# Patient Record
Sex: MALE | Race: WHITE | Employment: OTHER | URBAN - METROPOLITAN AREA
[De-identification: names, ages, dates, MRNs, and addresses within clinical notes are randomized per-mention and may not be internally consistent; named-entity substitution may affect disease eponyms.]

---

## 2021-01-01 ENCOUNTER — APPOINTMENT (OUTPATIENT)
Dept: GENERAL RADIOLOGY | Age: 76
DRG: 871 | End: 2021-01-01
Attending: EMERGENCY MEDICINE
Payer: MEDICARE

## 2021-01-01 ENCOUNTER — APPOINTMENT (OUTPATIENT)
Dept: CT IMAGING | Age: 76
DRG: 871 | End: 2021-01-01
Attending: INTERNAL MEDICINE
Payer: MEDICARE

## 2021-01-01 ENCOUNTER — APPOINTMENT (OUTPATIENT)
Dept: CT IMAGING | Age: 76
DRG: 871 | End: 2021-01-01
Attending: FAMILY MEDICINE
Payer: MEDICARE

## 2021-01-01 ENCOUNTER — APPOINTMENT (OUTPATIENT)
Dept: VASCULAR SURGERY | Age: 76
DRG: 871 | End: 2021-01-01
Attending: INTERNAL MEDICINE
Payer: MEDICARE

## 2021-01-01 ENCOUNTER — APPOINTMENT (OUTPATIENT)
Dept: GENERAL RADIOLOGY | Age: 76
DRG: 871 | End: 2021-01-01
Attending: PHYSICIAN ASSISTANT
Payer: MEDICARE

## 2021-01-01 ENCOUNTER — APPOINTMENT (OUTPATIENT)
Dept: VASCULAR SURGERY | Age: 76
DRG: 871 | End: 2021-01-01
Attending: FAMILY MEDICINE
Payer: MEDICARE

## 2021-01-01 ENCOUNTER — APPOINTMENT (OUTPATIENT)
Dept: GENERAL RADIOLOGY | Age: 76
DRG: 871 | End: 2021-01-01
Attending: ANESTHESIOLOGY
Payer: MEDICARE

## 2021-01-01 ENCOUNTER — DOCUMENTATION ONLY (OUTPATIENT)
Dept: ONCOLOGY | Age: 76
End: 2021-01-01

## 2021-01-01 ENCOUNTER — APPOINTMENT (OUTPATIENT)
Dept: GENERAL RADIOLOGY | Age: 76
DRG: 871 | End: 2021-01-01
Payer: MEDICARE

## 2021-01-01 ENCOUNTER — HOSPITAL ENCOUNTER (INPATIENT)
Age: 76
LOS: 16 days | DRG: 871 | End: 2021-06-07
Attending: EMERGENCY MEDICINE | Admitting: HOSPITALIST
Payer: MEDICARE

## 2021-01-01 VITALS
SYSTOLIC BLOOD PRESSURE: 49 MMHG | OXYGEN SATURATION: 43 % | DIASTOLIC BLOOD PRESSURE: 20 MMHG | TEMPERATURE: 91.4 F | BODY MASS INDEX: 30.12 KG/M2 | WEIGHT: 215.17 LBS | RESPIRATION RATE: 34 BRPM | HEIGHT: 71 IN

## 2021-01-01 DIAGNOSIS — U07.1 COVID-19: ICD-10-CM

## 2021-01-01 DIAGNOSIS — A41.9 SEPSIS WITHOUT ACUTE ORGAN DYSFUNCTION, DUE TO UNSPECIFIED ORGANISM (HCC): ICD-10-CM

## 2021-01-01 DIAGNOSIS — E87.6 HYPOKALEMIA: ICD-10-CM

## 2021-01-01 DIAGNOSIS — R09.02 HYPOXIA: Primary | ICD-10-CM

## 2021-01-01 LAB
25(OH)D3 SERPL-MCNC: 32.1 NG/ML (ref 30–100)
25(OH)D3 SERPL-MCNC: 34.9 NG/ML (ref 30–100)
ABO + RH BLD: NORMAL
ALBUMIN SERPL-MCNC: 1.4 G/DL (ref 3.5–5)
ALBUMIN SERPL-MCNC: 2.2 G/DL (ref 3.5–5)
ALBUMIN SERPL-MCNC: 2.3 G/DL (ref 3.5–5)
ALBUMIN SERPL-MCNC: 2.4 G/DL (ref 3.5–5)
ALBUMIN SERPL-MCNC: 2.4 G/DL (ref 3.5–5)
ALBUMIN SERPL-MCNC: 2.6 G/DL (ref 3.5–5)
ALBUMIN SERPL-MCNC: 2.6 G/DL (ref 3.5–5)
ALBUMIN/GLOB SERPL: 0.6 {RATIO} (ref 1.1–2.2)
ALBUMIN/GLOB SERPL: 0.8 {RATIO} (ref 1.1–2.2)
ALBUMIN/GLOB SERPL: 0.9 {RATIO} (ref 1.1–2.2)
ALP SERPL-CCNC: 61 U/L (ref 45–117)
ALP SERPL-CCNC: 65 U/L (ref 45–117)
ALP SERPL-CCNC: 71 U/L (ref 45–117)
ALP SERPL-CCNC: 72 U/L (ref 45–117)
ALP SERPL-CCNC: 73 U/L (ref 45–117)
ALP SERPL-CCNC: 73 U/L (ref 45–117)
ALT SERPL-CCNC: 1345 U/L (ref 12–78)
ALT SERPL-CCNC: 136 U/L (ref 12–78)
ALT SERPL-CCNC: 34 U/L (ref 12–78)
ALT SERPL-CCNC: 39 U/L (ref 12–78)
ALT SERPL-CCNC: 39 U/L (ref 12–78)
ALT SERPL-CCNC: 43 U/L (ref 12–78)
ALT SERPL-CCNC: 46 U/L (ref 12–78)
ALT SERPL-CCNC: 55 U/L (ref 12–78)
ANION GAP SERPL CALC-SCNC: 10 MMOL/L (ref 5–15)
ANION GAP SERPL CALC-SCNC: 3 MMOL/L (ref 5–15)
ANION GAP SERPL CALC-SCNC: 30 MMOL/L (ref 5–15)
ANION GAP SERPL CALC-SCNC: 5 MMOL/L (ref 5–15)
ANION GAP SERPL CALC-SCNC: 5 MMOL/L (ref 5–15)
ANION GAP SERPL CALC-SCNC: 6 MMOL/L (ref 5–15)
ANION GAP SERPL CALC-SCNC: 7 MMOL/L (ref 5–15)
ANION GAP SERPL CALC-SCNC: 7 MMOL/L (ref 5–15)
ANION GAP SERPL CALC-SCNC: 8 MMOL/L (ref 5–15)
ANION GAP SERPL CALC-SCNC: 9 MMOL/L (ref 5–15)
APTT PPP: 76.3 SEC (ref 22.1–31)
ARTERIAL PATENCY WRIST A: ABNORMAL
ARTERIAL PATENCY WRIST A: NEGATIVE
AST SERPL-CCNC: 30 U/L (ref 15–37)
AST SERPL-CCNC: 32 U/L (ref 15–37)
AST SERPL-CCNC: 38 U/L (ref 15–37)
AST SERPL-CCNC: 39 U/L (ref 15–37)
AST SERPL-CCNC: 39 U/L (ref 15–37)
AST SERPL-CCNC: 41 U/L (ref 15–37)
AST SERPL-CCNC: 41 U/L (ref 15–37)
AST SERPL-CCNC: 935 U/L (ref 15–37)
BACTERIA SPEC CULT: NORMAL
BASE DEFICIT BLD-SCNC: 12.3 MMOL/L
BASE DEFICIT BLD-SCNC: 14 MMOL/L
BASE DEFICIT BLD-SCNC: 26.1 MMOL/L
BASE EXCESS BLD CALC-SCNC: 6.5 MMOL/L
BASOPHILS # BLD: 0 K/UL (ref 0–0.1)
BASOPHILS NFR BLD: 0 % (ref 0–1)
BDY SITE: ABNORMAL
BILIRUB SERPL-MCNC: 0.5 MG/DL (ref 0.2–1)
BILIRUB SERPL-MCNC: 0.6 MG/DL (ref 0.2–1)
BILIRUB SERPL-MCNC: 0.7 MG/DL (ref 0.2–1)
BILIRUB SERPL-MCNC: 1.5 MG/DL (ref 0.2–1)
BLD PROD TYP BPU: NORMAL
BLD PROD TYP BPU: NORMAL
BLOOD GROUP ANTIBODIES SERPL: NORMAL
BNP SERPL-MCNC: 1136 PG/ML
BNP SERPL-MCNC: 263 PG/ML
BNP SERPL-MCNC: 323 PG/ML
BNP SERPL-MCNC: 348 PG/ML
BNP SERPL-MCNC: 366 PG/ML
BNP SERPL-MCNC: 439 PG/ML
BNP SERPL-MCNC: 446 PG/ML
BNP SERPL-MCNC: 538 PG/ML
BNP SERPL-MCNC: 569 PG/ML
BNP SERPL-MCNC: 574 PG/ML
BNP SERPL-MCNC: 585 PG/ML
BNP SERPL-MCNC: 619 PG/ML
BNP SERPL-MCNC: 688 PG/ML
BNP SERPL-MCNC: 821 PG/ML
BPU ID: NORMAL
BPU ID: NORMAL
BUN SERPL-MCNC: 25 MG/DL (ref 6–20)
BUN SERPL-MCNC: 27 MG/DL (ref 6–20)
BUN SERPL-MCNC: 29 MG/DL (ref 6–20)
BUN SERPL-MCNC: 29 MG/DL (ref 6–20)
BUN SERPL-MCNC: 31 MG/DL (ref 6–20)
BUN SERPL-MCNC: 32 MG/DL (ref 6–20)
BUN SERPL-MCNC: 35 MG/DL (ref 6–20)
BUN SERPL-MCNC: 36 MG/DL (ref 6–20)
BUN SERPL-MCNC: 54 MG/DL (ref 6–20)
BUN SERPL-MCNC: 70 MG/DL (ref 6–20)
BUN/CREAT SERPL: 24 (ref 12–20)
BUN/CREAT SERPL: 26 (ref 12–20)
BUN/CREAT SERPL: 32 (ref 12–20)
BUN/CREAT SERPL: 33 (ref 12–20)
BUN/CREAT SERPL: 34 (ref 12–20)
BUN/CREAT SERPL: 35 (ref 12–20)
BUN/CREAT SERPL: 36 (ref 12–20)
BUN/CREAT SERPL: 36 (ref 12–20)
BUN/CREAT SERPL: 38 (ref 12–20)
BUN/CREAT SERPL: 39 (ref 12–20)
BUN/CREAT SERPL: 41 (ref 12–20)
BUN/CREAT SERPL: 46 (ref 12–20)
CA-I BLD-SCNC: 0.86 MMOL/L (ref 1.12–1.32)
CA-I BLD-SCNC: 1.14 MMOL/L (ref 1.12–1.32)
CA-I BLD-SCNC: 1.21 MMOL/L (ref 1.12–1.32)
CALCIUM SERPL-MCNC: 6.8 MG/DL (ref 8.5–10.1)
CALCIUM SERPL-MCNC: 7.7 MG/DL (ref 8.5–10.1)
CALCIUM SERPL-MCNC: 7.9 MG/DL (ref 8.5–10.1)
CALCIUM SERPL-MCNC: 8.1 MG/DL (ref 8.5–10.1)
CALCIUM SERPL-MCNC: 8.1 MG/DL (ref 8.5–10.1)
CALCIUM SERPL-MCNC: 8.2 MG/DL (ref 8.5–10.1)
CALCIUM SERPL-MCNC: 8.5 MG/DL (ref 8.5–10.1)
CALCIUM SERPL-MCNC: 8.6 MG/DL (ref 8.5–10.1)
CHLORIDE SERPL-SCNC: 100 MMOL/L (ref 97–108)
CHLORIDE SERPL-SCNC: 102 MMOL/L (ref 97–108)
CHLORIDE SERPL-SCNC: 103 MMOL/L (ref 97–108)
CHLORIDE SERPL-SCNC: 104 MMOL/L (ref 97–108)
CHLORIDE SERPL-SCNC: 107 MMOL/L (ref 97–108)
CHLORIDE SERPL-SCNC: 107 MMOL/L (ref 97–108)
CHLORIDE SERPL-SCNC: 108 MMOL/L (ref 97–108)
CHLORIDE SERPL-SCNC: 108 MMOL/L (ref 97–108)
CHLORIDE SERPL-SCNC: 109 MMOL/L (ref 97–108)
CHLORIDE SERPL-SCNC: 98 MMOL/L (ref 97–108)
CO2 SERPL-SCNC: 22 MMOL/L (ref 21–32)
CO2 SERPL-SCNC: 24 MMOL/L (ref 21–32)
CO2 SERPL-SCNC: 25 MMOL/L (ref 21–32)
CO2 SERPL-SCNC: 25 MMOL/L (ref 21–32)
CO2 SERPL-SCNC: 27 MMOL/L (ref 21–32)
CO2 SERPL-SCNC: 29 MMOL/L (ref 21–32)
CO2 SERPL-SCNC: 29 MMOL/L (ref 21–32)
CO2 SERPL-SCNC: 30 MMOL/L (ref 21–32)
CO2 SERPL-SCNC: 35 MMOL/L (ref 21–32)
CO2 SERPL-SCNC: 8 MMOL/L (ref 21–32)
COMMENT, HOLDF: NORMAL
COVID-19 RAPID TEST, COVR: DETECTED
CREAT SERPL-MCNC: 0.76 MG/DL (ref 0.7–1.3)
CREAT SERPL-MCNC: 0.8 MG/DL (ref 0.7–1.3)
CREAT SERPL-MCNC: 0.87 MG/DL (ref 0.7–1.3)
CREAT SERPL-MCNC: 0.88 MG/DL (ref 0.7–1.3)
CREAT SERPL-MCNC: 0.89 MG/DL (ref 0.7–1.3)
CREAT SERPL-MCNC: 0.9 MG/DL (ref 0.7–1.3)
CREAT SERPL-MCNC: 0.9 MG/DL (ref 0.7–1.3)
CREAT SERPL-MCNC: 0.94 MG/DL (ref 0.7–1.3)
CREAT SERPL-MCNC: 0.97 MG/DL (ref 0.7–1.3)
CREAT SERPL-MCNC: 1.13 MG/DL (ref 0.7–1.3)
CREAT SERPL-MCNC: 1.18 MG/DL (ref 0.7–1.3)
CREAT SERPL-MCNC: 2.22 MG/DL (ref 0.7–1.3)
CRP SERPL-MCNC: 0.88 MG/DL (ref 0–0.6)
CRP SERPL-MCNC: 1.66 MG/DL (ref 0–0.6)
CRP SERPL-MCNC: 17.7 MG/DL (ref 0–0.6)
CRP SERPL-MCNC: 2.85 MG/DL (ref 0–0.6)
CRP SERPL-MCNC: 5.05 MG/DL (ref 0–0.6)
CRP SERPL-MCNC: <0.29 MG/DL (ref 0–0.6)
D DIMER PPP FEU-MCNC: 1.26 MG/L FEU (ref 0–0.65)
D DIMER PPP FEU-MCNC: 1.46 MG/L FEU (ref 0–0.65)
D DIMER PPP FEU-MCNC: 1.57 MG/L FEU (ref 0–0.65)
D DIMER PPP FEU-MCNC: 14.11 MG/L FEU (ref 0–0.65)
D DIMER PPP FEU-MCNC: 14.32 MG/L FEU (ref 0–0.65)
D DIMER PPP FEU-MCNC: 14.91 MG/L FEU (ref 0–0.65)
D DIMER PPP FEU-MCNC: 17.31 MG/L FEU (ref 0–0.65)
D DIMER PPP FEU-MCNC: 2.04 MG/L FEU (ref 0–0.65)
D DIMER PPP FEU-MCNC: 2.22 MG/L FEU (ref 0–0.65)
D DIMER PPP FEU-MCNC: 3.66 MG/L FEU (ref 0–0.65)
D DIMER PPP FEU-MCNC: 3.83 MG/L FEU (ref 0–0.65)
D DIMER PPP FEU-MCNC: 3.86 MG/L FEU (ref 0–0.65)
D DIMER PPP FEU-MCNC: 4.95 MG/L FEU (ref 0–0.65)
D DIMER PPP FEU-MCNC: 5.68 MG/L FEU (ref 0–0.65)
D DIMER PPP FEU-MCNC: 7 MG/L FEU (ref 0–0.65)
DIFFERENTIAL METHOD BLD: ABNORMAL
EOSINOPHIL # BLD: 0 K/UL (ref 0–0.4)
EOSINOPHIL # BLD: 0.7 K/UL (ref 0–0.4)
EOSINOPHIL NFR BLD: 0 % (ref 0–7)
EOSINOPHIL NFR BLD: 1 % (ref 0–7)
ERYTHROCYTE [DISTWIDTH] IN BLOOD BY AUTOMATED COUNT: 15.7 % (ref 11.5–14.5)
ERYTHROCYTE [DISTWIDTH] IN BLOOD BY AUTOMATED COUNT: 16.1 % (ref 11.5–14.5)
ERYTHROCYTE [DISTWIDTH] IN BLOOD BY AUTOMATED COUNT: 16.2 % (ref 11.5–14.5)
ERYTHROCYTE [DISTWIDTH] IN BLOOD BY AUTOMATED COUNT: 16.3 % (ref 11.5–14.5)
ERYTHROCYTE [DISTWIDTH] IN BLOOD BY AUTOMATED COUNT: 17.2 % (ref 11.5–14.5)
ERYTHROCYTE [DISTWIDTH] IN BLOOD BY AUTOMATED COUNT: 18.6 % (ref 11.5–14.5)
ERYTHROCYTE [DISTWIDTH] IN BLOOD BY AUTOMATED COUNT: 19.2 % (ref 11.5–14.5)
EST. AVERAGE GLUCOSE BLD GHB EST-MCNC: 131 MG/DL
FERRITIN SERPL-MCNC: 1769 NG/ML (ref 26–388)
FIBRINOGEN PPP-MCNC: 66 MG/DL (ref 200–475)
FIBRINOGEN PPP-MCNC: 712 MG/DL (ref 200–475)
GAS FLOW.O2 O2 DELIVERY SYS: ABNORMAL L/MIN
GAS FLOW.O2 SETTING OXYMISER: 22 BPM
GAS FLOW.O2 SETTING OXYMISER: 26 BPM
GLOBULIN SER CALC-MCNC: 1.7 G/DL (ref 2–4)
GLOBULIN SER CALC-MCNC: 2.8 G/DL (ref 2–4)
GLOBULIN SER CALC-MCNC: 3.6 G/DL (ref 2–4)
GLOBULIN SER CALC-MCNC: 3.7 G/DL (ref 2–4)
GLOBULIN SER CALC-MCNC: 3.9 G/DL (ref 2–4)
GLOBULIN SER CALC-MCNC: 4.7 G/DL (ref 2–4)
GLUCOSE BLD STRIP.AUTO-MCNC: 102 MG/DL (ref 65–117)
GLUCOSE BLD STRIP.AUTO-MCNC: 111 MG/DL (ref 65–117)
GLUCOSE BLD STRIP.AUTO-MCNC: 147 MG/DL (ref 65–117)
GLUCOSE BLD STRIP.AUTO-MCNC: 151 MG/DL (ref 65–117)
GLUCOSE BLD STRIP.AUTO-MCNC: 161 MG/DL (ref 65–117)
GLUCOSE BLD STRIP.AUTO-MCNC: 162 MG/DL (ref 65–117)
GLUCOSE BLD STRIP.AUTO-MCNC: 165 MG/DL (ref 65–117)
GLUCOSE BLD STRIP.AUTO-MCNC: 172 MG/DL (ref 65–117)
GLUCOSE BLD STRIP.AUTO-MCNC: 173 MG/DL (ref 65–117)
GLUCOSE BLD STRIP.AUTO-MCNC: 176 MG/DL (ref 65–117)
GLUCOSE BLD STRIP.AUTO-MCNC: 183 MG/DL (ref 65–117)
GLUCOSE BLD STRIP.AUTO-MCNC: 184 MG/DL (ref 65–117)
GLUCOSE BLD STRIP.AUTO-MCNC: 188 MG/DL (ref 65–117)
GLUCOSE BLD STRIP.AUTO-MCNC: 202 MG/DL (ref 65–117)
GLUCOSE BLD STRIP.AUTO-MCNC: 217 MG/DL (ref 65–117)
GLUCOSE BLD STRIP.AUTO-MCNC: 225 MG/DL (ref 65–117)
GLUCOSE BLD STRIP.AUTO-MCNC: 229 MG/DL (ref 65–117)
GLUCOSE BLD STRIP.AUTO-MCNC: 231 MG/DL (ref 65–117)
GLUCOSE BLD STRIP.AUTO-MCNC: 234 MG/DL (ref 65–117)
GLUCOSE BLD STRIP.AUTO-MCNC: 246 MG/DL (ref 65–117)
GLUCOSE BLD STRIP.AUTO-MCNC: 250 MG/DL (ref 65–117)
GLUCOSE BLD STRIP.AUTO-MCNC: 253 MG/DL (ref 65–117)
GLUCOSE BLD STRIP.AUTO-MCNC: 255 MG/DL (ref 65–117)
GLUCOSE BLD STRIP.AUTO-MCNC: 265 MG/DL (ref 65–117)
GLUCOSE BLD STRIP.AUTO-MCNC: 280 MG/DL (ref 65–117)
GLUCOSE BLD STRIP.AUTO-MCNC: 285 MG/DL (ref 65–117)
GLUCOSE BLD STRIP.AUTO-MCNC: 304 MG/DL (ref 65–117)
GLUCOSE BLD STRIP.AUTO-MCNC: 340 MG/DL (ref 65–117)
GLUCOSE SERPL-MCNC: 101 MG/DL (ref 65–100)
GLUCOSE SERPL-MCNC: 120 MG/DL (ref 65–100)
GLUCOSE SERPL-MCNC: 124 MG/DL (ref 65–100)
GLUCOSE SERPL-MCNC: 140 MG/DL (ref 65–100)
GLUCOSE SERPL-MCNC: 143 MG/DL (ref 65–100)
GLUCOSE SERPL-MCNC: 153 MG/DL (ref 65–100)
GLUCOSE SERPL-MCNC: 155 MG/DL (ref 65–100)
GLUCOSE SERPL-MCNC: 171 MG/DL (ref 65–100)
GLUCOSE SERPL-MCNC: 177 MG/DL (ref 65–100)
GLUCOSE SERPL-MCNC: 204 MG/DL (ref 65–100)
GLUCOSE SERPL-MCNC: 252 MG/DL (ref 65–100)
GLUCOSE SERPL-MCNC: 253 MG/DL (ref 65–100)
HBA1C MFR BLD: 6.2 % (ref 4–5.6)
HCO3 BLD-SCNC: 16 MMOL/L (ref 22–26)
HCO3 BLD-SCNC: 18.7 MMOL/L (ref 22–26)
HCO3 BLD-SCNC: 32.7 MMOL/L (ref 22–26)
HCO3 BLD-SCNC: 7.7 MMOL/L (ref 22–26)
HCT VFR BLD AUTO: 25.6 % (ref 36.6–50.3)
HCT VFR BLD AUTO: 33.4 % (ref 36.6–50.3)
HCT VFR BLD AUTO: 35 % (ref 36.6–50.3)
HCT VFR BLD AUTO: 35.2 % (ref 36.6–50.3)
HCT VFR BLD AUTO: 35.4 % (ref 36.6–50.3)
HCT VFR BLD AUTO: 35.7 % (ref 36.6–50.3)
HCT VFR BLD AUTO: 35.7 % (ref 36.6–50.3)
HCT VFR BLD AUTO: 35.8 % (ref 36.6–50.3)
HCT VFR BLD AUTO: 36.1 % (ref 36.6–50.3)
HCT VFR BLD AUTO: 37.2 % (ref 36.6–50.3)
HCT VFR BLD AUTO: 37.5 % (ref 36.6–50.3)
HCT VFR BLD AUTO: 38.7 % (ref 36.6–50.3)
HCT VFR BLD AUTO: 39.8 % (ref 36.6–50.3)
HCT VFR BLD AUTO: 40 % (ref 36.6–50.3)
HCT VFR BLD AUTO: 40.3 % (ref 36.6–50.3)
HCT VFR BLD AUTO: 40.7 % (ref 36.6–50.3)
HCT VFR BLD AUTO: 44.3 % (ref 36.6–50.3)
HGB BLD-MCNC: 10.9 G/DL (ref 12.1–17)
HGB BLD-MCNC: 11.5 G/DL (ref 12.1–17)
HGB BLD-MCNC: 11.7 G/DL (ref 12.1–17)
HGB BLD-MCNC: 11.7 G/DL (ref 12.1–17)
HGB BLD-MCNC: 11.8 G/DL (ref 12.1–17)
HGB BLD-MCNC: 11.9 G/DL (ref 12.1–17)
HGB BLD-MCNC: 12.1 G/DL (ref 12.1–17)
HGB BLD-MCNC: 12.2 G/DL (ref 12.1–17)
HGB BLD-MCNC: 12.7 G/DL (ref 12.1–17)
HGB BLD-MCNC: 12.9 G/DL (ref 12.1–17)
HGB BLD-MCNC: 13.1 G/DL (ref 12.1–17)
HGB BLD-MCNC: 13.1 G/DL (ref 12.1–17)
HGB BLD-MCNC: 13.5 G/DL (ref 12.1–17)
HGB BLD-MCNC: 14.1 G/DL (ref 12.1–17)
HGB BLD-MCNC: 7.4 G/DL (ref 12.1–17)
HISTORY CHECKED?,CKHIST: NORMAL
IGG SERPL-MCNC: 1230 MG/DL (ref 603–1613)
IGG1 SER-MCNC: 634 MG/DL (ref 248–810)
IGG2 SER-MCNC: 339 MG/DL (ref 130–555)
IGG3 SER-MCNC: 184 MG/DL (ref 15–102)
IGG4 SER-MCNC: 22 MG/DL (ref 2–96)
IL6 SERPL-MCNC: 1021 PG/ML (ref 0–13)
IMM GRANULOCYTES # BLD AUTO: 0 K/UL
IMM GRANULOCYTES # BLD AUTO: 0 K/UL
IMM GRANULOCYTES # BLD AUTO: 0.1 K/UL (ref 0–0.04)
IMM GRANULOCYTES # BLD AUTO: 0.2 K/UL (ref 0–0.04)
IMM GRANULOCYTES NFR BLD AUTO: 0 %
IMM GRANULOCYTES NFR BLD AUTO: 0 %
IMM GRANULOCYTES NFR BLD AUTO: 1 % (ref 0–0.5)
IMM GRANULOCYTES NFR BLD AUTO: 1 % (ref 0–0.5)
INR PPP: 1.1 (ref 0.9–1.1)
INR PPP: 1.8 (ref 0.9–1.1)
LACTATE SERPL-SCNC: 1.4 MMOL/L (ref 0.4–2)
LACTATE SERPL-SCNC: 1.7 MMOL/L (ref 0.4–2)
LACTATE SERPL-SCNC: 1.8 MMOL/L (ref 0.4–2)
LACTATE SERPL-SCNC: 2.3 MMOL/L (ref 0.4–2)
LACTATE SERPL-SCNC: 2.4 MMOL/L (ref 0.4–2)
LACTATE SERPL-SCNC: 2.4 MMOL/L (ref 0.4–2)
LACTATE SERPL-SCNC: 2.6 MMOL/L (ref 0.4–2)
LACTATE SERPL-SCNC: 2.8 MMOL/L (ref 0.4–2)
LACTATE SERPL-SCNC: 2.9 MMOL/L (ref 0.4–2)
LACTATE SERPL-SCNC: 22.9 MMOL/L (ref 0.4–2)
LDH SERPL L TO P-CCNC: 513 U/L (ref 85–241)
LYMPHOCYTES # BLD: 0.5 K/UL (ref 0.8–3.5)
LYMPHOCYTES # BLD: 0.5 K/UL (ref 0.8–3.5)
LYMPHOCYTES # BLD: 0.7 K/UL (ref 0.8–3.5)
LYMPHOCYTES # BLD: 0.8 K/UL (ref 0.8–3.5)
LYMPHOCYTES NFR BLD: 1 % (ref 12–49)
LYMPHOCYTES NFR BLD: 2 % (ref 12–49)
LYMPHOCYTES NFR BLD: 3 % (ref 12–49)
LYMPHOCYTES NFR BLD: 5 % (ref 12–49)
MAGNESIUM SERPL-MCNC: 2.1 MG/DL (ref 1.6–2.4)
MAGNESIUM SERPL-MCNC: 2.2 MG/DL (ref 1.6–2.4)
MAGNESIUM SERPL-MCNC: 2.2 MG/DL (ref 1.6–2.4)
MAGNESIUM SERPL-MCNC: 2.3 MG/DL (ref 1.6–2.4)
MAGNESIUM SERPL-MCNC: 2.3 MG/DL (ref 1.6–2.4)
MAGNESIUM SERPL-MCNC: 3 MG/DL (ref 1.6–2.4)
MCH RBC QN AUTO: 30.5 PG (ref 26–34)
MCH RBC QN AUTO: 30.5 PG (ref 26–34)
MCH RBC QN AUTO: 30.6 PG (ref 26–34)
MCH RBC QN AUTO: 30.7 PG (ref 26–34)
MCH RBC QN AUTO: 30.8 PG (ref 26–34)
MCH RBC QN AUTO: 31.3 PG (ref 26–34)
MCH RBC QN AUTO: 31.6 PG (ref 26–34)
MCHC RBC AUTO-ENTMCNC: 28.9 G/DL (ref 30–36.5)
MCHC RBC AUTO-ENTMCNC: 31.8 G/DL (ref 30–36.5)
MCHC RBC AUTO-ENTMCNC: 32.2 G/DL (ref 30–36.5)
MCHC RBC AUTO-ENTMCNC: 32.4 G/DL (ref 30–36.5)
MCHC RBC AUTO-ENTMCNC: 32.8 G/DL (ref 30–36.5)
MCHC RBC AUTO-ENTMCNC: 33.3 G/DL (ref 30–36.5)
MCHC RBC AUTO-ENTMCNC: 33.5 G/DL (ref 30–36.5)
MCV RBC AUTO: 109.4 FL (ref 80–99)
MCV RBC AUTO: 91 FL (ref 80–99)
MCV RBC AUTO: 92.2 FL (ref 80–99)
MCV RBC AUTO: 93.9 FL (ref 80–99)
MCV RBC AUTO: 95.1 FL (ref 80–99)
MCV RBC AUTO: 95.7 FL (ref 80–99)
MCV RBC AUTO: 96.6 FL (ref 80–99)
METAMYELOCYTES NFR BLD MANUAL: 6 %
MONOCYTES # BLD: 1.6 K/UL (ref 0–1)
MONOCYTES # BLD: 1.6 K/UL (ref 0–1)
MONOCYTES # BLD: 1.7 K/UL (ref 0–1)
MONOCYTES # BLD: 3.5 K/UL (ref 0–1)
MONOCYTES NFR BLD: 10 % (ref 5–13)
MONOCYTES NFR BLD: 11 % (ref 5–13)
MONOCYTES NFR BLD: 5 % (ref 5–13)
MONOCYTES NFR BLD: 7 % (ref 5–13)
MYELOCYTES NFR BLD MANUAL: 2 %
NEUTS BAND NFR BLD MANUAL: 10 % (ref 0–6)
NEUTS SEG # BLD: 12.5 K/UL (ref 1.8–8)
NEUTS SEG # BLD: 13.2 K/UL (ref 1.8–8)
NEUTS SEG # BLD: 21.5 K/UL (ref 1.8–8)
NEUTS SEG # BLD: 58.9 K/UL (ref 1.8–8)
NEUTS SEG NFR BLD: 75 % (ref 32–75)
NEUTS SEG NFR BLD: 83 % (ref 32–75)
NEUTS SEG NFR BLD: 86 % (ref 32–75)
NEUTS SEG NFR BLD: 91 % (ref 32–75)
NRBC # BLD: 0 K/UL (ref 0–0.01)
NRBC # BLD: 0.02 K/UL (ref 0–0.01)
NRBC # BLD: 0.09 K/UL (ref 0–0.01)
NRBC # BLD: 0.2 K/UL (ref 0–0.01)
NRBC # BLD: 1.15 K/UL (ref 0–0.01)
NRBC BLD-RTO: 0 PER 100 WBC
NRBC BLD-RTO: 0.1 PER 100 WBC
NRBC BLD-RTO: 0.3 PER 100 WBC
NRBC BLD-RTO: 0.5 PER 100 WBC
NRBC BLD-RTO: 1.7 PER 100 WBC
O2/TOTAL GAS SETTING VFR VENT: 100 %
O2/TOTAL GAS SETTING VFR VENT: 100 %
O2/TOTAL GAS SETTING VFR VENT: 70 %
PAW @ MEAN EXP FLOW ON VENT: 19 CMH2O
PAW @ MEAN EXP FLOW ON VENT: 22 CMH2O
PCO2 BLD: 52.3 MMHG (ref 35–45)
PCO2 BLD: 55.9 MMHG (ref 35–45)
PCO2 BLD: 63.8 MMHG (ref 35–45)
PCO2 BLD: 82.3 MMHG (ref 35–45)
PEEP RESPIRATORY: 12 CMH2O
PEEP RESPIRATORY: 12 CMH2O
PH BLD: 6.75 [PH] (ref 7.35–7.45)
PH BLD: 6.96 [PH] (ref 7.35–7.45)
PH BLD: 7.01 [PH] (ref 7.35–7.45)
PH BLD: 7.4 [PH] (ref 7.35–7.45)
PHOSPHATE SERPL-MCNC: 12.7 MG/DL (ref 2.6–4.7)
PLATELET # BLD AUTO: 153 K/UL (ref 150–400)
PLATELET # BLD AUTO: 195 K/UL (ref 150–400)
PLATELET # BLD AUTO: 334 K/UL (ref 150–400)
PLATELET # BLD AUTO: 362 K/UL (ref 150–400)
PLATELET # BLD AUTO: 372 K/UL (ref 150–400)
PLATELET # BLD AUTO: 380 K/UL (ref 150–400)
PLATELET # BLD AUTO: 385 K/UL (ref 150–400)
PMV BLD AUTO: 11.1 FL (ref 8.9–12.9)
PMV BLD AUTO: 11.5 FL (ref 8.9–12.9)
PMV BLD AUTO: 11.7 FL (ref 8.9–12.9)
PMV BLD AUTO: 11.8 FL (ref 8.9–12.9)
PMV BLD AUTO: 12 FL (ref 8.9–12.9)
PMV BLD AUTO: 12.5 FL (ref 8.9–12.9)
PMV BLD AUTO: 12.8 FL (ref 8.9–12.9)
PO2 BLD: 242 MMHG (ref 80–100)
PO2 BLD: 36 MMHG (ref 80–100)
PO2 BLD: 52 MMHG (ref 80–100)
PO2 BLD: 75 MMHG (ref 80–100)
POTASSIUM SERPL-SCNC: 3 MMOL/L (ref 3.5–5.1)
POTASSIUM SERPL-SCNC: 3.5 MMOL/L (ref 3.5–5.1)
POTASSIUM SERPL-SCNC: 3.7 MMOL/L (ref 3.5–5.1)
POTASSIUM SERPL-SCNC: 3.8 MMOL/L (ref 3.5–5.1)
POTASSIUM SERPL-SCNC: 3.8 MMOL/L (ref 3.5–5.1)
POTASSIUM SERPL-SCNC: 3.9 MMOL/L (ref 3.5–5.1)
POTASSIUM SERPL-SCNC: 4 MMOL/L (ref 3.5–5.1)
POTASSIUM SERPL-SCNC: 4.2 MMOL/L (ref 3.5–5.1)
POTASSIUM SERPL-SCNC: 4.3 MMOL/L (ref 3.5–5.1)
POTASSIUM SERPL-SCNC: 4.5 MMOL/L (ref 3.5–5.1)
POTASSIUM SERPL-SCNC: 4.6 MMOL/L (ref 3.5–5.1)
POTASSIUM SERPL-SCNC: 4.8 MMOL/L (ref 3.5–5.1)
PROCALCITONIN SERPL-MCNC: 0.1 NG/ML
PROCALCITONIN SERPL-MCNC: 0.11 NG/ML
PROCALCITONIN SERPL-MCNC: 0.14 NG/ML
PROT SERPL-MCNC: 3.1 G/DL (ref 6.4–8.2)
PROT SERPL-MCNC: 5.4 G/DL (ref 6.4–8.2)
PROT SERPL-MCNC: 5.8 G/DL (ref 6.4–8.2)
PROT SERPL-MCNC: 5.9 G/DL (ref 6.4–8.2)
PROT SERPL-MCNC: 5.9 G/DL (ref 6.4–8.2)
PROT SERPL-MCNC: 6 G/DL (ref 6.4–8.2)
PROT SERPL-MCNC: 6.3 G/DL (ref 6.4–8.2)
PROT SERPL-MCNC: 7.3 G/DL (ref 6.4–8.2)
PROTHROMBIN TIME: 11.6 SEC (ref 9–11.1)
PROTHROMBIN TIME: 17.9 SEC (ref 9–11.1)
RBC # BLD AUTO: 2.34 M/UL (ref 4.1–5.7)
RBC # BLD AUTO: 3.87 M/UL (ref 4.1–5.7)
RBC # BLD AUTO: 4.12 M/UL (ref 4.1–5.7)
RBC # BLD AUTO: 4.12 M/UL (ref 4.1–5.7)
RBC # BLD AUTO: 4.28 M/UL (ref 4.1–5.7)
RBC # BLD AUTO: 4.43 M/UL (ref 4.1–5.7)
RBC # BLD AUTO: 4.63 M/UL (ref 4.1–5.7)
RBC MORPH BLD: ABNORMAL
SAMPLES BEING HELD,HOLD: NORMAL
SAO2 % BLD: 62.9 % (ref 92–97)
SAO2 % BLD: 67.9 % (ref 92–97)
SAO2 % BLD: 85 % (ref 92–97)
SAO2 % BLD: 98.8 % (ref 92–97)
SARS-COV-2, COV2NT: DETECTED
SERVICE CMNT-IMP: ABNORMAL
SERVICE CMNT-IMP: NORMAL
SODIUM SERPL-SCNC: 136 MMOL/L (ref 136–145)
SODIUM SERPL-SCNC: 136 MMOL/L (ref 136–145)
SODIUM SERPL-SCNC: 137 MMOL/L (ref 136–145)
SODIUM SERPL-SCNC: 138 MMOL/L (ref 136–145)
SODIUM SERPL-SCNC: 139 MMOL/L (ref 136–145)
SODIUM SERPL-SCNC: 140 MMOL/L (ref 136–145)
SODIUM SERPL-SCNC: 141 MMOL/L (ref 136–145)
SODIUM SERPL-SCNC: 141 MMOL/L (ref 136–145)
SODIUM SERPL-SCNC: 143 MMOL/L (ref 136–145)
SODIUM SERPL-SCNC: 145 MMOL/L (ref 136–145)
SOURCE, COVRS: ABNORMAL
SPECIMEN EXP DATE BLD: NORMAL
SPECIMEN TYPE: ABNORMAL
STATUS OF UNIT,%ST: NORMAL
STATUS OF UNIT,%ST: NORMAL
THERAPEUTIC RANGE,PTTT: ABNORMAL SECS (ref 58–77)
UNIT DIVISION, %UDIV: 0
UNIT DIVISION, %UDIV: 0
VENTILATION MODE VENT: ABNORMAL
VENTILATION MODE VENT: ABNORMAL
VT SETTING VENT: 550 ML
VT SETTING VENT: 550 ML
WBC # BLD AUTO: 15.1 K/UL (ref 4.1–11.1)
WBC # BLD AUTO: 15.5 K/UL (ref 4.1–11.1)
WBC # BLD AUTO: 23.7 K/UL (ref 4.1–11.1)
WBC # BLD AUTO: 28.5 K/UL (ref 4.1–11.1)
WBC # BLD AUTO: 30.8 K/UL (ref 4.1–11.1)
WBC # BLD AUTO: 39.2 K/UL (ref 4.1–11.1)
WBC # BLD AUTO: 69.3 K/UL (ref 4.1–11.1)

## 2021-01-01 PROCEDURE — 85610 PROTHROMBIN TIME: CPT

## 2021-01-01 PROCEDURE — 74011250637 HC RX REV CODE- 250/637: Performed by: HOSPITALIST

## 2021-01-01 PROCEDURE — 36415 COLL VENOUS BLD VENIPUNCTURE: CPT

## 2021-01-01 PROCEDURE — 86140 C-REACTIVE PROTEIN: CPT

## 2021-01-01 PROCEDURE — 80053 COMPREHEN METABOLIC PANEL: CPT

## 2021-01-01 PROCEDURE — 74011000258 HC RX REV CODE- 258: Performed by: EMERGENCY MEDICINE

## 2021-01-01 PROCEDURE — P9045 ALBUMIN (HUMAN), 5%, 250 ML: HCPCS | Performed by: NURSE PRACTITIONER

## 2021-01-01 PROCEDURE — 82962 GLUCOSE BLOOD TEST: CPT

## 2021-01-01 PROCEDURE — 84145 PROCALCITONIN (PCT): CPT

## 2021-01-01 PROCEDURE — 74011250637 HC RX REV CODE- 250/637: Performed by: INTERNAL MEDICINE

## 2021-01-01 PROCEDURE — 94664 DEMO&/EVAL PT USE INHALER: CPT

## 2021-01-01 PROCEDURE — 74011250636 HC RX REV CODE- 250/636: Performed by: PHYSICIAN ASSISTANT

## 2021-01-01 PROCEDURE — 74011250636 HC RX REV CODE- 250/636: Performed by: NURSE PRACTITIONER

## 2021-01-01 PROCEDURE — 85379 FIBRIN DEGRADATION QUANT: CPT

## 2021-01-01 PROCEDURE — 74011636637 HC RX REV CODE- 636/637: Performed by: INTERNAL MEDICINE

## 2021-01-01 PROCEDURE — 83880 ASSAY OF NATRIURETIC PEPTIDE: CPT

## 2021-01-01 PROCEDURE — 85730 THROMBOPLASTIN TIME PARTIAL: CPT

## 2021-01-01 PROCEDURE — 36600 WITHDRAWAL OF ARTERIAL BLOOD: CPT

## 2021-01-01 PROCEDURE — 83615 LACTATE (LD) (LDH) ENZYME: CPT

## 2021-01-01 PROCEDURE — 31500 INSERT EMERGENCY AIRWAY: CPT

## 2021-01-01 PROCEDURE — 94640 AIRWAY INHALATION TREATMENT: CPT

## 2021-01-01 PROCEDURE — 84100 ASSAY OF PHOSPHORUS: CPT

## 2021-01-01 PROCEDURE — 99285 EMERGENCY DEPT VISIT HI MDM: CPT

## 2021-01-01 PROCEDURE — 74011000250 HC RX REV CODE- 250: Performed by: HOSPITALIST

## 2021-01-01 PROCEDURE — 65660000001 HC RM ICU INTERMED STEPDOWN

## 2021-01-01 PROCEDURE — 85025 COMPLETE CBC W/AUTO DIFF WBC: CPT

## 2021-01-01 PROCEDURE — 71275 CT ANGIOGRAPHY CHEST: CPT

## 2021-01-01 PROCEDURE — 94760 N-INVAS EAR/PLS OXIMETRY 1: CPT

## 2021-01-01 PROCEDURE — 74011000250 HC RX REV CODE- 250

## 2021-01-01 PROCEDURE — 36430 TRANSFUSION BLD/BLD COMPNT: CPT

## 2021-01-01 PROCEDURE — 82787 IGG 1 2 3 OR 4 EACH: CPT

## 2021-01-01 PROCEDURE — 83605 ASSAY OF LACTIC ACID: CPT

## 2021-01-01 PROCEDURE — U0003 INFECTIOUS AGENT DETECTION BY NUCLEIC ACID (DNA OR RNA); SEVERE ACUTE RESPIRATORY SYNDROME CORONAVIRUS 2 (SARS-COV-2) (CORONAVIRUS DISEASE [COVID-19]), AMPLIFIED PROBE TECHNIQUE, MAKING USE OF HIGH THROUGHPUT TECHNOLOGIES AS DESCRIBED BY CMS-2020-01-R: HCPCS

## 2021-01-01 PROCEDURE — 74011250636 HC RX REV CODE- 250/636: Performed by: ANESTHESIOLOGY

## 2021-01-01 PROCEDURE — 71045 X-RAY EXAM CHEST 1 VIEW: CPT

## 2021-01-01 PROCEDURE — C9113 INJ PANTOPRAZOLE SODIUM, VIA: HCPCS | Performed by: HOSPITALIST

## 2021-01-01 PROCEDURE — 74011250636 HC RX REV CODE- 250/636: Performed by: INTERNAL MEDICINE

## 2021-01-01 PROCEDURE — 82306 VITAMIN D 25 HYDROXY: CPT

## 2021-01-01 PROCEDURE — 86923 COMPATIBILITY TEST ELECTRIC: CPT

## 2021-01-01 PROCEDURE — 74011000250 HC RX REV CODE- 250: Performed by: NURSE PRACTITIONER

## 2021-01-01 PROCEDURE — 93970 EXTREMITY STUDY: CPT

## 2021-01-01 PROCEDURE — 82728 ASSAY OF FERRITIN: CPT

## 2021-01-01 PROCEDURE — 83735 ASSAY OF MAGNESIUM: CPT

## 2021-01-01 PROCEDURE — 74011000258 HC RX REV CODE- 258: Performed by: FAMILY MEDICINE

## 2021-01-01 PROCEDURE — 85027 COMPLETE CBC AUTOMATED: CPT

## 2021-01-01 PROCEDURE — 85018 HEMOGLOBIN: CPT

## 2021-01-01 PROCEDURE — 94660 CPAP INITIATION&MGMT: CPT

## 2021-01-01 PROCEDURE — 87077 CULTURE AEROBIC IDENTIFY: CPT

## 2021-01-01 PROCEDURE — 94762 N-INVAS EAR/PLS OXIMTRY CONT: CPT

## 2021-01-01 PROCEDURE — 85014 HEMATOCRIT: CPT

## 2021-01-01 PROCEDURE — 92950 HEART/LUNG RESUSCITATION CPR: CPT

## 2021-01-01 PROCEDURE — 87186 SC STD MICRODIL/AGAR DIL: CPT

## 2021-01-01 PROCEDURE — 80048 BASIC METABOLIC PNL TOTAL CA: CPT

## 2021-01-01 PROCEDURE — 77030041213 HC CHMBR AIRSPRL TUBE FISP -B

## 2021-01-01 PROCEDURE — 65660000000 HC RM CCU STEPDOWN

## 2021-01-01 PROCEDURE — 77010033711 HC HIGH FLOW OXYGEN

## 2021-01-01 PROCEDURE — 74011250636 HC RX REV CODE- 250/636: Performed by: HOSPITALIST

## 2021-01-01 PROCEDURE — C1769 GUIDE WIRE: HCPCS

## 2021-01-01 PROCEDURE — 87040 BLOOD CULTURE FOR BACTERIA: CPT

## 2021-01-01 PROCEDURE — 74011250636 HC RX REV CODE- 250/636: Performed by: FAMILY MEDICINE

## 2021-01-01 PROCEDURE — 36573 INSJ PICC RS&I 5 YR+: CPT | Performed by: INTERNAL MEDICINE

## 2021-01-01 PROCEDURE — 74011000258 HC RX REV CODE- 258: Performed by: ANESTHESIOLOGY

## 2021-01-01 PROCEDURE — 87635 SARS-COV-2 COVID-19 AMP PRB: CPT

## 2021-01-01 PROCEDURE — 93971 EXTREMITY STUDY: CPT

## 2021-01-01 PROCEDURE — 74011000258 HC RX REV CODE- 258: Performed by: NURSE PRACTITIONER

## 2021-01-01 PROCEDURE — P9073 PLATELETS PHERESIS PATH REDU: HCPCS

## 2021-01-01 PROCEDURE — 74011000250 HC RX REV CODE- 250: Performed by: ANESTHESIOLOGY

## 2021-01-01 PROCEDURE — 83520 IMMUNOASSAY QUANT NOS NONAB: CPT

## 2021-01-01 PROCEDURE — 77030020365 HC SOL INJ SOD CL 0.9% 50ML

## 2021-01-01 PROCEDURE — P9040 RBC LEUKOREDUCED IRRADIATED: HCPCS

## 2021-01-01 PROCEDURE — P9016 RBC LEUKOCYTES REDUCED: HCPCS

## 2021-01-01 PROCEDURE — 5A09557 ASSISTANCE WITH RESPIRATORY VENTILATION, GREATER THAN 96 CONSECUTIVE HOURS, CONTINUOUS POSITIVE AIRWAY PRESSURE: ICD-10-PCS | Performed by: HOSPITALIST

## 2021-01-01 PROCEDURE — XW033E5 INTRODUCTION OF REMDESIVIR ANTI-INFECTIVE INTO PERIPHERAL VEIN, PERCUTANEOUS APPROACH, NEW TECHNOLOGY GROUP 5: ICD-10-PCS | Performed by: HOSPITALIST

## 2021-01-01 PROCEDURE — 82803 BLOOD GASES ANY COMBINATION: CPT

## 2021-01-01 PROCEDURE — 74011000258 HC RX REV CODE- 258: Performed by: HOSPITALIST

## 2021-01-01 PROCEDURE — 74011250637 HC RX REV CODE- 250/637: Performed by: PHYSICIAN ASSISTANT

## 2021-01-01 PROCEDURE — 0BJ08ZZ INSPECTION OF TRACHEOBRONCHIAL TREE, VIA NATURAL OR ARTIFICIAL OPENING ENDOSCOPIC: ICD-10-PCS | Performed by: ANESTHESIOLOGY

## 2021-01-01 PROCEDURE — 86920 COMPATIBILITY TEST SPIN: CPT

## 2021-01-01 PROCEDURE — 86901 BLOOD TYPING SEROLOGIC RH(D): CPT

## 2021-01-01 PROCEDURE — XW033H5 INTRODUCTION OF TOCILIZUMAB INTO PERIPHERAL VEIN, PERCUTANEOUS APPROACH, NEW TECHNOLOGY GROUP 5: ICD-10-PCS | Performed by: INTERNAL MEDICINE

## 2021-01-01 PROCEDURE — 02HV33Z INSERTION OF INFUSION DEVICE INTO SUPERIOR VENA CAVA, PERCUTANEOUS APPROACH: ICD-10-PCS | Performed by: INTERNAL MEDICINE

## 2021-01-01 PROCEDURE — 77030012794 HC KT NSL CANN/HGH TRAN -A

## 2021-01-01 PROCEDURE — 02HV33Z INSERTION OF INFUSION DEVICE INTO SUPERIOR VENA CAVA, PERCUTANEOUS APPROACH: ICD-10-PCS | Performed by: HOSPITALIST

## 2021-01-01 PROCEDURE — 74011000258 HC RX REV CODE- 258: Performed by: INTERNAL MEDICINE

## 2021-01-01 PROCEDURE — 83036 HEMOGLOBIN GLYCOSYLATED A1C: CPT

## 2021-01-01 PROCEDURE — 77030012341 HC CHMB SPCR OPTC MDI VYRM -A

## 2021-01-01 PROCEDURE — 77010033678 HC OXYGEN DAILY

## 2021-01-01 PROCEDURE — 03HY32Z INSERTION OF MONITORING DEVICE INTO UPPER ARTERY, PERCUTANEOUS APPROACH: ICD-10-PCS | Performed by: ANESTHESIOLOGY

## 2021-01-01 PROCEDURE — 74011250636 HC RX REV CODE- 250/636: Performed by: EMERGENCY MEDICINE

## 2021-01-01 PROCEDURE — P9059 PLASMA, FRZ BETWEEN 8-24HOUR: HCPCS

## 2021-01-01 PROCEDURE — C1751 CATH, INF, PER/CENT/MIDLINE: HCPCS

## 2021-01-01 PROCEDURE — 94002 VENT MGMT INPAT INIT DAY: CPT

## 2021-01-01 PROCEDURE — 0BH17EZ INSERTION OF ENDOTRACHEAL AIRWAY INTO TRACHEA, VIA NATURAL OR ARTIFICIAL OPENING: ICD-10-PCS | Performed by: ANESTHESIOLOGY

## 2021-01-01 PROCEDURE — 85384 FIBRINOGEN ACTIVITY: CPT

## 2021-01-01 PROCEDURE — 74011636637 HC RX REV CODE- 636/637: Performed by: FAMILY MEDICINE

## 2021-01-01 PROCEDURE — 74011000636 HC RX REV CODE- 636: Performed by: RADIOLOGY

## 2021-01-01 PROCEDURE — 82040 ASSAY OF SERUM ALBUMIN: CPT

## 2021-01-01 PROCEDURE — 5A1935Z RESPIRATORY VENTILATION, LESS THAN 24 CONSECUTIVE HOURS: ICD-10-PCS | Performed by: ANESTHESIOLOGY

## 2021-01-01 PROCEDURE — 30233N1 TRANSFUSION OF NONAUTOLOGOUS RED BLOOD CELLS INTO PERIPHERAL VEIN, PERCUTANEOUS APPROACH: ICD-10-PCS | Performed by: FAMILY MEDICINE

## 2021-01-01 PROCEDURE — 76937 US GUIDE VASCULAR ACCESS: CPT

## 2021-01-01 RX ORDER — ENOXAPARIN SODIUM 100 MG/ML
40 INJECTION SUBCUTANEOUS EVERY 12 HOURS
Status: COMPLETED | OUTPATIENT
Start: 2021-01-01 | End: 2021-01-01

## 2021-01-01 RX ORDER — POLYETHYLENE GLYCOL 3350 17 G/17G
17 POWDER, FOR SOLUTION ORAL DAILY PRN
Status: DISCONTINUED | OUTPATIENT
Start: 2021-01-01 | End: 2021-01-01 | Stop reason: SDUPTHER

## 2021-01-01 RX ORDER — FUROSEMIDE 20 MG/1
20 TABLET ORAL DAILY
Status: DISCONTINUED | OUTPATIENT
Start: 2021-01-01 | End: 2021-01-01

## 2021-01-01 RX ORDER — INSULIN GLARGINE 100 [IU]/ML
5 INJECTION, SOLUTION SUBCUTANEOUS DAILY
Status: DISCONTINUED | OUTPATIENT
Start: 2021-01-01 | End: 2021-01-01

## 2021-01-01 RX ORDER — INSULIN LISPRO 100 [IU]/ML
5 INJECTION, SOLUTION INTRAVENOUS; SUBCUTANEOUS
Status: DISCONTINUED | OUTPATIENT
Start: 2021-01-01 | End: 2021-01-01

## 2021-01-01 RX ORDER — LISINOPRIL AND HYDROCHLOROTHIAZIDE 20; 25 MG/1; MG/1
1 TABLET ORAL DAILY
COMMUNITY

## 2021-01-01 RX ORDER — SODIUM BICARBONATE 84 MG/ML
INJECTION, SOLUTION INTRAVENOUS
Status: DISCONTINUED
Start: 2021-01-01 | End: 2021-01-01 | Stop reason: HOSPADM

## 2021-01-01 RX ORDER — ACYCLOVIR 800 MG/1
400 TABLET ORAL 2 TIMES DAILY
Status: DISCONTINUED | OUTPATIENT
Start: 2021-01-01 | End: 2021-01-01 | Stop reason: HOSPADM

## 2021-01-01 RX ORDER — ENOXAPARIN SODIUM 100 MG/ML
30 INJECTION SUBCUTANEOUS EVERY 12 HOURS
Status: DISCONTINUED | OUTPATIENT
Start: 2021-01-01 | End: 2021-01-01 | Stop reason: DRUGHIGH

## 2021-01-01 RX ORDER — NOREPINEPHRINE BITARTRATE/D5W 8 MG/250ML
.5-3 PLASTIC BAG, INJECTION (ML) INTRAVENOUS
Status: DISCONTINUED | OUTPATIENT
Start: 2021-01-01 | End: 2021-01-01 | Stop reason: HOSPADM

## 2021-01-01 RX ORDER — AMLODIPINE BESYLATE 5 MG/1
5 TABLET ORAL DAILY
Status: DISCONTINUED | OUTPATIENT
Start: 2021-01-01 | End: 2021-01-01 | Stop reason: HOSPADM

## 2021-01-01 RX ORDER — FLUOXETINE HYDROCHLORIDE 40 MG/1
40 CAPSULE ORAL DAILY
COMMUNITY

## 2021-01-01 RX ORDER — PANTOPRAZOLE SODIUM 40 MG/1
40 TABLET, DELAYED RELEASE ORAL
Status: DISCONTINUED | OUTPATIENT
Start: 2021-01-01 | End: 2021-01-01 | Stop reason: SDUPTHER

## 2021-01-01 RX ORDER — LEVOFLOXACIN 5 MG/ML
750 INJECTION, SOLUTION INTRAVENOUS EVERY 24 HOURS
Status: DISCONTINUED | OUTPATIENT
Start: 2021-01-01 | End: 2021-01-01

## 2021-01-01 RX ORDER — ACETAMINOPHEN 650 MG/1
650 SUPPOSITORY RECTAL
Status: DISCONTINUED | OUTPATIENT
Start: 2021-01-01 | End: 2021-01-01 | Stop reason: HOSPADM

## 2021-01-01 RX ORDER — PROMETHAZINE HYDROCHLORIDE 25 MG/1
12.5 TABLET ORAL
Status: DISCONTINUED | OUTPATIENT
Start: 2021-01-01 | End: 2021-01-01

## 2021-01-01 RX ORDER — ACYCLOVIR 400 MG/1
400 TABLET ORAL 2 TIMES DAILY
COMMUNITY

## 2021-01-01 RX ORDER — AMLODIPINE BESYLATE 10 MG/1
10 TABLET ORAL DAILY
COMMUNITY

## 2021-01-01 RX ORDER — SODIUM BICARBONATE IN D5W 150/1000ML
PLASTIC BAG, INJECTION (ML) INTRAVENOUS CONTINUOUS
Status: DISCONTINUED | OUTPATIENT
Start: 2021-01-01 | End: 2021-01-01 | Stop reason: HOSPADM

## 2021-01-01 RX ORDER — FLUOXETINE HYDROCHLORIDE 20 MG/1
40 CAPSULE ORAL DAILY
Status: DISCONTINUED | OUTPATIENT
Start: 2021-01-01 | End: 2021-01-01 | Stop reason: HOSPADM

## 2021-01-01 RX ORDER — FOLIC ACID 1 MG/1
1 TABLET ORAL DAILY
Status: DISCONTINUED | OUTPATIENT
Start: 2021-01-01 | End: 2021-01-01 | Stop reason: HOSPADM

## 2021-01-01 RX ORDER — LEVOFLOXACIN 5 MG/ML
750 INJECTION, SOLUTION INTRAVENOUS EVERY 24 HOURS
Status: DISCONTINUED | OUTPATIENT
Start: 2021-01-01 | End: 2021-01-01 | Stop reason: HOSPADM

## 2021-01-01 RX ORDER — SODIUM CHLORIDE, SODIUM LACTATE, POTASSIUM CHLORIDE, CALCIUM CHLORIDE 600; 310; 30; 20 MG/100ML; MG/100ML; MG/100ML; MG/100ML
1000 INJECTION, SOLUTION INTRAVENOUS ONCE
Status: COMPLETED | OUTPATIENT
Start: 2021-01-01 | End: 2021-01-01

## 2021-01-01 RX ORDER — POTASSIUM CHLORIDE 750 MG/1
20 TABLET, FILM COATED, EXTENDED RELEASE ORAL
Status: COMPLETED | OUTPATIENT
Start: 2021-01-01 | End: 2021-01-01

## 2021-01-01 RX ORDER — LANOLIN ALCOHOL/MO/W.PET/CERES
325 CREAM (GRAM) TOPICAL
COMMUNITY

## 2021-01-01 RX ORDER — CALCIUM CHLORIDE INJECTION 100 MG/ML
INJECTION, SOLUTION INTRAVENOUS
Status: COMPLETED | OUTPATIENT
Start: 2021-01-01 | End: 2021-01-01

## 2021-01-01 RX ORDER — DEXAMETHASONE SODIUM PHOSPHATE 4 MG/ML
6 INJECTION, SOLUTION INTRA-ARTICULAR; INTRALESIONAL; INTRAMUSCULAR; INTRAVENOUS; SOFT TISSUE EVERY 24 HOURS
Status: DISCONTINUED | OUTPATIENT
Start: 2021-01-01 | End: 2021-01-01

## 2021-01-01 RX ORDER — SODIUM CHLORIDE 9 MG/ML
250 INJECTION, SOLUTION INTRAVENOUS AS NEEDED
Status: DISCONTINUED | OUTPATIENT
Start: 2021-01-01 | End: 2021-01-01 | Stop reason: HOSPADM

## 2021-01-01 RX ORDER — DOXYCYCLINE 100 MG/1
100 CAPSULE ORAL 2 TIMES DAILY
COMMUNITY

## 2021-01-01 RX ORDER — LORAZEPAM 2 MG/ML
1 INJECTION INTRAMUSCULAR
Status: DISCONTINUED | OUTPATIENT
Start: 2021-01-01 | End: 2021-01-01 | Stop reason: HOSPADM

## 2021-01-01 RX ORDER — SODIUM CHLORIDE 9 MG/ML
1000 INJECTION, SOLUTION INTRAVENOUS ONCE
Status: COMPLETED | OUTPATIENT
Start: 2021-01-01 | End: 2021-01-01

## 2021-01-01 RX ORDER — ENOXAPARIN SODIUM 100 MG/ML
40 INJECTION SUBCUTANEOUS EVERY 12 HOURS
Status: DISCONTINUED | OUTPATIENT
Start: 2021-01-01 | End: 2021-01-01

## 2021-01-01 RX ORDER — SODIUM CHLORIDE 0.9 % (FLUSH) 0.9 %
5-40 SYRINGE (ML) INJECTION AS NEEDED
Status: DISCONTINUED | OUTPATIENT
Start: 2021-01-01 | End: 2021-01-01 | Stop reason: HOSPADM

## 2021-01-01 RX ORDER — SODIUM BICARBONATE 1 MEQ/ML
SYRINGE (ML) INTRAVENOUS
Status: COMPLETED | OUTPATIENT
Start: 2021-01-01 | End: 2021-01-01

## 2021-01-01 RX ORDER — EPINEPHRINE 0.1 MG/ML
INJECTION INTRACARDIAC; INTRAVENOUS
Status: COMPLETED | OUTPATIENT
Start: 2021-01-01 | End: 2021-01-01

## 2021-01-01 RX ORDER — ACETAMINOPHEN 325 MG/1
650 TABLET ORAL
Status: DISCONTINUED | OUTPATIENT
Start: 2021-01-01 | End: 2021-01-01 | Stop reason: HOSPADM

## 2021-01-01 RX ORDER — INSULIN GLARGINE 100 [IU]/ML
10 INJECTION, SOLUTION SUBCUTANEOUS
Status: DISCONTINUED | OUTPATIENT
Start: 2021-01-01 | End: 2021-01-01 | Stop reason: HOSPADM

## 2021-01-01 RX ORDER — ONDANSETRON 2 MG/ML
4 INJECTION INTRAMUSCULAR; INTRAVENOUS
Status: DISCONTINUED | OUTPATIENT
Start: 2021-01-01 | End: 2021-01-01

## 2021-01-01 RX ORDER — LORAZEPAM 2 MG/ML
1 INJECTION INTRAMUSCULAR ONCE
Status: COMPLETED | OUTPATIENT
Start: 2021-01-01 | End: 2021-01-01

## 2021-01-01 RX ORDER — FUROSEMIDE 10 MG/ML
20 INJECTION INTRAMUSCULAR; INTRAVENOUS 2 TIMES DAILY
Status: DISCONTINUED | OUTPATIENT
Start: 2021-01-01 | End: 2021-01-01

## 2021-01-01 RX ORDER — MAGNESIUM SULFATE 100 %
4 CRYSTALS MISCELLANEOUS AS NEEDED
Status: DISCONTINUED | OUTPATIENT
Start: 2021-01-01 | End: 2021-01-01 | Stop reason: HOSPADM

## 2021-01-01 RX ORDER — CEFEPIME HYDROCHLORIDE 2 G/1
2 INJECTION, POWDER, FOR SOLUTION INTRAVENOUS EVERY 12 HOURS
Status: DISCONTINUED | OUTPATIENT
Start: 2021-01-01 | End: 2021-01-01 | Stop reason: DRUGHIGH

## 2021-01-01 RX ORDER — INSULIN LISPRO 100 [IU]/ML
INJECTION, SOLUTION INTRAVENOUS; SUBCUTANEOUS EVERY 6 HOURS
Status: DISCONTINUED | OUTPATIENT
Start: 2021-01-01 | End: 2021-01-01 | Stop reason: HOSPADM

## 2021-01-01 RX ORDER — TAMSULOSIN HYDROCHLORIDE 0.4 MG/1
0.4 CAPSULE ORAL DAILY
COMMUNITY

## 2021-01-01 RX ORDER — SODIUM CHLORIDE, SODIUM LACTATE, POTASSIUM CHLORIDE, CALCIUM CHLORIDE 600; 310; 30; 20 MG/100ML; MG/100ML; MG/100ML; MG/100ML
1000 INJECTION, SOLUTION INTRAVENOUS CONTINUOUS
Status: DISCONTINUED | OUTPATIENT
Start: 2021-01-01 | End: 2021-01-01

## 2021-01-01 RX ORDER — GUAIFENESIN 100 MG/5ML
200 SOLUTION ORAL
COMMUNITY

## 2021-01-01 RX ORDER — SODIUM BICARBONATE 1 MEQ/ML
SYRINGE (ML) INTRAVENOUS
Status: COMPLETED
Start: 2021-01-01 | End: 2021-01-01

## 2021-01-01 RX ORDER — OMEPRAZOLE 40 MG/1
40 CAPSULE, DELAYED RELEASE ORAL DAILY
COMMUNITY

## 2021-01-01 RX ORDER — INSULIN LISPRO 100 [IU]/ML
INJECTION, SOLUTION INTRAVENOUS; SUBCUTANEOUS
Status: DISCONTINUED | OUTPATIENT
Start: 2021-01-01 | End: 2021-01-01

## 2021-01-01 RX ORDER — FUROSEMIDE 10 MG/ML
20 INJECTION INTRAMUSCULAR; INTRAVENOUS DAILY
Status: DISCONTINUED | OUTPATIENT
Start: 2021-01-01 | End: 2021-01-01

## 2021-01-01 RX ORDER — ACETAMINOPHEN 650 MG/1
650 SUPPOSITORY RECTAL
Status: DISCONTINUED | OUTPATIENT
Start: 2021-01-01 | End: 2021-01-01

## 2021-01-01 RX ORDER — POTASSIUM CHLORIDE 7.45 MG/ML
10 INJECTION INTRAVENOUS
Status: COMPLETED | OUTPATIENT
Start: 2021-01-01 | End: 2021-01-01

## 2021-01-01 RX ORDER — LANOLIN ALCOHOL/MO/W.PET/CERES
1 CREAM (GRAM) TOPICAL
Status: DISCONTINUED | OUTPATIENT
Start: 2021-01-01 | End: 2021-01-01 | Stop reason: HOSPADM

## 2021-01-01 RX ORDER — FUROSEMIDE 10 MG/ML
40 INJECTION INTRAMUSCULAR; INTRAVENOUS 2 TIMES DAILY
Status: DISCONTINUED | OUTPATIENT
Start: 2021-01-01 | End: 2021-01-01 | Stop reason: HOSPADM

## 2021-01-01 RX ORDER — GUAIFENESIN 100 MG/5ML
100 SOLUTION ORAL
Status: DISCONTINUED | OUTPATIENT
Start: 2021-01-01 | End: 2021-01-01 | Stop reason: HOSPADM

## 2021-01-01 RX ORDER — PREDNISONE 10 MG/1
TABLET ORAL
COMMUNITY

## 2021-01-01 RX ORDER — ALBUTEROL SULFATE 90 UG/1
2 AEROSOL, METERED RESPIRATORY (INHALATION)
Status: DISCONTINUED | OUTPATIENT
Start: 2021-01-01 | End: 2021-01-01 | Stop reason: HOSPADM

## 2021-01-01 RX ORDER — ACETAMINOPHEN 325 MG/1
650 TABLET ORAL
Status: DISCONTINUED | OUTPATIENT
Start: 2021-01-01 | End: 2021-01-01

## 2021-01-01 RX ORDER — ROCURONIUM BROMIDE 10 MG/ML
INJECTION, SOLUTION INTRAVENOUS
Status: DISCONTINUED
Start: 2021-01-01 | End: 2021-01-01 | Stop reason: HOSPADM

## 2021-01-01 RX ORDER — FOLIC ACID 1 MG/1
1 TABLET ORAL DAILY
COMMUNITY

## 2021-01-01 RX ORDER — POLYETHYLENE GLYCOL 3350 17 G/17G
17 POWDER, FOR SOLUTION ORAL DAILY PRN
Status: DISCONTINUED | OUTPATIENT
Start: 2021-01-01 | End: 2021-01-01 | Stop reason: HOSPADM

## 2021-01-01 RX ORDER — SODIUM BICARBONATE 1 MEQ/ML
50 SYRINGE (ML) INTRAVENOUS
Status: DISCONTINUED | OUTPATIENT
Start: 2021-01-01 | End: 2021-01-01 | Stop reason: HOSPADM

## 2021-01-01 RX ORDER — SODIUM BICARBONATE 84 MG/ML
100 INJECTION, SOLUTION INTRAVENOUS
Status: COMPLETED | OUTPATIENT
Start: 2021-01-01 | End: 2021-01-01

## 2021-01-01 RX ORDER — ENOXAPARIN SODIUM 100 MG/ML
100 INJECTION SUBCUTANEOUS EVERY 12 HOURS
Status: DISCONTINUED | OUTPATIENT
Start: 2021-01-01 | End: 2021-01-01 | Stop reason: HOSPADM

## 2021-01-01 RX ORDER — ONDANSETRON 2 MG/ML
4 INJECTION INTRAMUSCULAR; INTRAVENOUS
Status: DISCONTINUED | OUTPATIENT
Start: 2021-01-01 | End: 2021-01-01 | Stop reason: HOSPADM

## 2021-01-01 RX ORDER — TAMSULOSIN HYDROCHLORIDE 0.4 MG/1
0.4 CAPSULE ORAL DAILY
Status: DISCONTINUED | OUTPATIENT
Start: 2021-01-01 | End: 2021-01-01 | Stop reason: HOSPADM

## 2021-01-01 RX ORDER — SODIUM CHLORIDE 9 MG/ML
75 INJECTION, SOLUTION INTRAVENOUS CONTINUOUS
Status: DISPENSED | OUTPATIENT
Start: 2021-01-01 | End: 2021-01-01

## 2021-01-01 RX ORDER — PROMETHAZINE HYDROCHLORIDE 25 MG/1
12.5 TABLET ORAL
Status: DISCONTINUED | OUTPATIENT
Start: 2021-01-01 | End: 2021-01-01 | Stop reason: HOSPADM

## 2021-01-01 RX ORDER — ROCURONIUM BROMIDE 10 MG/ML
20 INJECTION, SOLUTION INTRAVENOUS
Status: COMPLETED | OUTPATIENT
Start: 2021-01-01 | End: 2021-01-01

## 2021-01-01 RX ORDER — FUROSEMIDE 10 MG/ML
20 INJECTION INTRAMUSCULAR; INTRAVENOUS ONCE
Status: COMPLETED | OUTPATIENT
Start: 2021-01-01 | End: 2021-01-01

## 2021-01-01 RX ORDER — SODIUM BICARBONATE 1 MEQ/ML
100 SYRINGE (ML) INTRAVENOUS
Status: COMPLETED | OUTPATIENT
Start: 2021-01-01 | End: 2021-01-01

## 2021-01-01 RX ORDER — SODIUM BICARBONATE 1 MEQ/ML
150 SYRINGE (ML) INTRAVENOUS
Status: COMPLETED | OUTPATIENT
Start: 2021-01-01 | End: 2021-01-01

## 2021-01-01 RX ORDER — SODIUM BICARBONATE 84 MG/ML
100 INJECTION, SOLUTION INTRAVENOUS
Status: DISCONTINUED | OUTPATIENT
Start: 2021-01-01 | End: 2021-01-01 | Stop reason: HOSPADM

## 2021-01-01 RX ORDER — ALBUMIN HUMAN 50 G/1000ML
25 SOLUTION INTRAVENOUS ONCE
Status: COMPLETED | OUTPATIENT
Start: 2021-01-01 | End: 2021-01-01

## 2021-01-01 RX ORDER — CALCIUM CHLORIDE INJECTION 100 MG/ML
1 INJECTION, SOLUTION INTRAVENOUS ONCE
Status: COMPLETED | OUTPATIENT
Start: 2021-01-01 | End: 2021-01-01

## 2021-01-01 RX ORDER — AMIODARONE HYDROCHLORIDE 150 MG/3ML
INJECTION, SOLUTION INTRAVENOUS
Status: COMPLETED | OUTPATIENT
Start: 2021-01-01 | End: 2021-01-01

## 2021-01-01 RX ORDER — CHLORHEXIDINE GLUCONATE 0.12 MG/ML
15 RINSE ORAL EVERY 12 HOURS
Status: DISCONTINUED | OUTPATIENT
Start: 2021-01-01 | End: 2021-01-01 | Stop reason: HOSPADM

## 2021-01-01 RX ORDER — SODIUM CHLORIDE 0.9 % (FLUSH) 0.9 %
5-40 SYRINGE (ML) INJECTION EVERY 8 HOURS
Status: DISCONTINUED | OUTPATIENT
Start: 2021-01-01 | End: 2021-01-01 | Stop reason: HOSPADM

## 2021-01-01 RX ORDER — FLUTICASONE PROPIONATE 50 MCG
2 SPRAY, SUSPENSION (ML) NASAL
Status: DISCONTINUED | OUTPATIENT
Start: 2021-01-01 | End: 2021-01-01 | Stop reason: HOSPADM

## 2021-01-01 RX ORDER — ROCURONIUM BROMIDE 10 MG/ML
100 INJECTION, SOLUTION INTRAVENOUS
Status: COMPLETED | OUTPATIENT
Start: 2021-01-01 | End: 2021-01-01

## 2021-01-01 RX ORDER — DEXTROSE 50 % IN WATER (D50W) INTRAVENOUS SYRINGE
12.5-25 AS NEEDED
Status: DISCONTINUED | OUTPATIENT
Start: 2021-01-01 | End: 2021-01-01 | Stop reason: HOSPADM

## 2021-01-01 RX ADMIN — FERROUS SULFATE TAB 325 MG (65 MG ELEMENTAL FE) 325 MG: 325 (65 FE) TAB at 08:00

## 2021-01-01 RX ADMIN — INSULIN LISPRO 5 UNITS: 100 INJECTION, SOLUTION INTRAVENOUS; SUBCUTANEOUS at 13:01

## 2021-01-01 RX ADMIN — ACYCLOVIR 400 MG: 800 TABLET ORAL at 10:10

## 2021-01-01 RX ADMIN — FUROSEMIDE 20 MG: 10 INJECTION, SOLUTION INTRAMUSCULAR; INTRAVENOUS at 09:42

## 2021-01-01 RX ADMIN — Medication 10 ML: at 22:00

## 2021-01-01 RX ADMIN — ACYCLOVIR 400 MG: 800 TABLET ORAL at 10:17

## 2021-01-01 RX ADMIN — FLUOXETINE 40 MG: 20 CAPSULE ORAL at 09:03

## 2021-01-01 RX ADMIN — CEFEPIME HYDROCHLORIDE 2 G: 2 INJECTION, POWDER, FOR SOLUTION INTRAVENOUS at 10:18

## 2021-01-01 RX ADMIN — ALBUTEROL SULFATE 2 PUFF: 90 AEROSOL, METERED RESPIRATORY (INHALATION) at 19:14

## 2021-01-01 RX ADMIN — Medication 10 ML: at 13:00

## 2021-01-01 RX ADMIN — FERROUS SULFATE TAB 325 MG (65 MG ELEMENTAL FE) 325 MG: 325 (65 FE) TAB at 18:16

## 2021-01-01 RX ADMIN — CALCIUM CHLORIDE 1 G: 100 INJECTION INTRAVENOUS; INTRAVENTRICULAR at 03:30

## 2021-01-01 RX ADMIN — FERROUS SULFATE TAB 325 MG (65 MG ELEMENTAL FE) 325 MG: 325 (65 FE) TAB at 16:25

## 2021-01-01 RX ADMIN — FOLIC ACID 1 MG: 1 TABLET ORAL at 10:17

## 2021-01-01 RX ADMIN — ACYCLOVIR 400 MG: 800 TABLET ORAL at 18:33

## 2021-01-01 RX ADMIN — FERROUS SULFATE TAB 325 MG (65 MG ELEMENTAL FE) 325 MG: 325 (65 FE) TAB at 12:41

## 2021-01-01 RX ADMIN — FERROUS SULFATE TAB 325 MG (65 MG ELEMENTAL FE) 325 MG: 325 (65 FE) TAB at 10:38

## 2021-01-01 RX ADMIN — ACYCLOVIR 400 MG: 800 TABLET ORAL at 17:55

## 2021-01-01 RX ADMIN — AMLODIPINE BESYLATE 5 MG: 5 TABLET ORAL at 09:59

## 2021-01-01 RX ADMIN — FOLIC ACID 1 MG: 1 TABLET ORAL at 09:52

## 2021-01-01 RX ADMIN — FLUOXETINE 40 MG: 20 CAPSULE ORAL at 09:50

## 2021-01-01 RX ADMIN — FLUOXETINE 40 MG: 20 CAPSULE ORAL at 10:17

## 2021-01-01 RX ADMIN — FERROUS SULFATE TAB 325 MG (65 MG ELEMENTAL FE) 325 MG: 325 (65 FE) TAB at 13:25

## 2021-01-01 RX ADMIN — Medication 10 ML: at 07:28

## 2021-01-01 RX ADMIN — ALBUTEROL SULFATE 2 PUFF: 90 AEROSOL, METERED RESPIRATORY (INHALATION) at 14:23

## 2021-01-01 RX ADMIN — ALBUTEROL SULFATE 2 PUFF: 90 AEROSOL, METERED RESPIRATORY (INHALATION) at 14:00

## 2021-01-01 RX ADMIN — TAMSULOSIN HYDROCHLORIDE 0.4 MG: 0.4 CAPSULE ORAL at 09:53

## 2021-01-01 RX ADMIN — INSULIN LISPRO 2 UNITS: 100 INJECTION, SOLUTION INTRAVENOUS; SUBCUTANEOUS at 12:41

## 2021-01-01 RX ADMIN — ACYCLOVIR 400 MG: 800 TABLET ORAL at 18:16

## 2021-01-01 RX ADMIN — Medication 10 ML: at 15:56

## 2021-01-01 RX ADMIN — Medication 10 ML: at 06:40

## 2021-01-01 RX ADMIN — ENOXAPARIN SODIUM 100 MG: 100 INJECTION SUBCUTANEOUS at 13:19

## 2021-01-01 RX ADMIN — Medication 10 ML: at 05:04

## 2021-01-01 RX ADMIN — ACYCLOVIR 400 MG: 800 TABLET ORAL at 16:42

## 2021-01-01 RX ADMIN — Medication 10 ML: at 07:51

## 2021-01-01 RX ADMIN — FUROSEMIDE 20 MG: 10 INJECTION, SOLUTION INTRAMUSCULAR; INTRAVENOUS at 10:43

## 2021-01-01 RX ADMIN — FERROUS SULFATE TAB 325 MG (65 MG ELEMENTAL FE) 325 MG: 325 (65 FE) TAB at 17:55

## 2021-01-01 RX ADMIN — Medication 10 ML: at 15:58

## 2021-01-01 RX ADMIN — Medication 10 ML: at 13:36

## 2021-01-01 RX ADMIN — Medication 10 ML: at 22:15

## 2021-01-01 RX ADMIN — ENOXAPARIN SODIUM 100 MG: 100 INJECTION SUBCUTANEOUS at 12:41

## 2021-01-01 RX ADMIN — Medication 10 ML: at 06:00

## 2021-01-01 RX ADMIN — INSULIN LISPRO 3 UNITS: 100 INJECTION, SOLUTION INTRAVENOUS; SUBCUTANEOUS at 10:15

## 2021-01-01 RX ADMIN — ALBUTEROL SULFATE 2 PUFF: 90 AEROSOL, METERED RESPIRATORY (INHALATION) at 20:15

## 2021-01-01 RX ADMIN — SODIUM CHLORIDE 40 MG: 9 INJECTION INTRAMUSCULAR; INTRAVENOUS; SUBCUTANEOUS at 09:55

## 2021-01-01 RX ADMIN — Medication 10 ML: at 17:55

## 2021-01-01 RX ADMIN — CALCIUM CHLORIDE 1 G: 100 INJECTION, SOLUTION INTRAVENOUS; INTRAVENTRICULAR at 04:52

## 2021-01-01 RX ADMIN — FLUTICASONE PROPIONATE 2 SPRAY: 50 SPRAY, METERED NASAL at 21:29

## 2021-01-01 RX ADMIN — SODIUM CHLORIDE, POTASSIUM CHLORIDE, SODIUM LACTATE AND CALCIUM CHLORIDE 1000 ML: 600; 310; 30; 20 INJECTION, SOLUTION INTRAVENOUS at 02:20

## 2021-01-01 RX ADMIN — EPINEPHRINE 30 MCG/MIN: 1 INJECTION INTRAMUSCULAR; INTRAVENOUS; SUBCUTANEOUS at 03:43

## 2021-01-01 RX ADMIN — ALBUTEROL SULFATE 2 PUFF: 90 AEROSOL, METERED RESPIRATORY (INHALATION) at 07:23

## 2021-01-01 RX ADMIN — INSULIN GLARGINE 5 UNITS: 100 INJECTION, SOLUTION SUBCUTANEOUS at 10:12

## 2021-01-01 RX ADMIN — INSULIN LISPRO 2 UNITS: 100 INJECTION, SOLUTION INTRAVENOUS; SUBCUTANEOUS at 09:43

## 2021-01-01 RX ADMIN — ALBUTEROL SULFATE 2 PUFF: 90 AEROSOL, METERED RESPIRATORY (INHALATION) at 07:38

## 2021-01-01 RX ADMIN — FUROSEMIDE 20 MG: 10 INJECTION, SOLUTION INTRAMUSCULAR; INTRAVENOUS at 17:20

## 2021-01-01 RX ADMIN — ALBUTEROL SULFATE 2 PUFF: 90 AEROSOL, METERED RESPIRATORY (INHALATION) at 15:02

## 2021-01-01 RX ADMIN — FERROUS SULFATE TAB 325 MG (65 MG ELEMENTAL FE) 325 MG: 325 (65 FE) TAB at 17:57

## 2021-01-01 RX ADMIN — FLUTICASONE PROPIONATE 2 SPRAY: 50 SPRAY, METERED NASAL at 22:00

## 2021-01-01 RX ADMIN — FUROSEMIDE 20 MG: 10 INJECTION, SOLUTION INTRAMUSCULAR; INTRAVENOUS at 18:55

## 2021-01-01 RX ADMIN — METHYLPREDNISOLONE SODIUM SUCCINATE 60 MG: 40 INJECTION, POWDER, FOR SOLUTION INTRAMUSCULAR; INTRAVENOUS at 00:11

## 2021-01-01 RX ADMIN — FLUOXETINE 40 MG: 20 CAPSULE ORAL at 08:54

## 2021-01-01 RX ADMIN — Medication 10 ML: at 16:55

## 2021-01-01 RX ADMIN — EPINEPHRINE 1 MG: 0.1 INJECTION INTRACARDIAC; INTRAVENOUS at 04:52

## 2021-01-01 RX ADMIN — Medication 10 ML: at 21:58

## 2021-01-01 RX ADMIN — POTASSIUM CHLORIDE 10 MEQ: 10 INJECTION, SOLUTION INTRAVENOUS at 16:02

## 2021-01-01 RX ADMIN — ACYCLOVIR 400 MG: 800 TABLET ORAL at 09:42

## 2021-01-01 RX ADMIN — INSULIN LISPRO 2 UNITS: 100 INJECTION, SOLUTION INTRAVENOUS; SUBCUTANEOUS at 12:29

## 2021-01-01 RX ADMIN — FOLIC ACID 1 MG: 1 TABLET ORAL at 09:03

## 2021-01-01 RX ADMIN — FUROSEMIDE 20 MG: 10 INJECTION, SOLUTION INTRAMUSCULAR; INTRAVENOUS at 18:33

## 2021-01-01 RX ADMIN — ACETAMINOPHEN 650 MG: 325 TABLET ORAL at 06:40

## 2021-01-01 RX ADMIN — ALBUTEROL SULFATE 2 PUFF: 90 AEROSOL, METERED RESPIRATORY (INHALATION) at 13:18

## 2021-01-01 RX ADMIN — EPINEPHRINE 1 MG: 0.1 INJECTION INTRACARDIAC; INTRAVENOUS at 00:44

## 2021-01-01 RX ADMIN — FOLIC ACID 1 MG: 1 TABLET ORAL at 08:53

## 2021-01-01 RX ADMIN — METHYLPREDNISOLONE SODIUM SUCCINATE 40 MG: 40 INJECTION, POWDER, FOR SOLUTION INTRAMUSCULAR; INTRAVENOUS at 10:42

## 2021-01-01 RX ADMIN — POTASSIUM CHLORIDE 10 MEQ: 10 INJECTION, SOLUTION INTRAVENOUS at 17:09

## 2021-01-01 RX ADMIN — ENOXAPARIN SODIUM 40 MG: 40 INJECTION SUBCUTANEOUS at 04:33

## 2021-01-01 RX ADMIN — TAMSULOSIN HYDROCHLORIDE 0.4 MG: 0.4 CAPSULE ORAL at 10:17

## 2021-01-01 RX ADMIN — Medication 10 ML: at 07:22

## 2021-01-01 RX ADMIN — ENOXAPARIN SODIUM 40 MG: 40 INJECTION SUBCUTANEOUS at 06:47

## 2021-01-01 RX ADMIN — Medication 10 ML: at 17:46

## 2021-01-01 RX ADMIN — INSULIN GLARGINE 5 UNITS: 100 INJECTION, SOLUTION SUBCUTANEOUS at 14:11

## 2021-01-01 RX ADMIN — ALBUTEROL SULFATE 2 PUFF: 90 AEROSOL, METERED RESPIRATORY (INHALATION) at 20:25

## 2021-01-01 RX ADMIN — ALBUTEROL SULFATE 2 PUFF: 90 AEROSOL, METERED RESPIRATORY (INHALATION) at 20:38

## 2021-01-01 RX ADMIN — TAMSULOSIN HYDROCHLORIDE 0.4 MG: 0.4 CAPSULE ORAL at 09:30

## 2021-01-01 RX ADMIN — SODIUM CHLORIDE 40 MG: 9 INJECTION INTRAMUSCULAR; INTRAVENOUS; SUBCUTANEOUS at 09:24

## 2021-01-01 RX ADMIN — Medication 10 ML: at 21:18

## 2021-01-01 RX ADMIN — INSULIN LISPRO 5 UNITS: 100 INJECTION, SOLUTION INTRAVENOUS; SUBCUTANEOUS at 17:51

## 2021-01-01 RX ADMIN — DEXAMETHASONE SODIUM PHOSPHATE 6 MG: 4 INJECTION, SOLUTION INTRA-ARTICULAR; INTRALESIONAL; INTRAMUSCULAR; INTRAVENOUS; SOFT TISSUE at 15:57

## 2021-01-01 RX ADMIN — Medication 10 ML: at 21:03

## 2021-01-01 RX ADMIN — FERROUS SULFATE TAB 325 MG (65 MG ELEMENTAL FE) 325 MG: 325 (65 FE) TAB at 17:45

## 2021-01-01 RX ADMIN — TAMSULOSIN HYDROCHLORIDE 0.4 MG: 0.4 CAPSULE ORAL at 09:42

## 2021-01-01 RX ADMIN — FERROUS SULFATE TAB 325 MG (65 MG ELEMENTAL FE) 325 MG: 325 (65 FE) TAB at 09:24

## 2021-01-01 RX ADMIN — Medication 10 ML: at 06:47

## 2021-01-01 RX ADMIN — METHYLPREDNISOLONE SODIUM SUCCINATE 60 MG: 40 INJECTION, POWDER, FOR SOLUTION INTRAMUSCULAR; INTRAVENOUS at 16:46

## 2021-01-01 RX ADMIN — ENOXAPARIN SODIUM 100 MG: 100 INJECTION SUBCUTANEOUS at 11:57

## 2021-01-01 RX ADMIN — Medication 10 ML: at 23:35

## 2021-01-01 RX ADMIN — Medication 10 ML: at 16:46

## 2021-01-01 RX ADMIN — FERROUS SULFATE TAB 325 MG (65 MG ELEMENTAL FE) 325 MG: 325 (65 FE) TAB at 08:53

## 2021-01-01 RX ADMIN — FOLIC ACID 1 MG: 1 TABLET ORAL at 10:04

## 2021-01-01 RX ADMIN — Medication 10 ML: at 17:18

## 2021-01-01 RX ADMIN — LORAZEPAM 1 MG: 2 INJECTION INTRAMUSCULAR; INTRAVENOUS at 22:36

## 2021-01-01 RX ADMIN — Medication 10 ML: at 05:59

## 2021-01-01 RX ADMIN — VASOPRESSIN 0.04 UNITS/MIN: 20 INJECTION INTRAVENOUS at 01:52

## 2021-01-01 RX ADMIN — Medication 10 ML: at 07:16

## 2021-01-01 RX ADMIN — ALBUTEROL SULFATE 2 PUFF: 90 AEROSOL, METERED RESPIRATORY (INHALATION) at 07:40

## 2021-01-01 RX ADMIN — INSULIN LISPRO 2 UNITS: 100 INJECTION, SOLUTION INTRAVENOUS; SUBCUTANEOUS at 09:53

## 2021-01-01 RX ADMIN — FLUOXETINE 40 MG: 20 CAPSULE ORAL at 08:52

## 2021-01-01 RX ADMIN — EPINEPHRINE 1 MG: 0.1 INJECTION INTRACARDIAC; INTRAVENOUS at 00:28

## 2021-01-01 RX ADMIN — POTASSIUM CHLORIDE 10 MEQ: 10 INJECTION, SOLUTION INTRAVENOUS at 18:23

## 2021-01-01 RX ADMIN — ENOXAPARIN SODIUM 100 MG: 100 INJECTION SUBCUTANEOUS at 01:00

## 2021-01-01 RX ADMIN — CALCIUM CHLORIDE 1 G: 100 INJECTION, SOLUTION INTRAVENOUS; INTRAVENTRICULAR at 03:03

## 2021-01-01 RX ADMIN — Medication 10 ML: at 22:35

## 2021-01-01 RX ADMIN — CEFEPIME HYDROCHLORIDE 2 G: 2 INJECTION, POWDER, FOR SOLUTION INTRAVENOUS at 17:40

## 2021-01-01 RX ADMIN — FLUOXETINE 40 MG: 20 CAPSULE ORAL at 09:52

## 2021-01-01 RX ADMIN — SODIUM CHLORIDE 40 MG: 9 INJECTION INTRAMUSCULAR; INTRAVENOUS; SUBCUTANEOUS at 08:50

## 2021-01-01 RX ADMIN — AMLODIPINE BESYLATE 5 MG: 5 TABLET ORAL at 10:17

## 2021-01-01 RX ADMIN — Medication 10 ML: at 21:33

## 2021-01-01 RX ADMIN — EPINEPHRINE 1 MG: 0.1 INJECTION INTRACARDIAC; INTRAVENOUS at 00:38

## 2021-01-01 RX ADMIN — ENOXAPARIN SODIUM 100 MG: 100 INJECTION SUBCUTANEOUS at 23:06

## 2021-01-01 RX ADMIN — Medication 10 ML: at 21:34

## 2021-01-01 RX ADMIN — ALBUTEROL SULFATE 2 PUFF: 90 AEROSOL, METERED RESPIRATORY (INHALATION) at 13:53

## 2021-01-01 RX ADMIN — METHYLPREDNISOLONE SODIUM SUCCINATE 40 MG: 40 INJECTION, POWDER, FOR SOLUTION INTRAMUSCULAR; INTRAVENOUS at 21:14

## 2021-01-01 RX ADMIN — SODIUM BICARBONATE 150 MEQ: 84 INJECTION, SOLUTION INTRAVENOUS at 02:00

## 2021-01-01 RX ADMIN — LEVOFLOXACIN 750 MG: 5 INJECTION, SOLUTION INTRAVENOUS at 18:13

## 2021-01-01 RX ADMIN — Medication 10 ML: at 23:13

## 2021-01-01 RX ADMIN — ACYCLOVIR 400 MG: 800 TABLET ORAL at 09:59

## 2021-01-01 RX ADMIN — ACYCLOVIR 400 MG: 800 TABLET ORAL at 08:54

## 2021-01-01 RX ADMIN — CEFTRIAXONE SODIUM 2 G: 2 INJECTION, POWDER, FOR SOLUTION INTRAMUSCULAR; INTRAVENOUS at 08:50

## 2021-01-01 RX ADMIN — TAMSULOSIN HYDROCHLORIDE 0.4 MG: 0.4 CAPSULE ORAL at 08:53

## 2021-01-01 RX ADMIN — AMLODIPINE BESYLATE 5 MG: 5 TABLET ORAL at 09:03

## 2021-01-01 RX ADMIN — ENOXAPARIN SODIUM 100 MG: 100 INJECTION SUBCUTANEOUS at 12:29

## 2021-01-01 RX ADMIN — FLUOXETINE 40 MG: 20 CAPSULE ORAL at 09:14

## 2021-01-01 RX ADMIN — INSULIN GLARGINE 5 UNITS: 100 INJECTION, SOLUTION SUBCUTANEOUS at 09:53

## 2021-01-01 RX ADMIN — ALBUTEROL SULFATE 2 PUFF: 90 AEROSOL, METERED RESPIRATORY (INHALATION) at 14:46

## 2021-01-01 RX ADMIN — SODIUM CHLORIDE 40 MG: 9 INJECTION INTRAMUSCULAR; INTRAVENOUS; SUBCUTANEOUS at 10:00

## 2021-01-01 RX ADMIN — SODIUM BICARBONATE 100 MEQ: 84 INJECTION, SOLUTION INTRAVENOUS at 03:34

## 2021-01-01 RX ADMIN — FLUOXETINE 40 MG: 20 CAPSULE ORAL at 09:30

## 2021-01-01 RX ADMIN — ALBUTEROL SULFATE 2 PUFF: 90 AEROSOL, METERED RESPIRATORY (INHALATION) at 07:50

## 2021-01-01 RX ADMIN — TAMSULOSIN HYDROCHLORIDE 0.4 MG: 0.4 CAPSULE ORAL at 10:38

## 2021-01-01 RX ADMIN — Medication 10 ML: at 18:34

## 2021-01-01 RX ADMIN — FLUOXETINE 40 MG: 20 CAPSULE ORAL at 09:59

## 2021-01-01 RX ADMIN — ACETAMINOPHEN 650 MG: 325 TABLET ORAL at 22:25

## 2021-01-01 RX ADMIN — LORAZEPAM 1 MG: 2 INJECTION INTRAMUSCULAR; INTRAVENOUS at 19:05

## 2021-01-01 RX ADMIN — ACYCLOVIR 400 MG: 800 TABLET ORAL at 16:45

## 2021-01-01 RX ADMIN — EPINEPHRINE 1 MG: 0.1 INJECTION INTRACARDIAC; INTRAVENOUS at 04:49

## 2021-01-01 RX ADMIN — Medication 10 ML: at 12:29

## 2021-01-01 RX ADMIN — ROCURONIUM BROMIDE 20 MG: 10 INJECTION INTRAVENOUS at 01:58

## 2021-01-01 RX ADMIN — SODIUM CHLORIDE 40 MG: 9 INJECTION INTRAMUSCULAR; INTRAVENOUS; SUBCUTANEOUS at 21:57

## 2021-01-01 RX ADMIN — METHYLPREDNISOLONE SODIUM SUCCINATE 60 MG: 40 INJECTION, POWDER, FOR SOLUTION INTRAMUSCULAR; INTRAVENOUS at 13:24

## 2021-01-01 RX ADMIN — FUROSEMIDE 20 MG: 10 INJECTION, SOLUTION INTRAMUSCULAR; INTRAVENOUS at 17:55

## 2021-01-01 RX ADMIN — INSULIN GLARGINE 5 UNITS: 100 INJECTION, SOLUTION SUBCUTANEOUS at 10:39

## 2021-01-01 RX ADMIN — EPINEPHRINE 1 MG: 0.1 INJECTION INTRACARDIAC; INTRAVENOUS at 03:04

## 2021-01-01 RX ADMIN — FERROUS SULFATE TAB 325 MG (65 MG ELEMENTAL FE) 325 MG: 325 (65 FE) TAB at 09:50

## 2021-01-01 RX ADMIN — ENOXAPARIN SODIUM 100 MG: 100 INJECTION SUBCUTANEOUS at 11:01

## 2021-01-01 RX ADMIN — METHYLPREDNISOLONE SODIUM SUCCINATE 60 MG: 40 INJECTION, POWDER, FOR SOLUTION INTRAMUSCULAR; INTRAVENOUS at 14:11

## 2021-01-01 RX ADMIN — ACYCLOVIR 400 MG: 800 TABLET ORAL at 17:40

## 2021-01-01 RX ADMIN — PIPERACILLIN AND TAZOBACTAM 3.38 G: 3; .375 INJECTION, POWDER, LYOPHILIZED, FOR SOLUTION INTRAVENOUS at 04:33

## 2021-01-01 RX ADMIN — LORAZEPAM 1 MG: 2 INJECTION INTRAMUSCULAR; INTRAVENOUS at 02:29

## 2021-01-01 RX ADMIN — SODIUM BICARBONATE 50 MEQ: 84 INJECTION, SOLUTION INTRAVENOUS at 00:32

## 2021-01-01 RX ADMIN — INSULIN LISPRO 5 UNITS: 100 INJECTION, SOLUTION INTRAVENOUS; SUBCUTANEOUS at 12:55

## 2021-01-01 RX ADMIN — ENOXAPARIN SODIUM 100 MG: 100 INJECTION SUBCUTANEOUS at 12:28

## 2021-01-01 RX ADMIN — FERROUS SULFATE TAB 325 MG (65 MG ELEMENTAL FE) 325 MG: 325 (65 FE) TAB at 09:15

## 2021-01-01 RX ADMIN — SODIUM BICARBONATE 150 MEQ: 84 INJECTION, SOLUTION INTRAVENOUS at 01:51

## 2021-01-01 RX ADMIN — ALBUTEROL SULFATE 2 PUFF: 90 AEROSOL, METERED RESPIRATORY (INHALATION) at 14:01

## 2021-01-01 RX ADMIN — METHYLPREDNISOLONE SODIUM SUCCINATE 60 MG: 40 INJECTION, POWDER, FOR SOLUTION INTRAMUSCULAR; INTRAVENOUS at 09:53

## 2021-01-01 RX ADMIN — FOLIC ACID 1 MG: 1 TABLET ORAL at 10:10

## 2021-01-01 RX ADMIN — Medication 10 ML: at 22:30

## 2021-01-01 RX ADMIN — SODIUM CHLORIDE 1000 ML: 9 INJECTION, SOLUTION INTRAVENOUS at 04:33

## 2021-01-01 RX ADMIN — FERROUS SULFATE TAB 325 MG (65 MG ELEMENTAL FE) 325 MG: 325 (65 FE) TAB at 16:44

## 2021-01-01 RX ADMIN — Medication 10 ML: at 05:11

## 2021-01-01 RX ADMIN — METHYLPREDNISOLONE SODIUM SUCCINATE 60 MG: 40 INJECTION, POWDER, FOR SOLUTION INTRAMUSCULAR; INTRAVENOUS at 20:45

## 2021-01-01 RX ADMIN — ENOXAPARIN SODIUM 100 MG: 100 INJECTION SUBCUTANEOUS at 10:03

## 2021-01-01 RX ADMIN — ENOXAPARIN SODIUM 100 MG: 100 INJECTION SUBCUTANEOUS at 12:32

## 2021-01-01 RX ADMIN — METHYLPREDNISOLONE SODIUM SUCCINATE 60 MG: 40 INJECTION, POWDER, FOR SOLUTION INTRAMUSCULAR; INTRAVENOUS at 09:41

## 2021-01-01 RX ADMIN — FERROUS SULFATE TAB 325 MG (65 MG ELEMENTAL FE) 325 MG: 325 (65 FE) TAB at 17:51

## 2021-01-01 RX ADMIN — FOLIC ACID 1 MG: 1 TABLET ORAL at 09:49

## 2021-01-01 RX ADMIN — Medication 10 ML: at 04:33

## 2021-01-01 RX ADMIN — ACYCLOVIR 400 MG: 800 TABLET ORAL at 17:51

## 2021-01-01 RX ADMIN — AMLODIPINE BESYLATE 5 MG: 5 TABLET ORAL at 09:42

## 2021-01-01 RX ADMIN — Medication 10 ML: at 07:10

## 2021-01-01 RX ADMIN — FUROSEMIDE 20 MG: 20 TABLET ORAL at 10:05

## 2021-01-01 RX ADMIN — FUROSEMIDE 20 MG: 10 INJECTION, SOLUTION INTRAMUSCULAR; INTRAVENOUS at 16:45

## 2021-01-01 RX ADMIN — ACYCLOVIR 400 MG: 800 TABLET ORAL at 17:09

## 2021-01-01 RX ADMIN — SODIUM CHLORIDE 75 ML/HR: 9 INJECTION, SOLUTION INTRAVENOUS at 17:40

## 2021-01-01 RX ADMIN — Medication 10 ML: at 22:03

## 2021-01-01 RX ADMIN — INSULIN GLARGINE 10 UNITS: 100 INJECTION, SOLUTION SUBCUTANEOUS at 23:26

## 2021-01-01 RX ADMIN — METHYLPREDNISOLONE SODIUM SUCCINATE 60 MG: 40 INJECTION, POWDER, FOR SOLUTION INTRAMUSCULAR; INTRAVENOUS at 23:05

## 2021-01-01 RX ADMIN — ALBUTEROL SULFATE 2 PUFF: 90 AEROSOL, METERED RESPIRATORY (INHALATION) at 07:28

## 2021-01-01 RX ADMIN — INSULIN GLARGINE 5 UNITS: 100 INJECTION, SOLUTION SUBCUTANEOUS at 09:07

## 2021-01-01 RX ADMIN — FERROUS SULFATE TAB 325 MG (65 MG ELEMENTAL FE) 325 MG: 325 (65 FE) TAB at 16:55

## 2021-01-01 RX ADMIN — METHYLPREDNISOLONE SODIUM SUCCINATE 60 MG: 40 INJECTION, POWDER, FOR SOLUTION INTRAMUSCULAR; INTRAVENOUS at 06:20

## 2021-01-01 RX ADMIN — Medication 5 ML: at 14:00

## 2021-01-01 RX ADMIN — SODIUM CHLORIDE 1000 ML: 900 INJECTION, SOLUTION INTRAVENOUS at 01:00

## 2021-01-01 RX ADMIN — INSULIN LISPRO 2 UNITS: 100 INJECTION, SOLUTION INTRAVENOUS; SUBCUTANEOUS at 09:06

## 2021-01-01 RX ADMIN — SODIUM CHLORIDE 1000 ML: 9 INJECTION, SOLUTION INTRAVENOUS at 15:56

## 2021-01-01 RX ADMIN — FLUOXETINE 40 MG: 20 CAPSULE ORAL at 10:38

## 2021-01-01 RX ADMIN — AMLODIPINE BESYLATE 5 MG: 5 TABLET ORAL at 09:53

## 2021-01-01 RX ADMIN — SODIUM CHLORIDE, POTASSIUM CHLORIDE, SODIUM LACTATE AND CALCIUM CHLORIDE 1000 ML: 600; 310; 30; 20 INJECTION, SOLUTION INTRAVENOUS at 03:37

## 2021-01-01 RX ADMIN — ACYCLOVIR 400 MG: 800 TABLET ORAL at 09:49

## 2021-01-01 RX ADMIN — METHYLPREDNISOLONE SODIUM SUCCINATE 40 MG: 40 INJECTION, POWDER, FOR SOLUTION INTRAMUSCULAR; INTRAVENOUS at 21:29

## 2021-01-01 RX ADMIN — LORAZEPAM 1 MG: 2 INJECTION INTRAMUSCULAR; INTRAVENOUS at 21:29

## 2021-01-01 RX ADMIN — Medication 20 MCG/MIN: at 01:05

## 2021-01-01 RX ADMIN — Medication 10 ML: at 20:39

## 2021-01-01 RX ADMIN — Medication 10 ML: at 06:20

## 2021-01-01 RX ADMIN — Medication 10 ML: at 17:51

## 2021-01-01 RX ADMIN — METHYLPREDNISOLONE SODIUM SUCCINATE 60 MG: 40 INJECTION, POWDER, FOR SOLUTION INTRAMUSCULAR; INTRAVENOUS at 22:03

## 2021-01-01 RX ADMIN — INSULIN LISPRO 2 UNITS: 100 INJECTION, SOLUTION INTRAVENOUS; SUBCUTANEOUS at 13:18

## 2021-01-01 RX ADMIN — SODIUM CHLORIDE 40 MG: 9 INJECTION INTRAMUSCULAR; INTRAVENOUS; SUBCUTANEOUS at 09:14

## 2021-01-01 RX ADMIN — ACYCLOVIR 400 MG: 800 TABLET ORAL at 09:14

## 2021-01-01 RX ADMIN — LORAZEPAM 1 MG: 2 INJECTION INTRAMUSCULAR; INTRAVENOUS at 12:41

## 2021-01-01 RX ADMIN — FUROSEMIDE 20 MG: 10 INJECTION, SOLUTION INTRAMUSCULAR; INTRAVENOUS at 09:24

## 2021-01-01 RX ADMIN — Medication 10 ML: at 21:42

## 2021-01-01 RX ADMIN — ENOXAPARIN SODIUM 100 MG: 100 INJECTION SUBCUTANEOUS at 23:05

## 2021-01-01 RX ADMIN — ENOXAPARIN SODIUM 100 MG: 100 INJECTION SUBCUTANEOUS at 00:38

## 2021-01-01 RX ADMIN — ENOXAPARIN SODIUM 40 MG: 40 INJECTION SUBCUTANEOUS at 17:31

## 2021-01-01 RX ADMIN — FLUOXETINE 40 MG: 20 CAPSULE ORAL at 09:42

## 2021-01-01 RX ADMIN — EPINEPHRINE 1 MG: 0.1 INJECTION INTRACARDIAC; INTRAVENOUS at 00:41

## 2021-01-01 RX ADMIN — Medication 10 ML: at 12:36

## 2021-01-01 RX ADMIN — ENOXAPARIN SODIUM 100 MG: 100 INJECTION SUBCUTANEOUS at 09:57

## 2021-01-01 RX ADMIN — ACYCLOVIR 400 MG: 800 TABLET ORAL at 10:04

## 2021-01-01 RX ADMIN — ACYCLOVIR 400 MG: 800 TABLET ORAL at 09:03

## 2021-01-01 RX ADMIN — INSULIN GLARGINE 5 UNITS: 100 INJECTION, SOLUTION SUBCUTANEOUS at 10:16

## 2021-01-01 RX ADMIN — CEFEPIME HYDROCHLORIDE 2 G: 2 INJECTION, POWDER, FOR SOLUTION INTRAVENOUS at 00:00

## 2021-01-01 RX ADMIN — FERROUS SULFATE TAB 325 MG (65 MG ELEMENTAL FE) 325 MG: 325 (65 FE) TAB at 12:32

## 2021-01-01 RX ADMIN — Medication 10 ML: at 18:53

## 2021-01-01 RX ADMIN — Medication 10 ML: at 00:08

## 2021-01-01 RX ADMIN — METHYLPREDNISOLONE SODIUM SUCCINATE 60 MG: 40 INJECTION, POWDER, FOR SOLUTION INTRAMUSCULAR; INTRAVENOUS at 00:38

## 2021-01-01 RX ADMIN — FUROSEMIDE 20 MG: 10 INJECTION, SOLUTION INTRAMUSCULAR; INTRAVENOUS at 18:16

## 2021-01-01 RX ADMIN — TAMSULOSIN HYDROCHLORIDE 0.4 MG: 0.4 CAPSULE ORAL at 09:49

## 2021-01-01 RX ADMIN — ACYCLOVIR 400 MG: 800 TABLET ORAL at 09:52

## 2021-01-01 RX ADMIN — FERROUS SULFATE TAB 325 MG (65 MG ELEMENTAL FE) 325 MG: 325 (65 FE) TAB at 17:31

## 2021-01-01 RX ADMIN — ALBUTEROL SULFATE 2 PUFF: 90 AEROSOL, METERED RESPIRATORY (INHALATION) at 12:35

## 2021-01-01 RX ADMIN — IOPAMIDOL 80 ML: 755 INJECTION, SOLUTION INTRAVENOUS at 16:44

## 2021-01-01 RX ADMIN — FLUTICASONE PROPIONATE 2 SPRAY: 50 SPRAY, METERED NASAL at 21:45

## 2021-01-01 RX ADMIN — SODIUM CHLORIDE 40 MG: 9 INJECTION INTRAMUSCULAR; INTRAVENOUS; SUBCUTANEOUS at 21:18

## 2021-01-01 RX ADMIN — ALBUTEROL SULFATE 2 PUFF: 90 AEROSOL, METERED RESPIRATORY (INHALATION) at 08:00

## 2021-01-01 RX ADMIN — FLUOXETINE 40 MG: 20 CAPSULE ORAL at 09:24

## 2021-01-01 RX ADMIN — FERROUS SULFATE TAB 325 MG (65 MG ELEMENTAL FE) 325 MG: 325 (65 FE) TAB at 12:29

## 2021-01-01 RX ADMIN — FERROUS SULFATE TAB 325 MG (65 MG ELEMENTAL FE) 325 MG: 325 (65 FE) TAB at 10:04

## 2021-01-01 RX ADMIN — LORAZEPAM 1 MG: 2 INJECTION INTRAMUSCULAR; INTRAVENOUS at 10:18

## 2021-01-01 RX ADMIN — CEFEPIME HYDROCHLORIDE 2 G: 2 INJECTION, POWDER, FOR SOLUTION INTRAVENOUS at 09:08

## 2021-01-01 RX ADMIN — METHYLPREDNISOLONE SODIUM SUCCINATE 60 MG: 40 INJECTION, POWDER, FOR SOLUTION INTRAMUSCULAR; INTRAVENOUS at 18:16

## 2021-01-01 RX ADMIN — ENOXAPARIN SODIUM 40 MG: 40 INJECTION SUBCUTANEOUS at 17:57

## 2021-01-01 RX ADMIN — SODIUM CHLORIDE 40 MG: 9 INJECTION INTRAMUSCULAR; INTRAVENOUS; SUBCUTANEOUS at 10:17

## 2021-01-01 RX ADMIN — Medication 10 ML: at 21:16

## 2021-01-01 RX ADMIN — ENOXAPARIN SODIUM 100 MG: 100 INJECTION SUBCUTANEOUS at 23:11

## 2021-01-01 RX ADMIN — ACYCLOVIR 400 MG: 800 TABLET ORAL at 18:52

## 2021-01-01 RX ADMIN — METHYLPREDNISOLONE SODIUM SUCCINATE 60 MG: 40 INJECTION, POWDER, FOR SOLUTION INTRAMUSCULAR; INTRAVENOUS at 06:55

## 2021-01-01 RX ADMIN — INSULIN GLARGINE 5 UNITS: 100 INJECTION, SOLUTION SUBCUTANEOUS at 09:42

## 2021-01-01 RX ADMIN — INSULIN LISPRO 3 UNITS: 100 INJECTION, SOLUTION INTRAVENOUS; SUBCUTANEOUS at 17:54

## 2021-01-01 RX ADMIN — FOLIC ACID 1 MG: 1 TABLET ORAL at 09:30

## 2021-01-01 RX ADMIN — SODIUM BICARBONATE 100 MEQ: 84 INJECTION, SOLUTION INTRAVENOUS at 04:04

## 2021-01-01 RX ADMIN — FUROSEMIDE 20 MG: 10 INJECTION, SOLUTION INTRAMUSCULAR; INTRAVENOUS at 13:00

## 2021-01-01 RX ADMIN — Medication 10 ML: at 17:58

## 2021-01-01 RX ADMIN — TRANEXAMIC ACID 1000 MG: 1 INJECTION, SOLUTION INTRAVENOUS at 03:40

## 2021-01-01 RX ADMIN — ENOXAPARIN SODIUM 100 MG: 100 INJECTION SUBCUTANEOUS at 12:05

## 2021-01-01 RX ADMIN — METHYLPREDNISOLONE SODIUM SUCCINATE 40 MG: 40 INJECTION, POWDER, FOR SOLUTION INTRAMUSCULAR; INTRAVENOUS at 09:06

## 2021-01-01 RX ADMIN — Medication 10 ML: at 05:03

## 2021-01-01 RX ADMIN — METHYLPREDNISOLONE SODIUM SUCCINATE 60 MG: 40 INJECTION, POWDER, FOR SOLUTION INTRAMUSCULAR; INTRAVENOUS at 06:40

## 2021-01-01 RX ADMIN — Medication 10 ML: at 14:48

## 2021-01-01 RX ADMIN — FUROSEMIDE 40 MG: 10 INJECTION, SOLUTION INTRAVENOUS at 17:40

## 2021-01-01 RX ADMIN — EPINEPHRINE 1 MG: 0.1 INJECTION INTRACARDIAC; INTRAVENOUS at 00:31

## 2021-01-01 RX ADMIN — PIPERACILLIN AND TAZOBACTAM 3.38 G: 3; .375 INJECTION, POWDER, FOR SOLUTION INTRAVENOUS at 17:18

## 2021-01-01 RX ADMIN — ACYCLOVIR 400 MG: 800 TABLET ORAL at 17:57

## 2021-01-01 RX ADMIN — EPINEPHRINE 1 MG: 0.1 INJECTION INTRACARDIAC; INTRAVENOUS at 04:05

## 2021-01-01 RX ADMIN — FERROUS SULFATE TAB 325 MG (65 MG ELEMENTAL FE) 325 MG: 325 (65 FE) TAB at 18:33

## 2021-01-01 RX ADMIN — Medication 10 ML: at 23:08

## 2021-01-01 RX ADMIN — Medication 10 ML: at 20:47

## 2021-01-01 RX ADMIN — DEXAMETHASONE SODIUM PHOSPHATE 6 MG: 4 INJECTION, SOLUTION INTRA-ARTICULAR; INTRALESIONAL; INTRAMUSCULAR; INTRAVENOUS; SOFT TISSUE at 17:31

## 2021-01-01 RX ADMIN — METHYLPREDNISOLONE SODIUM SUCCINATE 40 MG: 40 INJECTION, POWDER, FOR SOLUTION INTRAMUSCULAR; INTRAVENOUS at 10:18

## 2021-01-01 RX ADMIN — ENOXAPARIN SODIUM 40 MG: 40 INJECTION SUBCUTANEOUS at 05:11

## 2021-01-01 RX ADMIN — REMDESIVIR 200 MG: 100 INJECTION, POWDER, LYOPHILIZED, FOR SOLUTION INTRAVENOUS at 10:49

## 2021-01-01 RX ADMIN — Medication 10 ML: at 14:00

## 2021-01-01 RX ADMIN — METHYLPREDNISOLONE SODIUM SUCCINATE 60 MG: 40 INJECTION, POWDER, FOR SOLUTION INTRAMUSCULAR; INTRAVENOUS at 09:54

## 2021-01-01 RX ADMIN — CEFEPIME HYDROCHLORIDE 2 G: 2 INJECTION, POWDER, FOR SOLUTION INTRAVENOUS at 10:40

## 2021-01-01 RX ADMIN — Medication: at 03:33

## 2021-01-01 RX ADMIN — INSULIN LISPRO 3 UNITS: 100 INJECTION, SOLUTION INTRAVENOUS; SUBCUTANEOUS at 23:05

## 2021-01-01 RX ADMIN — FERROUS SULFATE TAB 325 MG (65 MG ELEMENTAL FE) 325 MG: 325 (65 FE) TAB at 17:40

## 2021-01-01 RX ADMIN — METHYLPREDNISOLONE SODIUM SUCCINATE 60 MG: 40 INJECTION, POWDER, FOR SOLUTION INTRAMUSCULAR; INTRAVENOUS at 17:45

## 2021-01-01 RX ADMIN — ENOXAPARIN SODIUM 40 MG: 40 INJECTION SUBCUTANEOUS at 05:01

## 2021-01-01 RX ADMIN — TAMSULOSIN HYDROCHLORIDE 0.4 MG: 0.4 CAPSULE ORAL at 09:03

## 2021-01-01 RX ADMIN — FERROUS SULFATE TAB 325 MG (65 MG ELEMENTAL FE) 325 MG: 325 (65 FE) TAB at 17:09

## 2021-01-01 RX ADMIN — Medication 10 ML: at 00:07

## 2021-01-01 RX ADMIN — ALBUTEROL SULFATE 2 PUFF: 90 AEROSOL, METERED RESPIRATORY (INHALATION) at 08:53

## 2021-01-01 RX ADMIN — ALBUTEROL SULFATE 2 PUFF: 90 AEROSOL, METERED RESPIRATORY (INHALATION) at 19:45

## 2021-01-01 RX ADMIN — SODIUM CHLORIDE, POTASSIUM CHLORIDE, SODIUM LACTATE AND CALCIUM CHLORIDE 1000 ML: 600; 310; 30; 20 INJECTION, SOLUTION INTRAVENOUS at 04:21

## 2021-01-01 RX ADMIN — LORAZEPAM 1 MG: 2 INJECTION INTRAMUSCULAR; INTRAVENOUS at 22:30

## 2021-01-01 RX ADMIN — METHYLPREDNISOLONE SODIUM SUCCINATE 60 MG: 40 INJECTION, POWDER, FOR SOLUTION INTRAMUSCULAR; INTRAVENOUS at 07:22

## 2021-01-01 RX ADMIN — TAMSULOSIN HYDROCHLORIDE 0.4 MG: 0.4 CAPSULE ORAL at 09:15

## 2021-01-01 RX ADMIN — AMIODARONE HYDROCHLORIDE 300 MG: 50 INJECTION, SOLUTION INTRAVENOUS at 00:38

## 2021-01-01 RX ADMIN — ALBUTEROL SULFATE 2 PUFF: 90 AEROSOL, METERED RESPIRATORY (INHALATION) at 19:42

## 2021-01-01 RX ADMIN — TAMSULOSIN HYDROCHLORIDE 0.4 MG: 0.4 CAPSULE ORAL at 10:11

## 2021-01-01 RX ADMIN — FERROUS SULFATE TAB 325 MG (65 MG ELEMENTAL FE) 325 MG: 325 (65 FE) TAB at 09:03

## 2021-01-01 RX ADMIN — FUROSEMIDE 20 MG: 10 INJECTION, SOLUTION INTRAMUSCULAR; INTRAVENOUS at 09:56

## 2021-01-01 RX ADMIN — FOLIC ACID 1 MG: 1 TABLET ORAL at 08:54

## 2021-01-01 RX ADMIN — FERROUS SULFATE TAB 325 MG (65 MG ELEMENTAL FE) 325 MG: 325 (65 FE) TAB at 18:25

## 2021-01-01 RX ADMIN — METHYLPREDNISOLONE SODIUM SUCCINATE 60 MG: 40 INJECTION, POWDER, FOR SOLUTION INTRAMUSCULAR; INTRAVENOUS at 18:52

## 2021-01-01 RX ADMIN — POTASSIUM CHLORIDE 20 MEQ: 750 TABLET, EXTENDED RELEASE ORAL at 18:16

## 2021-01-01 RX ADMIN — FUROSEMIDE 40 MG: 10 INJECTION, SOLUTION INTRAVENOUS at 10:18

## 2021-01-01 RX ADMIN — Medication 10 ML: at 06:46

## 2021-01-01 RX ADMIN — ACYCLOVIR 400 MG: 800 TABLET ORAL at 17:45

## 2021-01-01 RX ADMIN — ALBUTEROL SULFATE 2 PUFF: 90 AEROSOL, METERED RESPIRATORY (INHALATION) at 19:50

## 2021-01-01 RX ADMIN — FUROSEMIDE 20 MG: 10 INJECTION, SOLUTION INTRAMUSCULAR; INTRAVENOUS at 10:17

## 2021-01-01 RX ADMIN — METHYLPREDNISOLONE SODIUM SUCCINATE 60 MG: 40 INJECTION, POWDER, FOR SOLUTION INTRAMUSCULAR; INTRAVENOUS at 00:07

## 2021-01-01 RX ADMIN — INSULIN LISPRO 3 UNITS: 100 INJECTION, SOLUTION INTRAVENOUS; SUBCUTANEOUS at 11:57

## 2021-01-01 RX ADMIN — INSULIN LISPRO 3 UNITS: 100 INJECTION, SOLUTION INTRAVENOUS; SUBCUTANEOUS at 16:54

## 2021-01-01 RX ADMIN — EPINEPHRINE 1 MG: 0.1 INJECTION INTRACARDIAC; INTRAVENOUS at 00:47

## 2021-01-01 RX ADMIN — Medication 10 ML: at 16:25

## 2021-01-01 RX ADMIN — Medication 10 ML: at 07:17

## 2021-01-01 RX ADMIN — ENOXAPARIN SODIUM 40 MG: 40 INJECTION SUBCUTANEOUS at 18:24

## 2021-01-01 RX ADMIN — FOLIC ACID 1 MG: 1 TABLET ORAL at 10:38

## 2021-01-01 RX ADMIN — FERROUS SULFATE TAB 325 MG (65 MG ELEMENTAL FE) 325 MG: 325 (65 FE) TAB at 11:57

## 2021-01-01 RX ADMIN — ACYCLOVIR 400 MG: 800 TABLET ORAL at 09:30

## 2021-01-01 RX ADMIN — CEFEPIME HYDROCHLORIDE 2 G: 2 INJECTION, POWDER, FOR SOLUTION INTRAVENOUS at 00:40

## 2021-01-01 RX ADMIN — FOLIC ACID 1 MG: 1 TABLET ORAL at 09:42

## 2021-01-01 RX ADMIN — INSULIN LISPRO 2 UNITS: 100 INJECTION, SOLUTION INTRAVENOUS; SUBCUTANEOUS at 10:11

## 2021-01-01 RX ADMIN — FOLIC ACID 1 MG: 1 TABLET ORAL at 09:24

## 2021-01-01 RX ADMIN — FUROSEMIDE 20 MG: 10 INJECTION, SOLUTION INTRAMUSCULAR; INTRAVENOUS at 09:30

## 2021-01-01 RX ADMIN — METHYLPREDNISOLONE SODIUM SUCCINATE 60 MG: 40 INJECTION, POWDER, FOR SOLUTION INTRAMUSCULAR; INTRAVENOUS at 13:25

## 2021-01-01 RX ADMIN — ONDANSETRON 4 MG: 2 INJECTION INTRAMUSCULAR; INTRAVENOUS at 13:25

## 2021-01-01 RX ADMIN — AMLODIPINE BESYLATE 5 MG: 5 TABLET ORAL at 09:50

## 2021-01-01 RX ADMIN — FUROSEMIDE 20 MG: 10 INJECTION, SOLUTION INTRAMUSCULAR; INTRAVENOUS at 12:32

## 2021-01-01 RX ADMIN — TAMSULOSIN HYDROCHLORIDE 0.4 MG: 0.4 CAPSULE ORAL at 09:24

## 2021-01-01 RX ADMIN — ALBUTEROL SULFATE 2 PUFF: 90 AEROSOL, METERED RESPIRATORY (INHALATION) at 07:35

## 2021-01-01 RX ADMIN — FLUTICASONE PROPIONATE 2 SPRAY: 50 SPRAY, METERED NASAL at 23:11

## 2021-01-01 RX ADMIN — FUROSEMIDE 40 MG: 10 INJECTION, SOLUTION INTRAVENOUS at 17:51

## 2021-01-01 RX ADMIN — SODIUM CHLORIDE 40 MG: 9 INJECTION INTRAMUSCULAR; INTRAVENOUS; SUBCUTANEOUS at 09:31

## 2021-01-01 RX ADMIN — SODIUM CHLORIDE, POTASSIUM CHLORIDE, SODIUM LACTATE AND CALCIUM CHLORIDE 1000 ML: 600; 310; 30; 20 INJECTION, SOLUTION INTRAVENOUS at 01:15

## 2021-01-01 RX ADMIN — ALBUTEROL SULFATE 2 PUFF: 90 AEROSOL, METERED RESPIRATORY (INHALATION) at 20:13

## 2021-01-01 RX ADMIN — Medication 10 ML: at 22:51

## 2021-01-01 RX ADMIN — FERROUS SULFATE TAB 325 MG (65 MG ELEMENTAL FE) 325 MG: 325 (65 FE) TAB at 10:10

## 2021-01-01 RX ADMIN — METHYLPREDNISOLONE SODIUM SUCCINATE 60 MG: 40 INJECTION, POWDER, FOR SOLUTION INTRAMUSCULAR; INTRAVENOUS at 05:59

## 2021-01-01 RX ADMIN — INSULIN LISPRO 4 UNITS: 100 INJECTION, SOLUTION INTRAVENOUS; SUBCUTANEOUS at 23:26

## 2021-01-01 RX ADMIN — INSULIN LISPRO 2 UNITS: 100 INJECTION, SOLUTION INTRAVENOUS; SUBCUTANEOUS at 22:25

## 2021-01-01 RX ADMIN — ENOXAPARIN SODIUM 100 MG: 100 INJECTION SUBCUTANEOUS at 00:40

## 2021-01-01 RX ADMIN — INSULIN LISPRO 2 UNITS: 100 INJECTION, SOLUTION INTRAVENOUS; SUBCUTANEOUS at 11:01

## 2021-01-01 RX ADMIN — ACYCLOVIR 400 MG: 800 TABLET ORAL at 08:53

## 2021-01-01 RX ADMIN — Medication 10 ML: at 21:29

## 2021-01-01 RX ADMIN — FLUTICASONE PROPIONATE 2 SPRAY: 50 SPRAY, METERED NASAL at 22:34

## 2021-01-01 RX ADMIN — CEFTRIAXONE SODIUM 2 G: 2 INJECTION, POWDER, FOR SOLUTION INTRAMUSCULAR; INTRAVENOUS at 08:52

## 2021-01-01 RX ADMIN — METHYLPREDNISOLONE SODIUM SUCCINATE 60 MG: 40 INJECTION, POWDER, FOR SOLUTION INTRAMUSCULAR; INTRAVENOUS at 05:20

## 2021-01-01 RX ADMIN — DEXAMETHASONE SODIUM PHOSPHATE 6 MG: 4 INJECTION, SOLUTION INTRA-ARTICULAR; INTRALESIONAL; INTRAMUSCULAR; INTRAVENOUS; SOFT TISSUE at 15:56

## 2021-01-01 RX ADMIN — ALBUTEROL SULFATE 2 PUFF: 90 AEROSOL, METERED RESPIRATORY (INHALATION) at 12:52

## 2021-01-01 RX ADMIN — ALBUMIN (HUMAN) 25 G: 12.5 INJECTION, SOLUTION INTRAVENOUS at 03:32

## 2021-01-01 RX ADMIN — Medication 10 ML: at 12:30

## 2021-01-01 RX ADMIN — Medication 10 ML: at 07:02

## 2021-01-01 RX ADMIN — METHYLPREDNISOLONE SODIUM SUCCINATE 60 MG: 40 INJECTION, POWDER, FOR SOLUTION INTRAMUSCULAR; INTRAVENOUS at 23:01

## 2021-01-01 RX ADMIN — SODIUM BICARBONATE 50 MEQ: 84 INJECTION, SOLUTION INTRAVENOUS at 00:42

## 2021-01-01 RX ADMIN — ALBUTEROL SULFATE 2 PUFF: 90 AEROSOL, METERED RESPIRATORY (INHALATION) at 20:40

## 2021-01-01 RX ADMIN — CEFEPIME HYDROCHLORIDE 2 G: 2 INJECTION, POWDER, FOR SOLUTION INTRAVENOUS at 17:51

## 2021-01-01 RX ADMIN — ACYCLOVIR 400 MG: 800 TABLET ORAL at 17:20

## 2021-01-01 RX ADMIN — TOCILIZUMAB 800 MG: 20 INJECTION, SOLUTION, CONCENTRATE INTRAVENOUS at 12:31

## 2021-01-01 RX ADMIN — FOLIC ACID 1 MG: 1 TABLET ORAL at 09:15

## 2021-01-01 RX ADMIN — FERROUS SULFATE TAB 325 MG (65 MG ELEMENTAL FE) 325 MG: 325 (65 FE) TAB at 12:28

## 2021-01-01 RX ADMIN — INSULIN LISPRO 3 UNITS: 100 INJECTION, SOLUTION INTRAVENOUS; SUBCUTANEOUS at 23:06

## 2021-01-01 RX ADMIN — INSULIN LISPRO 3 UNITS: 100 INJECTION, SOLUTION INTRAVENOUS; SUBCUTANEOUS at 00:27

## 2021-01-01 RX ADMIN — FERROUS SULFATE TAB 325 MG (65 MG ELEMENTAL FE) 325 MG: 325 (65 FE) TAB at 13:27

## 2021-01-01 RX ADMIN — ENOXAPARIN SODIUM 40 MG: 40 INJECTION SUBCUTANEOUS at 18:18

## 2021-01-01 RX ADMIN — METHYLPREDNISOLONE SODIUM SUCCINATE 60 MG: 40 INJECTION, POWDER, FOR SOLUTION INTRAMUSCULAR; INTRAVENOUS at 12:28

## 2021-01-01 RX ADMIN — METHYLPREDNISOLONE SODIUM SUCCINATE 40 MG: 40 INJECTION, POWDER, FOR SOLUTION INTRAMUSCULAR; INTRAVENOUS at 20:58

## 2021-01-01 RX ADMIN — FERROUS SULFATE TAB 325 MG (65 MG ELEMENTAL FE) 325 MG: 325 (65 FE) TAB at 10:44

## 2021-01-01 RX ADMIN — METHYLPREDNISOLONE SODIUM SUCCINATE 60 MG: 40 INJECTION, POWDER, FOR SOLUTION INTRAMUSCULAR; INTRAVENOUS at 10:01

## 2021-01-01 RX ADMIN — ACYCLOVIR 400 MG: 800 TABLET ORAL at 10:38

## 2021-01-01 RX ADMIN — INSULIN LISPRO 2 UNITS: 100 INJECTION, SOLUTION INTRAVENOUS; SUBCUTANEOUS at 16:41

## 2021-01-01 RX ADMIN — POTASSIUM CHLORIDE 10 MEQ: 10 INJECTION, SOLUTION INTRAVENOUS at 19:22

## 2021-01-01 RX ADMIN — CALCIUM GLUCONATE 2 G: 94 INJECTION, SOLUTION INTRAVENOUS at 02:53

## 2021-01-01 RX ADMIN — FLUOXETINE 40 MG: 20 CAPSULE ORAL at 10:04

## 2021-01-01 RX ADMIN — INSULIN LISPRO 2 UNITS: 100 INJECTION, SOLUTION INTRAVENOUS; SUBCUTANEOUS at 12:40

## 2021-01-01 RX ADMIN — FERROUS SULFATE TAB 325 MG (65 MG ELEMENTAL FE) 325 MG: 325 (65 FE) TAB at 17:20

## 2021-01-01 RX ADMIN — INSULIN LISPRO 5 UNITS: 100 INJECTION, SOLUTION INTRAVENOUS; SUBCUTANEOUS at 18:00

## 2021-01-01 RX ADMIN — FERROUS SULFATE TAB 325 MG (65 MG ELEMENTAL FE) 325 MG: 325 (65 FE) TAB at 18:52

## 2021-01-01 RX ADMIN — LORAZEPAM 1 MG: 2 INJECTION INTRAMUSCULAR; INTRAVENOUS at 14:59

## 2021-01-01 RX ADMIN — ENOXAPARIN SODIUM 100 MG: 100 INJECTION SUBCUTANEOUS at 23:53

## 2021-01-01 RX ADMIN — ENOXAPARIN SODIUM 100 MG: 100 INJECTION SUBCUTANEOUS at 00:12

## 2021-01-01 RX ADMIN — AMLODIPINE BESYLATE 5 MG: 5 TABLET ORAL at 10:38

## 2021-01-01 RX ADMIN — METHYLPREDNISOLONE SODIUM SUCCINATE 60 MG: 40 INJECTION, POWDER, FOR SOLUTION INTRAMUSCULAR; INTRAVENOUS at 17:19

## 2021-01-01 RX ADMIN — AMLODIPINE BESYLATE 5 MG: 5 TABLET ORAL at 10:11

## 2021-01-01 RX ADMIN — ACYCLOVIR 400 MG: 800 TABLET ORAL at 17:31

## 2021-01-01 RX ADMIN — FLUOXETINE 40 MG: 20 CAPSULE ORAL at 10:10

## 2021-01-01 RX ADMIN — ENOXAPARIN SODIUM 100 MG: 100 INJECTION SUBCUTANEOUS at 01:10

## 2021-01-01 RX ADMIN — Medication 10 ML: at 13:27

## 2021-01-01 RX ADMIN — ALBUTEROL SULFATE 2 PUFF: 90 AEROSOL, METERED RESPIRATORY (INHALATION) at 15:57

## 2021-01-01 RX ADMIN — FUROSEMIDE 20 MG: 10 INJECTION, SOLUTION INTRAMUSCULAR; INTRAVENOUS at 09:06

## 2021-01-01 RX ADMIN — ENOXAPARIN SODIUM 100 MG: 100 INJECTION SUBCUTANEOUS at 13:25

## 2021-01-01 RX ADMIN — ACYCLOVIR 400 MG: 800 TABLET ORAL at 09:24

## 2021-01-01 RX ADMIN — FERROUS SULFATE TAB 325 MG (65 MG ELEMENTAL FE) 325 MG: 325 (65 FE) TAB at 13:17

## 2021-01-01 RX ADMIN — TAMSULOSIN HYDROCHLORIDE 0.4 MG: 0.4 CAPSULE ORAL at 08:52

## 2021-01-01 RX ADMIN — CEFTRIAXONE SODIUM 1 G: 1 INJECTION, POWDER, FOR SOLUTION INTRAMUSCULAR; INTRAVENOUS at 09:13

## 2021-01-01 RX ADMIN — FUROSEMIDE 20 MG: 10 INJECTION, SOLUTION INTRAMUSCULAR; INTRAVENOUS at 18:52

## 2021-01-01 RX ADMIN — SODIUM BICARBONATE 100 MEQ: 84 INJECTION, SOLUTION INTRAVENOUS at 02:25

## 2021-01-01 RX ADMIN — FERROUS SULFATE TAB 325 MG (65 MG ELEMENTAL FE) 325 MG: 325 (65 FE) TAB at 09:42

## 2021-01-01 RX ADMIN — INSULIN LISPRO 3 UNITS: 100 INJECTION, SOLUTION INTRAVENOUS; SUBCUTANEOUS at 18:33

## 2021-01-01 RX ADMIN — EPINEPHRINE 1 MG: 0.1 INJECTION INTRACARDIAC; INTRAVENOUS at 00:34

## 2021-01-01 RX ADMIN — METHYLPREDNISOLONE SODIUM SUCCINATE 60 MG: 40 INJECTION, POWDER, FOR SOLUTION INTRAMUSCULAR; INTRAVENOUS at 22:50

## 2021-01-01 RX ADMIN — FERROUS SULFATE TAB 325 MG (65 MG ELEMENTAL FE) 325 MG: 325 (65 FE) TAB at 12:55

## 2021-01-01 RX ADMIN — ROCURONIUM BROMIDE 100 MG: 10 INJECTION INTRAVENOUS at 03:52

## 2021-01-01 RX ADMIN — ALBUTEROL SULFATE 2 PUFF: 90 AEROSOL, METERED RESPIRATORY (INHALATION) at 21:06

## 2021-01-01 RX ADMIN — AMLODIPINE BESYLATE 5 MG: 5 TABLET ORAL at 10:04

## 2021-01-01 RX ADMIN — DEXAMETHASONE SODIUM PHOSPHATE 6 MG: 4 INJECTION, SOLUTION INTRA-ARTICULAR; INTRALESIONAL; INTRAMUSCULAR; INTRAVENOUS; SOFT TISSUE at 16:43

## 2021-01-01 RX ADMIN — ALBUTEROL SULFATE 2 PUFF: 90 AEROSOL, METERED RESPIRATORY (INHALATION) at 13:26

## 2021-01-01 RX ADMIN — ENOXAPARIN SODIUM 100 MG: 100 INJECTION SUBCUTANEOUS at 00:07

## 2021-01-01 RX ADMIN — Medication 10 ML: at 05:20

## 2021-01-01 RX ADMIN — SODIUM BICARBONATE 100 MEQ: 84 INJECTION, SOLUTION INTRAVENOUS at 03:04

## 2021-01-01 RX ADMIN — TAMSULOSIN HYDROCHLORIDE 0.4 MG: 0.4 CAPSULE ORAL at 10:04

## 2021-01-01 RX ADMIN — ACYCLOVIR 400 MG: 800 TABLET ORAL at 18:25

## 2021-01-01 RX ADMIN — CEFEPIME HYDROCHLORIDE 2 G: 2 INJECTION, POWDER, FOR SOLUTION INTRAVENOUS at 16:54

## 2021-01-01 RX ADMIN — EPINEPHRINE 10 MCG/MIN: 1 INJECTION INTRAMUSCULAR; INTRAVENOUS; SUBCUTANEOUS at 00:50

## 2021-01-01 RX ADMIN — TAMSULOSIN HYDROCHLORIDE 0.4 MG: 0.4 CAPSULE ORAL at 09:59

## 2021-01-01 RX ADMIN — Medication 10 ML: at 22:04

## 2021-01-01 RX ADMIN — Medication 10 ML: at 06:41

## 2021-01-01 RX ADMIN — ENOXAPARIN SODIUM 100 MG: 100 INJECTION SUBCUTANEOUS at 13:24

## 2021-01-01 RX ADMIN — SODIUM CHLORIDE 40 MG: 9 INJECTION INTRAMUSCULAR; INTRAVENOUS; SUBCUTANEOUS at 08:52

## 2021-01-01 RX ADMIN — FOLIC ACID 1 MG: 1 TABLET ORAL at 09:59

## 2021-01-01 RX ADMIN — METHYLPREDNISOLONE SODIUM SUCCINATE 40 MG: 40 INJECTION, POWDER, FOR SOLUTION INTRAMUSCULAR; INTRAVENOUS at 21:42

## 2021-01-01 RX ADMIN — INSULIN LISPRO 7 UNITS: 100 INJECTION, SOLUTION INTRAVENOUS; SUBCUTANEOUS at 16:30

## 2021-01-01 RX ADMIN — ACYCLOVIR 400 MG: 800 TABLET ORAL at 18:55

## 2021-05-22 PROBLEM — U07.1 PNEUMONIA DUE TO COVID-19 VIRUS: Status: ACTIVE | Noted: 2021-01-01

## 2021-05-22 PROBLEM — J12.82 PNEUMONIA DUE TO COVID-19 VIRUS: Status: ACTIVE | Noted: 2021-01-01

## 2021-05-22 PROBLEM — J96.01 ACUTE HYPOXEMIC RESPIRATORY FAILURE (HCC): Status: ACTIVE | Noted: 2021-01-01

## 2021-05-22 NOTE — ACP (ADVANCE CARE PLANNING)
Advance Care Planning Advance Care Planning (ACP) Physician/NP/PA Conversation Date of Conversation: 5/22/2021 Conducted with: Patient with Decision Making Capacity and with his wife on the phone with patient's permission Healthcare Decision Maker:  
 
Click here to complete [address] including selection of the Healthcare Decision Maker Relationship (ie \"Primary\") Today we documented Decision Maker(s) consistent with Legal Next of Kin hierarchy. Care Preferences: Hospitalization: \"If your health worsens and it becomes clear that your chance of recovery is unlikely, what would be your preference regarding hospitalization? \" The patient would prefer hospitalization. Ventilation: \"If you were unable to breathe on your own and your chance of recovery was unlikely, what would be your preference about the use of a ventilator (breathing machine) if it was available to you? \" The patient would desire the use of a ventilator. Resuscitation: \"In the event your heart stopped as a result of an underlying serious health condition, would you want attempts to be made to restart your heart, or would you prefer a natural death? \" Yes, attempt to resuscitate. Additional topics discussed: treatment goals Conversation Outcomes / Follow-Up Plan:  
ACP complete - no further action today Reviewed DNR/DNI and patient elects Full Code (Attempt Resuscitation) Length of Voluntary ACP Conversation in minutes:  16 minutes Moshe Velasco MD

## 2021-05-22 NOTE — ED TRIAGE NOTES
Patient arrived via EMS from home. EMS got a call for rectal bleeding, at arrival patient was found to be in respiratory distress. Diagnosed with COVID 1 week ago. When EMS arrived patient was 76% room air, then placed on non re-breather and came up to 88%. Most recent BP with EMS was 138/72, tachycardia in the 110. Patient currently receiving treatment for Multiple myeloma at [facility].

## 2021-05-22 NOTE — ED PROVIDER NOTES
Date of Service:  5/22/2021    Patient:  Harvinder Otoole    Chief Complaint:  Respiratory Distress       HPI:  Harvinder Otoole is a 78 y.o.  male who presents for evaluation of respiratory distress and rectal bleeding. Patient states that he had his first shot of [employer] vaccine about 10 days ago. About a week ago he was diagnosed with COVID-19. Currently on doxycycline and steroids. Respiratory symptoms continue to worsen over the last week. This morning he noted that he had bright red blood in his stool and then a second time had an additional episode of what he described as a large amount of bright red stool. He has positional lightheadedness. No chest pain abdominal pain nausea vomiting diarrhea headaches or chills. Some continued body aches. Worsening shortness of breath as well. Patient does not take blood thinners. History of multiple GI bleeds in the past.           Past Medical History:   Diagnosis Date    Hypertension     Multiple myeloma ([facility])        Past Surgical History:   Procedure Laterality Date    HX CHOLECYSTECTOMY           History reviewed. No pertinent family history.     Social History     Socioeconomic History    Marital status:      Spouse name: Not on file    Number of children: Not on file    Years of education: Not on file    Highest education level: Not on file   Occupational History    Not on file   Tobacco Use    Smoking status: Former Smoker    Smokeless tobacco: Never Used   Substance and Sexual Activity    Alcohol use: Not Currently    Drug use: Not on file    Sexual activity: Not on file   Other Topics Concern    Not on file   Social History Narrative    Not on file     Social Determinants of Health     Financial Resource Strain:     Difficulty of Paying Living Expenses:    Food Insecurity:     Worried About [address] in the Last Year:     [address] in the Last Year:    Transportation Needs:     Lack of Transportation (Medical):  Lack of Transportation (Non-Medical):    Physical Activity:     Days of Exercise per Week:     Minutes of Exercise per Session:    Stress:     Feeling of Stress :    Social Connections:     Frequency of Communication with Friends and Family:     Frequency of Social Gatherings with Friends and Family:     Attends Islam Services:     Active Member of Clubs or Organizations:     Attends Club or Organization Meetings:     Marital Status:    Intimate Partner Violence:     Fear of Current or Ex-Partner:     Emotionally Abused:     Physically Abused:     Sexually Abused: ALLERGIES: Sulfa (sulfonamide antibiotics)    Review of Systems   Constitutional: Positive for fatigue. Negative for fever. HENT: Negative for hearing loss. Eyes: Negative for visual disturbance. Respiratory: Positive for chest tightness and shortness of breath. Cardiovascular: Negative for chest pain. Gastrointestinal: Positive for blood in stool. Negative for abdominal pain. Genitourinary: Negative for flank pain. Musculoskeletal: Negative for back pain. Skin: Negative for rash. Neurological: Negative for dizziness and light-headedness. Psychiatric/Behavioral: Negative for confusion. Vitals:    05/22/21 1349 05/22/21 1400   BP: (!) 155/75 (!) 142/73   Pulse: 78 70   Resp: 25 24   Temp: 99.6 °F (37.6 °C)    SpO2: 93% 97%            Physical Exam  Vitals and nursing note reviewed. Constitutional:       General: He is in acute distress. Appearance: Normal appearance. He is well-developed. He is not ill-appearing, toxic-appearing or diaphoretic. HENT:      Head: Normocephalic and atraumatic. Eyes:      Conjunctiva/sclera: Conjunctivae normal.   Neck:      Vascular: No JVD. Trachea: No tracheal deviation. Cardiovascular:      Rate and Rhythm: Normal rate and regular rhythm. Heart sounds: No murmur heard.      Pulmonary:      Effort: Pulmonary effort is normal. No respiratory distress. Breath sounds: Normal breath sounds. No wheezing. Comments: Increased rate  Abdominal:      General: Abdomen is flat. There is no distension. Palpations: Abdomen is soft. Tenderness: There is no abdominal tenderness. There is no right CVA tenderness, left CVA tenderness or guarding. Musculoskeletal:         General: No deformity. Cervical back: Neck supple. Skin:     General: Skin is warm and dry. Capillary Refill: Capillary refill takes less than 2 seconds. Findings: No rash. Neurological:      Mental Status: He is alert and oriented to person, place, and time. Psychiatric:         Mood and Affect: Mood normal.         Behavior: Behavior normal.          MDM  Number of Diagnoses or Management Options    ED Course as of May 22 1653   Sat May 22, 2021   1504 HGB: 13.5 [GG]   1508 On steroids     WBC(!): 15.1 [GG]   1520 IMMATURE GRANULOCYTES(!): 1 [GG]      ED Course User Index  [GG] Ruby Daniel DO     VITAL SIGNS:  Patient Vitals for the past 4 hrs:   Temp Pulse Resp BP SpO2   05/22/21 1455  67 22  98 %   05/22/21 1445  60 21 135/63 100 %   05/22/21 1415  67 22 (!) 157/72 94 %   05/22/21 1400  70 24 (!) 142/73 97 %   05/22/21 1349 99.6 °F (37.6 °C) 78 25 (!) 155/75 93 %         LABS:  Recent Results (from the past 6 hour(s))   SAMPLES BEING HELD    Collection Time: 05/22/21  1:53 PM   Result Value Ref Range    SAMPLES BEING HELD 1LAV, 1RED, 1BLU, 1PST     COMMENT        Add-on orders for these samples will be processed based on acceptable specimen integrity and analyte stability, which may vary by analyte.    CBC WITH AUTOMATED DIFF    Collection Time: 05/22/21  1:53 PM   Result Value Ref Range    WBC 15.1 (H) 4.1 - 11.1 K/uL    RBC 4.43 4.10 - 5.70 M/uL    HGB 13.5 12.1 - 17.0 g/dL    HCT 40.3 36.6 - 50.3 %    MCV 91.0 80.0 - 99.0 FL    MCH 30.5 26.0 - 34.0 PG    MCHC 33.5 30.0 - 36.5 g/dL    RDW 16.3 (H) 11.5 - 14.5 %    PLATELET 963 698 - 173 K/uL MPV 11.8 8.9 - 12.9 FL    NRBC 0.0 0  WBC    ABSOLUTE NRBC 0.00 0.00 - 0.01 K/uL    NEUTROPHILS 83 (H) 32 - 75 %    LYMPHOCYTES 5 (L) 12 - 49 %    MONOCYTES 11 5 - 13 %    EOSINOPHILS 0 0 - 7 %    BASOPHILS 0 0 - 1 %    IMMATURE GRANULOCYTES 1 (H) 0.0 - 0.5 %    ABS. NEUTROPHILS 12.5 (H) 1.8 - 8.0 K/UL    ABS. LYMPHOCYTES 0.8 0.8 - 3.5 K/UL    ABS. MONOCYTES 1.6 (H) 0.0 - 1.0 K/UL    ABS. EOSINOPHILS 0.0 0.0 - 0.4 K/UL    ABS. BASOPHILS 0.0 0.0 - 0.1 K/UL    ABS. IMM. GRANS. 0.1 (H) 0.00 - 0.04 K/UL    DF AUTOMATED     METABOLIC PANEL, COMPREHENSIVE    Collection Time: 05/22/21  1:53 PM   Result Value Ref Range    Sodium 138 136 - 145 mmol/L    Potassium 3.0 (L) 3.5 - 5.1 mmol/L    Chloride 104 97 - 108 mmol/L    CO2 24 21 - 32 mmol/L    Anion gap 10 5 - 15 mmol/L    Glucose 120 (H) 65 - 100 mg/dL    BUN 27 (H) 6 - 20 MG/DL    Creatinine 1.13 0.70 - 1.30 MG/DL    BUN/Creatinine ratio 24 (H) 12 - 20      GFR est AA >60 >60 ml/min/1.73m2    GFR est non-AA >60 >60 ml/min/1.73m2    Calcium 8.6 8.5 - 10.1 MG/DL    Bilirubin, total 1.5 (H) 0.2 - 1.0 MG/DL    ALT (SGPT) 46 12 - 78 U/L    AST (SGOT) 39 (H) 15 - 37 U/L    Alk. phosphatase 73 45 - 117 U/L    Protein, total 7.3 6.4 - 8.2 g/dL    Albumin 2.6 (L) 3.5 - 5.0 g/dL    Globulin 4.7 (H) 2.0 - 4.0 g/dL    A-G Ratio 0.6 (L) 1.1 - 2.2     LACTIC ACID    Collection Time: 05/22/21  1:55 PM   Result Value Ref Range    Lactic acid 2.4 (HH) 0.4 - 2.0 MMOL/L        IMAGING:  XR CHEST PORT   Final Result   Scattered interstitial and parenchymal opacities consistent with multifocal   pneumonia. Cardiomegaly            Medications During Visit:  Medications   piperacillin-tazobactam (ZOSYN) 3.375 g in 0.9% sodium chloride (MBP/ADV) 100 mL MBP (has no administration in time range)   levoFLOXacin (LEVAQUIN) 750 mg in D5W IVPB (has no administration in time range)         DECISION MAKING:  Andrea Scott is a 78 y.o. male who comes in as above.   Here, during my initial evaluation, patient is no longer hypoxic on facemask. Patient placed on high flow which is eventually weaned down slightly. Patient does have hypoxia and diffuse multifocal pneumonia most likely from his underlying Covid. IV antibiotics, fluids and admission. Patient stable. Agreeable to stay. Patient's hemoglobin stable. He is not tachycardic or hypotensive. IMPRESSION:  1. Hypoxia    2. COVID-19    3. Sepsis without acute organ dysfunction, due to unspecified organism ([address]    4. Hypokalemia        DISPOSITION:  Admitted      Total critical care time spent exclusive of procedures:  45 minutes      Procedures        Perfect Serve Consult for Admission  3:05 PM    ED Room Number: ER24/24  Patient Name and age:  Destiny Bryant 78 y.o.  male  Working Diagnosis:   1. Hypoxia    2. COVID-19    3. Sepsis without acute organ dysfunction, due to unspecified organism ([address]    4. Hypokalemia        COVID-19 Suspicion:  yes  Sepsis present:  no  Reassessment needed: no  Code Status:  Full Code  Readmission: no  Isolation Requirements:  Other  Recommended Level of Care:  step down  Department:[facility] Adult ED - 21   Other:  Reported large bright red bowel movement today. Called EMS - was 70% RA. Known covid x 1 week on steroids. Hg and vitals stable with exception of 02. Fine on high flow. Appears much better at this time. Xray shows multifocal PNA. Abx started. Lactate 2.4, fluids ordered as well. No bleeding at the moment.

## 2021-05-22 NOTE — ED NOTES
Called lab to check on the status of the add-on labs for pro calcitonin and c-reactive protein. Lab states they did not see it, but will add on.

## 2021-05-22 NOTE — H&P
HISTORY AND PHYSICAL  Rufinoneema Andujar MD        PCP: Nasim Le MD    Please note that this dictation was completed with [employer], the computer voice recognition software. Quite often unanticipated grammatical, syntax, homophones, and other interpretive errors are inadvertently transcribed by the computer software. Please disregard these errors. Please excuse any errors that have escaped final proofreading. CHIEF COMPLAINTS    Shortness of breath  Rectal bleeding    HISTORY OF PRESENT ILLNESS   This is a 42-year-old gentleman who is currently undergoing treatment for multiple myeloma was brought to the ED for rectal bleed and shortness of breath. Upon arrival of EMS at his home, patient was found to be in respiratory distress and hypoxic with SPO2 in the 70s and he was put on NRB and brought to the ED. In the ED hospital on high flow oxygen. Patient reported testing positive for Covid by home kit about a week ago, multiple family members positive for Covid as well. Patient was not good historian, history supplemented by speaking with his wife and daughter on the phone. His wife stated that they had [employer] shot on May 1. Gave history of rectal bleeding. No bleeding anywhere else. Patient has cough. Denied headache, nausea or vomiting. Denied chest pain. Work-up in the ED significant for WBC 15.1 with 83% neutrophilia, hemoglobin 13.5, potassium 3.0, lactic acid 2.4  Chest x-ray showed scattered interstitial and parenchymal opacities consistent with multifocal pneumonia. Cardiomegaly  Patient was started with levofloxacin and Zosyn and we are consulted for admission. PMH/PSH:  Past Medical History:   Diagnosis Date    Hypertension     Multiple myeloma ([address]      Past Surgical History:   Procedure Laterality Date    HX CHOLECYSTECTOMY         Home meds:   Prior to Admission medications    Medication Sig Start Date End Date Taking?  Authorizing Provider   FLUoxetine (PROzac) 40 mg capsule Take 40 mg by mouth daily. Yes Provider, Historical   acyclovir (ZOVIRAX) 400 mg tablet Take 400 mg by mouth two (2) times a day. Yes Provider, Historical   folic acid (FOLVITE) 1 mg tablet Take 1 mg by mouth daily. Yes Provider, Historical   amLODIPine (NORVASC) 10 mg tablet Take 10 mg by mouth daily. Yes Provider, Historical   lisinopril-hydroCHLOROthiazide (PRINZIDE, ZESTORETIC) 20-25 mg per tablet Take 1 Tablet by mouth daily. Yes Provider, Historical   ferrous sulfate 325 mg (65 mg iron) tablet Take  by mouth three (3) times daily (with meals). Yes Provider, Historical   tamsulosin (Flomax) 0.4 mg capsule Take 0.4 mg by mouth daily. Yes Provider, Historical   omeprazole (PRILOSEC) 40 mg capsule Take 40 mg by mouth daily. Yes Provider, Historical   pomalidomide 3 mg cap Take 3 mg by mouth daily. He takes 3 mg daily for 21 days with 1 week off. Indications: multiple myeloma   Yes Provider, Historical       Allergies: Allergies   Allergen Reactions    Sulfa (Sulfonamide Antibiotics) Itching       FH:  History reviewed. No pertinent family history. SH:  Social History     Tobacco Use    Smoking status: Former Smoker    Smokeless tobacco: Never Used   Substance Use Topics    Alcohol use: Not Currently       ROS: A comprehensive review of systems was negative except for that written in the HPI. PHYSICAL EXAM:  Visit Vitals  /63 (BP 1 Location: Right arm, BP Patient Position: At rest)   Pulse 67   Temp 99.6 °F (37.6 °C)   Resp 22   SpO2 98%       General:           On high flow oxygen, tachypneic. Able to speak in complete sentence    HEENT:           Atraumatic, anicteric sclerae, pink conjunctivae                          No oral ulcers, mucosa moist, throat clear, dentition fair  Neck:               Supple, symmetrical,  thyroid: non tender  Lungs: On high flow oxygen, tachypneic, able to speak in complete sentences.   Crepitations on both lungs posteriorly. Chest wall:      No tenderness  No Accessory muscle use. Heart:              Regular  rhythm,  No  murmur   No edema  Abdomen:        Soft, non-tender. Not distended. Bowel sounds normal  Extremities:     No cyanosis. No clubbing,                            Skin turgor normal, Capillary refill normal, Radial dial pulse 2+  Skin:                Not pale. Not Jaundiced  No rashes   Psych:             Not anxious or agitated. Neurologic:     Alert and oriented to PPT, CNII-XII intact. Motor and sensory exam grossly intact. Labs/Imaging:  Recent Results (from the past 24 hour(s))   SAMPLES BEING HELD    Collection Time: 05/22/21  1:53 PM   Result Value Ref Range    SAMPLES BEING HELD 1LAV, 1RED, 1BLU, 1PST     COMMENT        Add-on orders for these samples will be processed based on acceptable specimen integrity and analyte stability, which may vary by analyte. CBC WITH AUTOMATED DIFF    Collection Time: 05/22/21  1:53 PM   Result Value Ref Range    WBC 15.1 (H) 4.1 - 11.1 K/uL    RBC 4.43 4.10 - 5.70 M/uL    HGB 13.5 12.1 - 17.0 g/dL    HCT 40.3 36.6 - 50.3 %    MCV 91.0 80.0 - 99.0 FL    MCH 30.5 26.0 - 34.0 PG    MCHC 33.5 30.0 - 36.5 g/dL    RDW 16.3 (H) 11.5 - 14.5 %    PLATELET 517 561 - 277 K/uL    MPV 11.8 8.9 - 12.9 FL    NRBC 0.0 0  WBC    ABSOLUTE NRBC 0.00 0.00 - 0.01 K/uL    NEUTROPHILS 83 (H) 32 - 75 %    LYMPHOCYTES 5 (L) 12 - 49 %    MONOCYTES 11 5 - 13 %    EOSINOPHILS 0 0 - 7 %    BASOPHILS 0 0 - 1 %    IMMATURE GRANULOCYTES 1 (H) 0.0 - 0.5 %    ABS. NEUTROPHILS 12.5 (H) 1.8 - 8.0 K/UL    ABS. LYMPHOCYTES 0.8 0.8 - 3.5 K/UL    ABS. MONOCYTES 1.6 (H) 0.0 - 1.0 K/UL    ABS. EOSINOPHILS 0.0 0.0 - 0.4 K/UL    ABS. BASOPHILS 0.0 0.0 - 0.1 K/UL    ABS. IMM.  GRANS. 0.1 (H) 0.00 - 0.04 K/UL    DF AUTOMATED     METABOLIC PANEL, COMPREHENSIVE    Collection Time: 05/22/21  1:53 PM   Result Value Ref Range    Sodium 138 136 - 145 mmol/L    Potassium 3.0 (L) 3.5 - 5.1 mmol/L Chloride 104 97 - 108 mmol/L    CO2 24 21 - 32 mmol/L    Anion gap 10 5 - 15 mmol/L    Glucose 120 (H) 65 - 100 mg/dL    BUN 27 (H) 6 - 20 MG/DL    Creatinine 1.13 0.70 - 1.30 MG/DL    BUN/Creatinine ratio 24 (H) 12 - 20      GFR est AA >60 >60 ml/min/1.73m2    GFR est non-AA >60 >60 ml/min/1.73m2    Calcium 8.6 8.5 - 10.1 MG/DL    Bilirubin, total 1.5 (H) 0.2 - 1.0 MG/DL    ALT (SGPT) 46 12 - 78 U/L    AST (SGOT) 39 (H) 15 - 37 U/L    Alk. phosphatase 73 45 - 117 U/L    Protein, total 7.3 6.4 - 8.2 g/dL    Albumin 2.6 (L) 3.5 - 5.0 g/dL    Globulin 4.7 (H) 2.0 - 4.0 g/dL    A-G Ratio 0.6 (L) 1.1 - 2.2     MAGNESIUM    Collection Time: 05/22/21  1:53 PM   Result Value Ref Range    Magnesium 2.3 1.6 - 2.4 mg/dL   LACTIC ACID    Collection Time: 05/22/21  1:55 PM   Result Value Ref Range    Lactic acid 2.4 (HH) 0.4 - 2.0 MMOL/L       Recent Labs     05/22/21  1353   WBC 15.1*   HGB 13.5   HCT 40.3        Recent Labs     05/22/21  1353      K 3.0*      CO2 24   BUN 27*   CREA 1.13   *   CA 8.6   MG 2.3     Recent Labs     05/22/21  1353   ALT 46   AP 73   TBILI 1.5*   TP 7.3   ALB 2.6*   GLOB 4.7*       No results for input(s): CPK, CKNDX, TROIQ in the last 72 hours. No lab exists for component: CPKMB    No results for input(s): INR, PTP, APTT, INREXT in the last 72 hours. No results for input(s): PH, PCO2, PO2 in the last 72 hours. XR CHEST PORT  Narrative: Clinical history: covid SOB  INDICATION:   covid SOB  COMPARISON: None    FINDINGS:  AP portable upright view of the chest demonstrates an enlarged cardiopericardial  silhouette. There is no pleural effusion. .There is no focal  consolidation. .Interstitial and parenchymal opacities. . Patient is on a cardiac  monitor. Impression: Scattered interstitial and parenchymal opacities consistent with multifocal  pneumonia.     Cardiomegaly          Assessment & Plan:  Acute hypoxic respiratory failure  Bilateral pneumonia likely Covid19 pneumonia, patient reported testing positive for Covid by home kit a week ago  Severe sepsis (presented with hypoxic respiratory failure, lactic acidosis, leukocytosis) secondary to bilateral pneumonia.  -Admit to IMCU  -Oxygen support via high flow nasal cannula. -Start dexamethasone. Lovenox for Covid protocol. Continue empiric antibiotics started in the ED, Zosyn. Discontinue levofloxacin, patient is on Prozac and to decrease risk of QT prolongation. Check prolactin level  -Rapid Covid test  -Droplet plus and need for isolation  -We will discuss other Covid specific treatment such as Remdesevir once the Covid test is back. -Monitor inflammatory and procoagulant factors  -If inflammatory markers are significantly elevated, may consult pulmonary for consideration of Actemra    Chronic intermittent rectal bleeding, he had an episode this morning. Per his wife he had chronic intermittent rectal bleed related to his cancer and chemotherapy. Rectal bleed improved with recent switch of his chemotherapy Pomalyst per his wife. -IV Protonix  -Monitor H&H. Hemoglobin is stable, rectal bleed seem to be significant at this point, given high risk of VTE and complications in the setting of Covid we will continue with prophylactic anticoagulation while closely watching for recurrence of rectal bleeding. If rectal bleeding recurs and/or hemoglobin starts trending down, we may have to stop prophylactic anticoagulation.  -We will consider GI evaluation if rectal bleeding recurs and/or hemoglobin drops    Hypokalemia: Replace and monitor    History of multiple myeloma: On Pomalyst 3 mg daily for 21 days with 7 days off. Patient septic this will hold off Pomalyst and consult oncology for their opinion. Patient usually [facility] at [facility]  Hypertension, hold antihypertensives for now. Continue PTA acyclovir, folic acid, Prozac and tamsulosin.     PT medication list obtained from family on the phone    Patient's Baseline: Ambulates with walking  DVT ppx: Lovenox per Covid protocol  Code status: Patient made made it clear that he would want all aggressive measures, resuscitative efforts including intubation mechanical ventilation, CPR, shock and medications if needed. Disposition: TBD    I have discussed plan of care with his wife and daughter on the phone.         Signed By: KARON GONZALEZ MD     May 22, 2021

## 2021-05-22 NOTE — PROGRESS NOTES
Admission Medication Reconciliation: 
 
Information obtained from:  [employer] ([address] [place] [address]) RxQuery data available¹:  NO 
 
Comments/Recommendations: Updated PTA meds/reviewed patient's allergies. 1)  unable to speak with patient, family member provided med history to [facility] 2)  Medication changes (since last review): Added 
- n/a Adjusted 
- n/a Removed 
- n/a 
 
3)  [facility] did not have prescription record ofamlodipine, lisinopril-hydrochlorothiazide nor omeprazole. 
(pomalidomide would be filled by a speciality pharmacy) ¹[employer] pharmacy benefit data reflects medications filled and processed through the patient's insurance, however  
this data does NOT capture whether the medication was picked up or is currently being taken by the patient. Allergies:  Sulfa (sulfonamide antibiotics) Significant PMH/Disease States:  
Past Medical History:  
Diagnosis Date Hypertension Multiple myeloma ([facility] Chief Complaint for this Admission: Chief Complaint Patient presents with Respiratory Distress Prior to Admission Medications:  
Prior to Admission Medications Prescriptions Last Dose Informant Taking? FLUoxetine (PROzac) 40 mg capsule   Yes Sig: Take 40 mg by mouth daily. acyclovir (ZOVIRAX) 400 mg tablet   Yes Sig: Take 400 mg by mouth two (2) times a day. amLODIPine (NORVASC) 10 mg tablet Sig: Take 10 mg by mouth daily. ferrous sulfate 325 mg (65 mg iron) tablet   Yes Sig: Take 325 mg by mouth three (3) times daily (with meals). folic acid (FOLVITE) 1 mg tablet   Yes Sig: Take 1 mg by mouth daily. lisinopril-hydroCHLOROthiazide (PRINZIDE, ZESTORETIC) 20-25 mg per tablet Sig: Take 1 Tablet by mouth daily. omeprazole (PRILOSEC) 40 mg capsule Sig: Take 40 mg by mouth daily. pomalidomide 3 mg cap   Yes Sig: Take 3 mg by mouth daily. He takes 3 mg daily for 21 days with 1 week off.    Indications: multiple myeloma  
tamsulosin (Flomax) 0.4 mg capsule   Yes Sig: Take 0.4 mg by mouth daily. Facility-Administered Medications: None Please contact the main inpatient pharmacy with any questions or concerns at [phone] and we will direct you to the clinical pharmacist covering this patient's care while in-house.   
Cole Roach [facility]

## 2021-05-22 NOTE — PROGRESS NOTES
Talked with his wife on the phone and made her aware that his Covid test has come back positive. She said they are tested that multiple family members got sick with Covid recently. Regarding rectal bleeding, she said he has been dealing with this ever since his diagnosed and treated for multiple myeloma but with recent change of his cancer medicine to Pomalyst the rectal bleed has improved a lot. He had an episode earlier today but no recurrence since. Made her aware that we give him  blood thinner, Lovenox to prevent blood clots as he is at high risk for blood clots due to the Covid infection that we may have to stop the Lovenox if he experiences recurrence of rectal bleeding.

## 2021-05-22 NOTE — ED NOTES
Daughter Brenda Estrella - [phone].   Pt has given permission for daughter to be updated on all medical.

## 2021-05-23 NOTE — ROUTINE PROCESS
TRANSFER - OUT REPORT: 
 
Verbal report given to DB Willingham(name) on Jose Hernandez  being transferred to [facility](unit) for routine progression of care Report consisted of patients Situation, Background, Assessment and  
Recommendations(SBAR). Information from the following report(s) SBAR, ED Summary, [facility] and Recent Results was reviewed with the receiving nurse. Lines:  
Peripheral IV 05/22/21 Right Hand (Active) Site Assessment Clean, dry, & intact 05/22/21 1351 Phlebitis Assessment 0 05/22/21 1351 Infiltration Assessment 0 05/22/21 1351 Dressing Status Clean, dry, & intact 05/22/21 1351 Dressing Type Transparent 05/22/21 1351 Hub Color/Line Status Green 05/22/21 1351 Peripheral IV 05/22/21 Left Antecubital (Active) Site Assessment Clean, dry, & intact 05/22/21 1352 Phlebitis Assessment 0 05/22/21 1352 Infiltration Assessment 0 05/22/21 1352 Dressing Status Clean, dry, & intact 05/22/21 1352 Dressing Type Transparent 05/22/21 1352 Hub Color/Line Status Green 05/22/21 1352 Opportunity for questions and clarification was provided. Patient transported with: 
 Registered Nurse and respiratory.

## 2021-05-23 NOTE — CONSULTS
[address] at [place] [address] 
[phone] Consult noted in computer. Chart reviewed. Patient with a history of myeloma, apparently managed at [facility] and currently on therapy with pomalyst.  Now admitted with COVID19 pneumonia/sepsis. I recommend holding his pomalyst for now. Tomorrow (Monday) our office will work on requesting records from [facility] and will then provide a formal consult. Proceed with COVID19 management per protocol. Please note that patient is likely immune suppressed from his myeloma and associated therapy.

## 2021-05-23 NOTE — PROGRESS NOTES
Clinical Pharmacy Note: Ceftriaxone Dosing    Please note that the ceftriaxone dose for Dung Chu has been changed to 2 mg IV q24h per [facility]-approved protocol. Please contact the pharmacy with any questions.     Bay Milner, DUANED, BCPS

## 2021-05-23 NOTE — PROGRESS NOTES
Day #1 of zosyn Indication:  pneumonia Current regimen:  3.375gm q8h Abx regimen:   
Recent Labs  
  21 
0222 21 
1353 WBC 15.5* 15.1*  
CREA  --  1.13  
BUN  --  27* Est CrCl: ~70 ml/min Temp (24hrs), Av.8 °F (37.1 °C), Min:97.6 °F (36.4 °C), Max:99.7 °F (37.6 °C) Cultures: pending Plan: Continue current regimen **close i-vent after initial review. Remove this text prior to posting to note.

## 2021-05-23 NOTE — PROGRESS NOTES
[address] [facility] Adult  Hospitalist Group Hospitalist Progress Note Belen Thomas MD 
Answering service: [phone] OR [phone] from in house phone Date of Service:  2021 NAME:  Marquis Posada :  1945 MRN:  [id number] This documentation was facilitated by a Voice Recognition software and may contain inadvertent typographical errors. Admission Summary: This is a 82-year-old gentleman who is currently undergoing treatment for multiple myeloma was brought to the ED for rectal bleed and shortness of breath. Upon arrival of EMS at his home, patient was found to be in respiratory distress and hypoxic with SPO2 in the 70s and he was put on NRB and brought to the ED. In the ED hospital on high flow oxygen. Interval history / Subjective: RRT for hypoxia,patiwent on HF 60 l/min,100%. Sitting by the edge of bed,sob,but able to speak in complete sentence. A&OX4. He is being placed on BiPAP. He reaffirmed that he would want everything done including ventilation and mechanical ventilation if needed. Discussed with Intensivist for potential tranfers for higher level of care Discussed with patient about [place] agreed saying he would like to try anything that may help. Pharmacy consulted to see if he qualifies. Assessment & Plan:  
Acute hypoxic respiratory failure Bilateral pneumonia likely Covid19 pneumonia Severe sepsis (presented with hypoxic respiratory failure, lactic acidosis, leukocytosis) secondary to bilateral pneumonia. -RRT for hypoxia, maxed on HF and now being placed on BiPAP. May need higher level of care as he is progressing fast,discussed with intensivist. 
-On dexamethasone. Lovenox for Covid protocol(no rectal bleeding since admission). Continue empiric antibiotics. Remdesivir if qualifies. Intensiovist ordering Actimera.  
-Droplet plus and need for isolation -Monitor inflammatory and procoagulant factors Chronic intermittent rectal bleeding, he had an episode this morning. Per his wife he had chronic intermittent rectal bleed related to his cancer and chemotherapy. Rectal bleed improved with recent switch of his chemotherapy Pomalyst per his wife. -IV Protonix 
-No rectal bleeding since admission. Hemoglobin slightly down which could be due to hemodilution as he has received fluid in the ED is stable. Given high risk of VTE and complications in the setting of Covid ,reasonable to continue with prophylactic anticoagulation while closely watching for recurrence of rectal bleeding. If rectal bleeding recurs and/or hemoglobin drops significantly, we may have to stop prophylactic anticoagulation. Hypokalemia: Replace and monitor History of multiple myeloma: On Pomalyst 3 mg daily for 21 days with 7 days off. Patient septic this will hold off Pomalyst and consult oncology for their opinion. Patient usually follows at [facility] Hypertension, hold antihypertensives for now. Continue PTA acyclovir, folic acid, Prozac and tamsulosin. PT medication list obtained from family on the phone Patient's Baseline: Ambulates with walking DVT ppx: Lovenox per Covid protocol Code status:He reaffirmed he would like to remain full code. Disposition: TBD 
5/23: Called and update his wife of the events that his now is on BiPAP,he may be transferred to ICU. If the BIPAP fails,he will have to be intubated. Carlos Enriquez. Phone [phone] Anticipated Discharge: Greater than 48 hours Hospital Problems  Never Reviewed Codes Class Noted POA Acute hypoxemic respiratory failure ([address] ICD-10-CM: J96.01 
ICD-9-CM: 518.81  5/22/2021 Unknown Pneumonia due to COVID-19 virus ICD-10-CM: U07.1, J12.82 ICD-9-CM: 480.8, 079.89  5/22/2021 Unknown Review of Systems: A comprehensive review of systems was negative except for that written in the HPI. Vital Signs:  
 Last 24hrs VS reviewed since prior progress note. Most recent are: 
Visit Vitals /68 Pulse (!) 55 Temp 98.6 °F (37 °C) Resp 18 Ht 5' 11\" (1.803 m) Wt 101.9 kg (224 lb 11.2 oz) SpO2 (!) 87% BMI 31.34 kg/m² Intake/Output Summary (Last 24 hours) at 5/23/2021 0943 Last data filed at 5/23/2021 0822 Gross per 24 hour Intake 1220 ml Output 425 ml Net 795 ml Physical Examination:  
 
I had a face to face encounter with this patient and independently examined them on5/23/2021 as outlined below: 
     
Constitutional:  Tachypnea, on oxygen via HF HEENT:  Atraumatic. Oral mucosa moist,. Non icteric sclera. No pallor. Resp:  Tachypneic. On HFNC. Bilateral crepitations Chest Wall: No deformity CV:  Regular rhythm, normal rate, no murmurs, gallops, rubs GI:  Soft, non distended, non tender. normoactive bowel sounds, no hepatosplenomegaly :  No CVA or suprapubic tenderness Musculoskeletal:  No edema, warm, 2+ pulses throughout Neurologic:  Mental status:AAOx4. Cranial nerves II-XII : WNL Motor exam:Moves all extremities symmetrically Data Review:  
 Review and/or order of clinical lab test 
Review and/or order of tests in the radiology section of CPT Review and/or order of tests in the medicine section of CPT Labs:  
 
Recent Labs  
  05/23/21 0222 05/22/21 
1830 05/22/21 
1353 WBC 15.5*  --  15.1* HGB 11.9* 11.8* 13.5 HCT 35.7* 35.0* 40.3   --  385 Recent Labs  
  05/23/21 0222 05/22/21 
1353  138  
K 3.8 3.0*  
 104 CO2 22 24 BUN 25* 27* CREA 0.97 1.13  
* 120* CA 7.7* 8.6 MG 2.3 2.3 Recent Labs  
  05/23/21 0222 05/22/21 
1353 ALT 43 46 AP 65 73 TBILI 0.7 1.5* TP 5.8* 7.3 ALB 2.2* 2.6*  
GLOB 3.6 4.7* Recent Labs  
  05/23/21 
0222 INR 1.1 PTP 11.6* No results for input(s): FE, TIBC, PSAT, FERR in the last 72 hours.   
No results found for: FOL, RBCF  
No results for input(s): PH, PCO2, PO2 in the last 72 hours. No results for input(s): CPK, CKNDX, TROIQ in the last 72 hours. No lab exists for component: CPKMB No results found for: CHOL, CHOLX, CHLST, CHOLV, HDL, HDLP, LDL, LDLC, DLDLP, TGLX, TRIGL, TRIGP, CHHD, CHHDX No results found for: [place] Sheer No results found for: COLOR, APPRN, SPGRU, REFSG, BRYANNA, PROTU, GLUCU, KETU, BILU, UROU, DANNY, LEUKU, GLUKE, EPSU, BACTU, WBCU, RBCU, CASTS, UCRY Medications Reviewed:  
 
Current Facility-Administered Medications Medication Dose Route Frequency  cefTRIAXone (ROCEPHIN) 2 g in 0.9% sodium chloride (MBP/ADV) 50 mL MBP  2 g IntraVENous Q24H  
 sodium chloride (NS) flush 5-40 mL  5-40 mL IntraVENous Q8H  
 sodium chloride (NS) flush 5-40 mL  5-40 mL IntraVENous PRN  
 sodium chloride (NS) flush 5-40 mL  5-40 mL IntraVENous Q8H  
 sodium chloride (NS) flush 5-40 mL  5-40 mL IntraVENous PRN  
 acetaminophen (TYLENOL) tablet 650 mg  650 mg Oral Q6H PRN Or  
 acetaminophen (TYLENOL) suppository 650 mg  650 mg Rectal Q6H PRN  polyethylene glycol (MIRALAX) packet 17 g  17 g Oral DAILY PRN  promethazine (PHENERGAN) tablet 12.5 mg  12.5 mg Oral Q6H PRN Or  
 ondansetron (ZOFRAN) injection 4 mg  4 mg IntraVENous Q6H PRN  
 dexamethasone (DECADRON) 4 mg/mL injection 6 mg  6 mg IntraVENous Q24H  
 acyclovir (ZOVIRAX) tablet 400 mg  400 mg Oral BID  ferrous sulfate tablet 325 mg  1 Tablet Oral TID WITH MEALS  
 FLUoxetine (PROzac) capsule 40 mg  40 mg Oral DAILY  folic acid (FOLVITE) tablet 1 mg  1 mg Oral DAILY  tamsulosin (FLOMAX) capsule 0.4 mg  0.4 mg Oral DAILY  pantoprazole (PROTONIX) 40 mg in 0.9% sodium chloride 10 mL injection  40 mg IntraVENous Q12H  
 enoxaparin (LOVENOX) injection 40 mg  40 mg SubCUTAneous Q12H  
 
______________________________________________________________________ EXPECTED LENGTH OF STAY: - - - 
ACTUAL LENGTH OF STAY:          1 
 
 Alannah Moran MD

## 2021-05-23 NOTE — PROGRESS NOTES
Notified by tele that HR dropped into 30s briefly and remains in the 40s. Assessed patient and no change in how the patient feels and did not complain of any dizziness or nausea, states \"just feel bad\". Provider notified via perfect serve.

## 2021-05-23 NOTE — PROGRESS NOTES
SITUATION: 
Code Status: Full Code Reason for Admission: Acute hypoxemic respiratory failure ([facility] [J96.01] Pneumonia due to COVID-19 virus [U07.1, J12.82] Hospital day: 1 Problem List:  
   
Hospital Problems  Never Reviewed Codes Class Noted POA Acute hypoxemic respiratory failure ([facility] ICD-10-CM: J96.01 
ICD-9-CM: 518.81  5/22/2021 Unknown Pneumonia due to COVID-19 virus ICD-10-CM: U07.1, J12.82 ICD-9-CM: 480.8, 079.89  5/22/2021 Unknown BACKGROUND: 
 Past Medical History:  
Past Medical History:  
Diagnosis Date  Hypertension  Multiple myeloma ([facility] Patient taking anticoagulants yes Patient has a defibrillator: no  
 History of shots YES Covid Vaccine 05/01/21 Isolation History YES COVID + ASSESSMENT: 
Changes in Assessment Throughout Shift: lactic acid elevated, tachypnea and dyspnea has progressively gotten worse, maxed out on HFNC settings, desatting to mid 80s with slow recovery, have attempted to prone patient but he will not stay on his belly long. Fluid bolus given overnight and zosyn started. No bloody stools overnight. Patient has Central Line: no 
Patient has Wetzel Cath: no  
 
 
Last Vitals: 
Vitals w/ MEWS Score (last day) Date/Time MEWS Score Pulse Resp Temp BP Level of Consciousness SpO2  
 05/23/21 0530    (!) 50  20        97 % 05/23/21 0500    (!) 48  22    128/60    (!) 80 % 05/23/21 0430    (!) 46  28        91 % 05/23/21 0400  3  (!) 55  (!) 33  98.6 °F (37 °C)  128/68  Alert (0)  97 % 05/23/21 0300    (!) 48  26    123/63    97 % 05/23/21 0200    (!) 54  25    (!) 110/55    91 % 05/23/21 0110    (!) 45  28        99 % 05/23/21 0107    (!) 49  28        98 % 05/23/21 0106    (!) 42  28        99 % 05/23/21 0100    (!) 49  29    126/64    98 % 05/23/21 0000  3  (!) 53  30  97.6 °F (36.4 °C)  (!) 130/58  Alert (0)  98 %  05/22/21 2300    (!) 53  27    117/60    97 % 05/22/21 2200    64  22    118/64    92 % 05/22/21 2100    65  28        91 % 05/22/21 2042  3  (!) 58  30  98.4 °F (36.9 °C)  121/68  Alert (0)  92 % 05/22/21 2020              95 % 05/22/21 2004    60  21    (!) 125/52  Alert (0)  97 % 05/22/21 1930    63  27    107/76    96 % 05/22/21 1900    75  21    138/65  Alert (0)  94 % 05/22/21 1830  2  64  22  99.7 °F (37.6 °C)  132/80  Alert (0)  96 % 05/22/21 1715    (!) 57  18    139/74    96 % 05/22/21 1707              96 % 05/22/21 1615    63  26    138/60    97 % 05/22/21 1545    64  18    122/62    94 % 05/22/21 1455    67  22        98 % 05/22/21 1445    60  21    135/63  Alert (0)  100 % 05/22/21 1415    67  22    (!) 157/72    94 % 05/22/21 1400    70  24    (!) 142/73    97 % 05/22/21 1349  2  78  25  99.6 °F (37.6 °C)  (!) 155/75  Alert (0)  93 % PAIN Pain Assessment Pain Intensity 1: 0 (05/23/21 0000) Patient Stated Pain Goal: 0 I Last 3 Weights: 
Last 3 Recorded Weights in this Encounter 05/22/21 1455 05/22/21 2042 Weight: 109.3 kg (241 lb) 101.9 kg (224 lb 11.2 oz) Weight change: INTAKE/OUPUT Current Shift: 05/22 1901 - 05/23 0700 In: 7099 [P.O.:120; I.V.:1100] Out: 425 [Urine:425] Last three shifts: No intake/output data recorded. Pending tests/procedures: Serial lactic acid and HH being drawn. Isolation precautions followed, supplies available outside room, sign posted Mauri Golden RN

## 2021-05-23 NOTE — PROGRESS NOTES
05/23/21 7647 Oxygen Therapy O2 Sat (%) 94 % Pulse via Oximetry 49 beats per minute O2 Device BIPAP  
FIO2 (%) 100 % Pre-Treatment Breathing Pattern Labored;Grunting; Tachypneic Respirations 27 Patient placed on Bipap per MD order.

## 2021-05-23 NOTE — ACP (ADVANCE CARE PLANNING)
Advance Care Planning Advance Care Planning Inpatient Note [facility] Spiritual Care Department Today's Date: 5/23/2021 Unit: [facility] 4 IMCU Received request from In- basket. Upon review of chart and communication with care team, patient's decision making abilities are not in question. Pt has contact precautions.  talked to staff, leaving AMD, \"Your Right to Decide\" booklet and a personal message to contact Shiraz Hough if pt has any questions or would like to complete AMD.   
 
Goals of ACP Conversation: 
Discuss Advance Care planning documents Health Care Decision Makers:   
 
Click here to complete English Scientific including selection of the Healthcare Decision Maker Relationship (ie \"Primary\") Today we: Documented Next of Kin, per patient report Advance Care Planning Documents (Patient Wishes) on file: 
None Assessment:   
Unable to visit pt at this time do to contact precautions. Pt has contact precautions.  talked to staff, leaving AMD, \"Your Right to Decide\" booklet and a personal message to contact [phone] Hough if pt has any questions or would like to complete AMD.   
 
Interventions: 
Left information for pt. Outcomes/Plan: AMD not completed at this time.    
 
Electronically signed by Di Sheppard on 5/23/2021 at 3:56 PM

## 2021-05-23 NOTE — PROGRESS NOTES
Spiritual Care Assessment/Progress Note [address] 
 
 
NAME: Marquis Posada      MRN: [id number] AGE: 78 y.o. SEX: male Mandaeism Affiliation: No preference Language: [place] 5/23/2021     Total Time (in minutes): 24 Spiritual Assessment begun in [facility] 4 IMCU through conversation with: 
  
    [x]Patient        [] Family    [] Friend(s) Reason for Consult: Advance medical directive consult Spiritual beliefs: (Please include comment if needed) 
   [] Identifies with a reynaldo tradition:     
   [] Supported by a reynaldo community:        
   [] Claims no spiritual orientation:       
   [] Seeking spiritual identity:            
   [] Adheres to an individual form of spirituality:       
   [x] Not able to assess:                   
 
    
Identified resources for coping:  
   [] Prayer                           
   [] Music                  [] Guided Imagery 
   [] Family/friends                 [] Pet visits [] Devotional reading                         [x] Unknown 
   [] Other:                                         
 
 
Interventions offered during this visit: (See comments for more details) Plan of Care: 
 
 [] Support spiritual and/or cultural needs  
 [] Support AMD and/or advance care planning process    
 [] Support grieving process 
 [] Coordinate Rites and/or Rituals  
 [] Coordination with community clergy [] No spiritual needs identified at this time 
 [] Detailed Plan of Care below (See Comments)  [] Make referral to Music Therapy 
[] Make referral to Pet Therapy    
[] Make referral to Addiction services 
[] Make referral to [facility] 
[] Make referral to Spiritual Care Partner 
[] No future visits requested       
[x] Follow up upon further referrals Comments: Unable to visit pt at this time do to contact precautions. Pt has contact precautions.   talked to staff, leaving AMD, \"Your Right to Decide\" booklet and a personal message to contact [phone] [address] Hough if pt has any questions or would like to complete AMD.   
 
Chaplain Jacklyn Elam M.Div, MACE 
 [id number] [id number])

## 2021-05-23 NOTE — PROGRESS NOTES
Pulmonary Medicine Seen and examined Full note to follow Orders entered D/W DB Cox On steroids and remdesivir Check inflammatory markers and D dimer (if elevated, could be a candidate for ASPEN clinical trial) At risk for further decline-- monitor closely Senora Galeazzi, MD

## 2021-05-23 NOTE — PROGRESS NOTES
Problem: Risk for Spread of Infection Goal: Prevent transmission of infectious organism to others Description: Prevent the transmission of infectious organisms to other patients, staff members, and visitors. Outcome: Progressing Towards Goal 
  
Problem: Patient Education:  Go to Education Activity Goal: Patient/Family Education Outcome: Progressing Towards Goal 
  
Problem: Breathing Pattern - Ineffective Goal: *Absence of hypoxia Outcome: Progressing Towards Goal 
Goal: *Use of effective breathing techniques Outcome: Progressing Towards Goal 
Goal: *PALLIATIVE CARE:  Alleviation of Dyspnea Outcome: Progressing Towards Goal 
  
Problem: Patient Education: Go to Patient Education Activity Goal: Patient/Family Education Outcome: Progressing Towards Goal 
  
Problem: Airway Clearance - Ineffective Goal: Achieve or maintain patent airway Outcome: Progressing Towards Goal 
  
Problem: Gas Exchange - Impaired Goal: Absence of hypoxia Outcome: Progressing Towards Goal 
Goal: Promote optimal lung function Outcome: Progressing Towards Goal 
  
Problem: Breathing Pattern - Ineffective Goal: Ability to achieve and maintain a regular respiratory rate Outcome: Progressing Towards Goal 
  
Problem: Body Temperature -  Risk of, Imbalanced Goal: Ability to maintain a body temperature within defined limits Outcome: Progressing Towards Goal 
Goal: Will regain or maintain usual level of consciousness Outcome: Progressing Towards Goal 
Goal: Complications related to the disease process, condition or treatment will be avoided or minimized Outcome: Progressing Towards Goal 
  
Problem: Nutrition Deficits Goal: Optimize nutrtional status Outcome: Progressing Towards Goal 
  
Problem: Loneliness or Risk for Loneliness Goal: Demonstrate positive use of time alone when socialization is not possible Outcome: Progressing Towards Goal 
  
Problem: Fatigue Goal: Verbalize increase energy and improved vitality Outcome: Progressing Towards Goal 
  
Problem: Patient Education: Go to Patient Education Activity Goal: Patient/Family Education Outcome: Progressing Towards Goal

## 2021-05-23 NOTE — CONSULTS
PULMONARY MEDICINE Initial Physician Consultation Note Name: Ellyn Canales : 1945 MRN: [id number] Date: 2021 Subjective:  
Consult Note: 2021 Requesting Physician: Dr. Sulema Neumann Reason for consult: Resp failure Medical records and data reviewed. Patient is a 78 y.o. male who presented to the hospital with shortness of breath. Patient was seen in consultation for acute respiratory failure. He presented to the emergency room on the  complaining of rectal bleeding and increasing shortness of breath. He was found to be hypoxic in the Emergency Room placed on oxygen support. There is a history of positive covid test by home kit along with several members of his family also positive for Covid 19. Patient received [address] vaccine approximately 2 weeks ago. Been evaluated earlier today, he was on BiPAP with high FiO2 and moderate work of breathing. Patient has been started on REM does abated and is on empiric steroids. Given progressive respiratory failure, order for echo was placed. Inflammatory markers are being monitored. D-dimer will be checked. He may be a candidate for [place] clinical trial if d-dimer is elevated. Information was provided to him and clinical study explained. Patient has underlying history of multiple myeloma with intermittent rectal bleeding. Hemoglobin is stable and he is receiving Lovenox prophylactically. Review of Systems: A comprehensive 12 system review of systems was limited from patient Assessment:  
 
Acute hypoxic respiratory failure Pneumonia due to Covid 19 virus with cytokine storm History of multiple myeloma Rectal bleeding with stable hemoglobin, tolerating Lovenox Other medical problems per chart Recommendations:  
 
Continue oxygen On BiPAP support On empiric antibiotics Receiving dexamethasone Receiving rendesivir I have ordered tocilizumab Check d-dimer Have provided him information about the [facility] [employer] [facility] clinical trial 
Monitor inflammatory markers He is acutely and critically ill and is at high risk for further decline. Critical care has been consulted and will be monitoring patient is well for need for escalation of care and mechanical ventilation Discussed with patient and nurse at bedside Active Problem List:  
 
Problem List  Never Reviewed Codes Class Acute hypoxemic respiratory failure (HCC) ICD-10-CM: J96.01 
ICD-9-CM: 518.81 Pneumonia due to COVID-19 virus ICD-10-CM: U07.1, J12.82 ICD-9-CM: 480.8, 079.89 Past Medical History:  
 
 has a past medical history of Hypertension and Multiple myeloma ([facility] [address] Past Surgical History:  
 
 has a past surgical history that includes hx cholecystectomy. Home Medications:  
 
Prior to Admission medications Medication Sig Start Date End Date Taking? Authorizing Provider FLUoxetine (PROzac) 40 mg capsule Take 40 mg by mouth daily. Yes Provider, Historical  
acyclovir (ZOVIRAX) 400 mg tablet Take 400 mg by mouth two (2) times a day. Yes Provider, Historical  
folic acid (FOLVITE) 1 mg tablet Take 1 mg by mouth daily. Yes Provider, Historical  
amLODIPine (NORVASC) 10 mg tablet Take 10 mg by mouth daily. Yes Provider, Historical  
lisinopril-hydroCHLOROthiazide (PRINZIDE, ZESTORETIC) 20-25 mg per tablet Take 1 Tablet by mouth daily. Yes Provider, Historical  
ferrous sulfate 325 mg (65 mg iron) tablet Take 325 mg by mouth three (3) times daily (with meals). Yes Provider, Historical  
tamsulosin (Flomax) 0.4 mg capsule Take 0.4 mg by mouth daily. Yes Provider, Historical  
omeprazole (PRILOSEC) 40 mg capsule Take 40 mg by mouth daily. Yes Provider, Historical  
pomalidomide 3 mg cap Take 3 mg by mouth daily. He takes 3 mg daily for 21 days with 1 week off. Indications: multiple myeloma   Yes Provider, Historical  
predniSONE (DELTASONE) 10 mg tablet Take  by mouth daily (with breakfast).  taper   Yes Provider, Historical doxycycline (MONODOX) 100 mg capsule Take 100 mg by mouth two (2) times a day. Yes Provider, Historical  
guaiFENesin (ROBITUSSIN) 100 mg/5 mL liquid Take 200 mg by mouth three (3) times daily as needed for Cough. Provider, Historical  
 
 
Allergies/Social/Family History: Allergies Allergen Reactions  Sulfa (Sulfonamide Antibiotics) Itching Social History Tobacco Use  Smoking status: Former Smoker  Smokeless tobacco: Never Used Substance Use Topics  Alcohol use: Not Currently History reviewed. No pertinent family history. Objective:  
Vital Signs: 
Visit Vitals /69 (BP 1 Location: Right upper arm, BP Patient Position: At rest) Pulse (!) 49 Temp 98.3 °F (36.8 °C) Resp 28 Ht 5' 11\" (1.803 m) Wt 101.9 kg (224 lb 11.2 oz) SpO2 100% BMI 31.34 kg/m² O2 Flow Rate (L/min): 50 l/min O2 Device: BIPAP Temp (24hrs), Av.4 °F (36.9 °C), Min:97.6 °F (36.4 °C), Max:99.7 °F (37.6 °C) Intake/Output:  
 
Intake/Output Summary (Last 24 hours) at 2021 1753 Last data filed at 2021 2878 Gross per 24 hour Intake 1220 ml Output 425 ml Net 795 ml Physical Exam:  
General:  Alert, cooperative, on bipap, tachypneic, appears stated age. Head:  Normocephalic, without obvious abnormality, atraumatic. Eyes:  Conjunctivae/corneas clear. PERRL Neck: Supple, symmetrical, no adenopathy, no carotid bruit and no JVD. Lungs:   Diminished BS Chest wall:  No tenderness or deformity. Heart:  Regular rate and rhythm, S1-S2 normal, no murmur, no click, rub or gallop. Abdomen:   Soft, non-tender. Bowel sounds present. No masses,  No organomegaly. Extremities: Atraumatic, no cyanosis or edema. Pulses: Palpable Skin: No rashes or lesions Neurologic: Grossly nonfocal  
 
  
 LABS AND  DATA: Personally reviewed Recent Labs  
  21 
1626 21 
0935 21 
0222 21 
1353 WBC  --   --  15.5* 15.1* HGB 11.8* 11.7* 11.9* 13.5 HCT 35.4* 35.8* 35.7* 40.3 PLT  --   --  372 385 Recent Labs  
  05/23/21 0222 05/22/21 
1353  138  
K 3.8 3.0*  
 104 CO2 22 24 BUN 25* 27* CREA 0.97 1.13  
* 120* CA 7.7* 8.6 MG 2.3 2.3 Recent Labs  
  05/23/21 0222 05/22/21 
1353 AP 65 73  
TP 5.8* 7.3 ALB 2.2* 2.6*  
GLOB 3.6 4.7* Recent Labs  
  05/23/21 0222 INR 1.1 PTP 11.6* No results for input(s): PHI, PCO2I, PO2I, FIO2I in the last 72 hours. No results for input(s): CPK, CKMB, TROIQ, BNPP in the last 72 hours. MEDS: Reviewed Chest Imaging: personally reviewed and report checked Tele- reviewed Medical decision making:  
I have reviewed the flowsheet and previous day's notes Patient has acute or chronic illness that poses a threat to life or bodily function Review and order of Clinical lab tests Review and Order of Radiology tests Independent visualization of Image Reviewed Oxygen/ NiPPV High Risk Drug therapy requiring intensive monitoring for toxicity: eg steroids, pressors, antibiotics CCT 75' Thank you for allowing me to participate in this patient's care. Baldemar Griffith MD 
 
 
[facility]

## 2021-05-23 NOTE — CONSULTS
HPI 
 
82-year-old man who presented on 22 May for rectal bleeding and shortness of breath. Tested positive for Covid about a week before presentation. Received the [employer] shot on 1 May. Patient admitted on to the floors and was being managed for; 
 
Severe Covid pneumonia Chronic intermittent rectal bleeding This morning a rapid response team was called for worsening hypoxia-initially on high flow nasal cannula-had to be escalated to BiPAP therapy. Patient seen, in extremis ROS - unable to properly assess at this time Past Medical History:  
Diagnosis Date  Hypertension  Multiple myeloma ([facility] [address] No current facility-administered medications on file prior to encounter. Current Outpatient Medications on File Prior to Encounter Medication Sig Dispense Refill  FLUoxetine (PROzac) 40 mg capsule Take 40 mg by mouth daily.  acyclovir (ZOVIRAX) 400 mg tablet Take 400 mg by mouth two (2) times a day.  folic acid (FOLVITE) 1 mg tablet Take 1 mg by mouth daily.  amLODIPine (NORVASC) 10 mg tablet Take 10 mg by mouth daily.  lisinopril-hydroCHLOROthiazide (PRINZIDE, ZESTORETIC) 20-25 mg per tablet Take 1 Tablet by mouth daily.  ferrous sulfate 325 mg (65 mg iron) tablet Take 325 mg by mouth three (3) times daily (with meals).  tamsulosin (Flomax) 0.4 mg capsule Take 0.4 mg by mouth daily.  omeprazole (PRILOSEC) 40 mg capsule Take 40 mg by mouth daily.  pomalidomide 3 mg cap Take 3 mg by mouth daily. He takes 3 mg daily for 21 days with 1 week off. Indications: multiple myeloma  predniSONE (DELTASONE) 10 mg tablet Take  by mouth daily (with breakfast). taper  doxycycline (MONODOX) 100 mg capsule Take 100 mg by mouth two (2) times a day.  guaiFENesin (ROBITUSSIN) 100 mg/5 mL liquid Take 200 mg by mouth three (3) times daily as needed for Cough. Current Outpatient Medications Medication Instructions  acyclovir (ZOVIRAX) 400 mg, Oral, 2 TIMES DAILY  amLODIPine (NORVASC) 10 mg, Oral, DAILY  doxycycline (MONODOX) 100 mg, Oral, 2 TIMES DAILY  ferrous sulfate 325 mg, Oral, 3 TIMES DAILY WITH MEALS  
 FLUoxetine (PROZAC) 40 mg, Oral, DAILY  folic acid (FOLVITE) 1 mg, Oral, DAILY  guaiFENesin (ROBITUSSIN) 200 mg, Oral, 3 TIMES DAILY AS NEEDED  
 lisinopril-hydroCHLOROthiazide (PRINZIDE, ZESTORETIC) 20-25 mg per tablet 1 Tablet, Oral, DAILY  omeprazole (PRILOSEC) 40 mg, Oral, DAILY  pomalidomide 3 mg, Oral, DAILY, He takes 3 mg daily for 21 days with 1 week off.  predniSONE (DELTASONE) 10 mg tablet Oral, DAILY WITH BREAKFAST, taper  tamsulosin (FLOMAX) 0.4 mg, Oral, DAILY Allergies Allergen Reactions  Sulfa (Sulfonamide Antibiotics) Itching Social History Socioeconomic History  Marital status:  Spouse name: Not on file  Number of children: Not on file  Years of education: Not on file  Highest education level: Not on file Occupational History  Not on file Tobacco Use  Smoking status: Former Smoker  Smokeless tobacco: Never Used Substance and Sexual Activity  Alcohol use: Not Currently  Drug use: Not on file  Sexual activity: Not on file Other Topics Concern  Not on file Social History Narrative  Not on file Social Determinants of Health Financial Resource Strain:  Difficulty of Paying Living Expenses:   
Food Insecurity:  Worried About [facility] in the Last Year:   
[address] [facility] in the Last Year:   
Transportation Needs:   
 Lack of Transportation (Medical):  Lack of Transportation (Non-Medical): Physical Activity:   
 Days of Exercise per Week:  Minutes of Exercise per Session:   
Stress:  Feeling of Stress :   
Social Connections:  Frequency of Communication with Friends and Family:  Frequency of Social Gatherings with Friends and Family:  Attends Zoroastrian Services:  Active Member of Clubs or Organizations:  Attends Club or Organization Meetings:  Marital Status: Intimate Partner Violence:  Fear of Current or Ex-Partner:  Emotionally Abused:   
 Physically Abused:   
 Sexually Abused:   
 
History reviewed. No pertinent family history. Patient Vitals for the past 24 hrs: 
 Temp Pulse Resp BP SpO2  
05/23/21 1508 98.3 °F (36.8 °C) (!) 49 28 109/69 100 % 05/23/21 1153 97.9 °F (36.6 °C) (!) 55 25 131/73 100 % 05/23/21 0941 98.6 °F (37 °C) (!) 47 23 132/69 100 % 05/23/21 0908     94 % 05/23/21 0645  (!) 55 18  (!) 87 % 05/23/21 0630  67 22  92 % 05/23/21 0615  (!) 52 28  99 % 05/23/21 0600  (!) 55 24 139/68 98 % 05/23/21 0545  (!) 52 27  99 % 05/23/21 0530  (!) 50 20  97 % 05/23/21 0500  (!) 48 22 128/60 (!) 80 % 05/23/21 0430  (!) 46 28  91 % 05/23/21 0400 98.6 °F (37 °C) (!) 55 (!) 33 128/68 97 % 05/23/21 0300  (!) 48 26 123/63 97 % 05/23/21 0200  (!) 54 25 (!) 110/55 91 % 05/23/21 0110  (!) 45 28  99 % 05/23/21 0107  (!) 49 28  98 % 05/23/21 0106  (!) 42 28  99 % 05/23/21 0100  (!) 49 29 126/64 98 % 05/23/21 0000 97.6 °F (36.4 °C) (!) 53 30 (!) 130/58 98 % 05/22/21 2300  (!) 53 27 117/60 97 % 05/22/21 2200  64 22 118/64 92 % 05/22/21 2100  65 28  91 % 05/22/21 2042 98.4 °F (36.9 °C) (!) 58 30 121/68 92 % 05/22/21 2020     95 % 05/22/21 2004  60 21 (!) 125/52 97 % 05/22/21 1930  63 27 107/76 96 % 05/22/21 1900  75 21 138/65 94 % 05/22/21 1830 99.7 °F (37.6 °C) 64 22 132/80 96 % 05/22/21 1715  (!) 57 18 139/74 96 % 05/22/21 1707     96 % Physical exam 
General-in moderate to severe respiratory distress Neuro-grossly intact, generalized weakness Cardiac-bradycardic, regular Abdomen-soft, nontender, nondistended Lungs-coarse bilaterally, impressive use of accessory muscles Extremities-warm Recent Results (from the past 24 hour(s)) LACTIC ACID  
 Collection Time: 05/22/21  5:24 PM  
Result Value Ref Range Lactic acid 1.7 0.4 - 2.0 MMOL/L  
SAMPLES BEING HELD Collection Time: 05/22/21  6:30 PM  
Result Value Ref Range SAMPLES BEING HELD 1lav COMMENT Add-on orders for these samples will be processed based on acceptable specimen integrity and analyte stability, which may vary by analyte. HGB & HCT Collection Time: 05/22/21  6:30 PM  
Result Value Ref Range HGB 11.8 (L) 12.1 - 17.0 g/dL HCT 35.0 (L) 36.6 - 50.3 % LACTIC ACID Collection Time: 05/22/21 10:06 PM  
Result Value Ref Range Lactic acid 2.9 (HH) 0.4 - 2.0 MMOL/L  
LACTIC ACID Collection Time: 05/23/21  1:58 AM  
Result Value Ref Range Lactic acid 2.8 (HH) 0.4 - 2.0 MMOL/L  
METABOLIC PANEL, COMPREHENSIVE Collection Time: 05/23/21  2:22 AM  
Result Value Ref Range Sodium 138 136 - 145 mmol/L Potassium 3.8 3.5 - 5.1 mmol/L Chloride 108 97 - 108 mmol/L  
 CO2 22 21 - 32 mmol/L Anion gap 8 5 - 15 mmol/L Glucose 177 (H) 65 - 100 mg/dL BUN 25 (H) 6 - 20 MG/DL Creatinine 0.97 0.70 - 1.30 MG/DL  
 BUN/Creatinine ratio 26 (H) 12 - 20 GFR est AA >60 >60 ml/min/1.73m2 GFR est non-AA >60 >60 ml/min/1.73m2 Calcium 7.7 (L) 8.5 - 10.1 MG/DL Bilirubin, total 0.7 0.2 - 1.0 MG/DL  
 ALT (SGPT) 43 12 - 78 U/L  
 AST (SGOT) 41 (H) 15 - 37 U/L Alk. phosphatase 65 45 - 117 U/L Protein, total 5.8 (L) 6.4 - 8.2 g/dL Albumin 2.2 (L) 3.5 - 5.0 g/dL Globulin 3.6 2.0 - 4.0 g/dL A-G Ratio 0.6 (L) 1.1 - 2.2 MAGNESIUM Collection Time: 05/23/21  2:22 AM  
Result Value Ref Range Magnesium 2.3 1.6 - 2.4 mg/dL CBC WITH AUTOMATED DIFF Collection Time: 05/23/21  2:22 AM  
Result Value Ref Range WBC 15.5 (H) 4.1 - 11.1 K/uL  
 RBC 3.87 (L) 4.10 - 5.70 M/uL  
 HGB 11.9 (L) 12.1 - 17.0 g/dL HCT 35.7 (L) 36.6 - 50.3 %  MCV 92.2 80.0 - 99.0 FL  
 MCH 30.7 26.0 - 34.0 PG  
 MCHC 33.3 30.0 - 36.5 g/dL  
 RDW 16.1 (H) 11.5 - 14.5 % PLATELET 020 629 - 976 K/uL MPV 12.0 8.9 - 12.9 FL  
 NRBC 0.0 0  WBC ABSOLUTE NRBC 0.00 0.00 - 0.01 K/uL NEUTROPHILS 86 (H) 32 - 75 % LYMPHOCYTES 3 (L) 12 - 49 % MONOCYTES 10 5 - 13 % EOSINOPHILS 0 0 - 7 % BASOPHILS 0 0 - 1 % IMMATURE GRANULOCYTES 1 (H) 0.0 - 0.5 % ABS. NEUTROPHILS 13.2 (H) 1.8 - 8.0 K/UL  
 ABS. LYMPHOCYTES 0.5 (L) 0.8 - 3.5 K/UL  
 ABS. MONOCYTES 1.6 (H) 0.0 - 1.0 K/UL  
 ABS. EOSINOPHILS 0.0 0.0 - 0.4 K/UL  
 ABS. BASOPHILS 0.0 0.0 - 0.1 K/UL  
 ABS. IMM. GRANS. 0.2 (H) 0.00 - 0.04 K/UL  
 DF SMEAR SCANNED    
 RBC COMMENTS ANISOCYTOSIS 
1+ PROTHROMBIN TIME + INR Collection Time: 05/23/21  2:22 AM  
Result Value Ref Range INR 1.1 0.9 - 1.1 Prothrombin time 11.6 (H) 9.0 - 11.1 sec FIBRINOGEN Collection Time: 05/23/21  2:22 AM  
Result Value Ref Range Fibrinogen 712 (H) 200 - 475 mg/dL LD Collection Time: 05/23/21  2:22 AM  
Result Value Ref Range  (H) 85 - 241 U/L  
C REACTIVE PROTEIN, QT Collection Time: 05/23/21  2:22 AM  
Result Value Ref Range C-Reactive protein 17.70 (H) 0.00 - 0.60 mg/dL SAMPLES BEING HELD Collection Time: 05/23/21  2:22 AM  
Result Value Ref Range SAMPLES BEING HELD 1BLU   
 COMMENT Add-on orders for these samples will be processed based on acceptable specimen integrity and analyte stability, which may vary by analyte. LACTIC ACID Collection Time: 05/23/21  9:35 AM  
Result Value Ref Range Lactic acid 2.6 (HH) 0.4 - 2.0 MMOL/L  
HGB & HCT Collection Time: 05/23/21  9:35 AM  
Result Value Ref Range HGB 11.7 (L) 12.1 - 17.0 g/dL HCT 35.8 (L) 36.6 - 50.3 % CXR Results  (Last 48 hours) 05/22/21 1439  XR CHEST PORT Final result Impression:  Scattered interstitial and parenchymal opacities consistent with multifocal  
pneumonia. Cardiomegaly Narrative:  Clinical history: covid SOB INDICATION:   covid SOB  
COMPARISON: None FINDINGS:  
AP portable upright view of the chest demonstrates an enlarged cardiopericardial  
silhouette. There is no pleural effusion. .There is no focal  
consolidation. .Interstitial and parenchymal opacities. . Patient is on a cardiac  
monitor. Other imaging reviewed Assessment Severe Covid pneumonia Rectal bleeding History of multiple myeloma Plan 
 
PT/OT/out of bed when more stable, continue Prozac Continue hemodynamic monitoring, on the bradycardic side with blood pressure preserved-monitor Currently on BiPAP-at high risk of needing invasive mechanical ventilation, saturation goal greater than 90%, encourage proning if possible, continue steroids N.p.o. for now Monitor urine output, correct electrolyte derangements as needed, continue Flomax Considering chronic rectal bleeding unsure if he is a candidate for chemical DVT prophylaxis at this time, continue iron Continue remdesivir, per pharmacist not a candidate for Tocilizumab, continue antibiotics for possible superimposed CAP, continue home acyclovir Keep glucose less than 180 Patient critically ill Critical care time-40 minutes Signed By: Mich Conley MD   
 May 23, 2021

## 2021-05-24 NOTE — PROGRESS NOTES
0000 - Bedside shift change report given to Mary Stein 83, RN (oncoming nurse) by Mary Cardona RN (offgoing nurse). Report included the following information SBAR, Kardex, ED Summary, Intake/Output, MAR, Recent Results and Cardiac Rhythm Sinus [facility]. Bedside shift change report given to Ruth Boston (oncoming nurse) by Mary Stein 83, DB (offgoing nurse). Report included the following information SBAR, Kardex, ED Summary, Intake/Output, MAR, Recent Results and Cardiac Rhythm Sinus [facility].

## 2021-05-24 NOTE — PROGRESS NOTES
05/24/21 1315 Oxygen Therapy FIO2 (%) 90 % (weaned) I strongly suggest weaning patient bipap support before High Flow as the patient demonstrated he is not ready RN is aware Will continue to monitor and wean as patient tolerates.

## 2021-05-24 NOTE — PROGRESS NOTES
05/24/21 1300 Oxygen Therapy O2 Sat (%) (!) 79 % Pulse via Oximetry 60 beats per minute O2 Device Heated; Hi flow nasal cannula O2 Flow Rate (L/min) 60 l/min O2 Temperature 93.2 °F (34 °C) FIO2 (%) 95 % Pre-Treatment Breathing Pattern Labored;Dyspnea at rest  
Breath Sounds Bilateral Diminished Left Breath Sounds Diminished Respirations 17  
patient trailed on High Flow 60 liters and 95% Saturations 76% [id number] Patient returned to [facility], instantly patient saturations come up to 90%

## 2021-05-24 NOTE — PROGRESS NOTES
Clinical Pharmacy Note: Ceftriaxone Dosing Please note that the ceftriaxone dose for Ellyn Canales has been changed to 1000 mg IV q24h per [facility]-approved protocol. Please contact the pharmacy with any questions.  
 
Barbra Fernandez, PHARMD

## 2021-05-24 NOTE — PROGRESS NOTES
Verbal shift change report given to Alivia Calhoun RN (oncoming nurse) by Edith Earl RN (offgoing nurse). Report included the following information SBAR, Kardex, MAR and Recent Results.

## 2021-05-25 NOTE — ACP (ADVANCE CARE PLANNING)
Advance Care Planning Advance Care Planning Activator (Inpatient) Conversation Note Date of ACP Conversation: 05/25/21 Conversation Conducted with:   Patient with Decision Making Capacity ACP Activator: Fawad Zavala RN [facility] Care Decision Maker: 
 
Current Designated Health Care Decision Maker:  
  Primary Decision Maker: Polina Omar - Spouse - [phone] Click here to complete [address] including selection of the Healthcare Decision Maker Relationship (ie \"Primary\") Care Preferences Ventilation: \"If you were in your present state of health and suddenly became very ill and were unable to breathe on your own, what would your preference be about the use of a ventilator (breathing machine) if it were available to you? \" If patient would desire the use of a ventilator (breathing machine), answer \"yes\", if not \"no\":yes \"If your health worsens and it becomes clear that your chance of recovery is unlikely, what would your preference be about the use of a ventilator (breathing machine) if it were available to you? \" Would the patient desire the use of a ventilator (breathing machine)? YES Resuscitation \"CPR works best to restart the heart when there is a sudden event, like a heart attack, in someone who is otherwise healthy. Unfortunately, CPR does not typically restart the heart for people who have serious health conditions or who are very sick. \" \"In the event your heart stopped as a result of an underlying serious health condition, would you want attempts to be made to restart your heart (answer \"yes\" for attempt to resuscitate) or would you prefer a natural death (answer \"no\" for do not attempt to resuscitate)? \" yes [x] Yes  [] No   Educated Patient / Ele [place] regarding differences between Advance Directives and portable DNR orders. Length of ACP Conversation in minutes:  10 Conversation Outcomes: 
[] ACP discussion completed [] Existing advance directive reviewed with patient; no changes to patient's previously recorded wishes 
 
 [] New Advance Directive completed 
 [] Portable Do Not Resuscitate prepared for Provider review and signature 
[] POLST/POST/MOLST/MOST prepared for Provider review and signature Follow-up plan:   
[] Schedule follow-up conversation to continue planning 
[] Referred individual to Provider for additional questions/concerns  
[] Advised patient/agent/surrogate to review completed ACP document and update if needed with changes in condition, patient preferences or care setting  
 
[] This note routed to one or more involved healthcare providers

## 2021-05-25 NOTE — PROGRESS NOTES
[facility] Hospitalist Progress Note Elliot Prince MD 
Answering service: [phone] OR [phone] from in house phone Date of Service:  2021 NAME:  Floridalma Mccormack :  1945 MRN:  [id number] This documentation was facilitated by a Voice Recognition software and may contain inadvertent typographical errors. Admission Summary: This is a 30-year-old gentleman who is currently undergoing treatment for multiple myeloma was brought to the ED for rectal bleed and shortness of breath. Upon arrival of EMS at his home, patient was found to be in respiratory distress and hypoxic with SPO2 in the 70s and he was put on NRB and brought to the ED. In the ED hospital on high flow oxygen. Interval history / Subjective:  
    
I have seen patient earlier along with RN.  Mr. Brien Howard notes feeling better today. He is on BiPAP, breathing comfortably. We took him off the BiPAP and placed him on HFNC, his SPO2 dropped to low 80s sitting by the edge of the bed but slowly recovered to 91-92%. Assessment & Plan:  
Acute hypoxic respiratory failure Bilateral pneumonia likely Covid19 pneumonia Severe sepsis (presented with hypoxic respiratory failure, lactic acidosis, leukocytosis) secondary to bilateral pneumonia. 
-On BiPAP, alternating with high flow as needed. 
-On dexamethasone. Lovenox for Covid protocol(no rectal bleeding since admission). Continue empiric antibiotics. -Stopped remdesivir due to worsening bradycardia. Status post Actemra per intensivist. 
-Droplet plus and need for isolation 
-Monitor inflammatory and procoagulant factors Chronic intermittent rectal bleeding, he had an episode this morning. Per his wife he had chronic intermittent rectal bleed related to his cancer and chemotherapy.   Rectal bleed improved with recent switch of his chemotherapy Pomalyst per his wife. -IV Protonix 
-No rectal bleeding since admission. Hemoglobin slightly down which could be due to hemodilution as he has received fluid in the ED is stable. Given high risk of VTE and complications in the setting of Covid ,reasonable to continue with prophylactic anticoagulation while closely watching for recurrence of rectal bleeding. If rectal bleeding recurs and/or hemoglobin drops significantly, we may have to stop prophylactic anticoagulation. 
-Hemoglobin remained stable. Bradycardia. Although  low heart rate started prior to initiation of remdesivir, heart rate is dipping as low as 30s today. Considering little/marginal benefits of Remdesevir in the setting of severe Covid requiring high flow/NPPV requirement, will discontinue remdesivir. -HR better. Hypokalemia: Replace and monitor History of multiple myeloma: On Pomalyst 3 mg daily for 21 days with 7 days off. Patient usually follows at [facility] on hold, oncology agreed. Hypertension, hold antihypertensives for now. Continue PTA acyclovir, folic acid, Prozac and tamsulosin. PT medication list obtained from family on the phone Patient's Baseline: Ambulates with walking DVT ppx: Lovenox per Covid protocol Code status:He reaffirmed he would like to remain full code. Disposition: TBD 
5/23: Called and update his wife of the events that his now is on BiPAP,he may be transferred to ICU. If the BIPAP fails,he will have to be intubated. Casandra Phan. Phone [phone] His wife is now admitted in this hospital, and updated her on his current state upon her request. 
 
Anticipated Discharge: Greater than 48 hours Hospital Problems  Never Reviewed Codes Class Noted POA Acute hypoxemic respiratory failure ([facility] [address] ICD-10-CM: J96.01 
ICD-9-CM: 518.81  5/22/2021 Unknown Pneumonia due to COVID-19 virus ICD-10-CM: U07.1, J12.82 ICD-9-CM: 480.8, 079.89  5/22/2021 Unknown Review of Systems: A comprehensive review of systems was negative except for that written in the HPI. Vital Signs:  
 Last 24hrs VS reviewed since prior progress note. Most recent are: 
Visit Vitals BP (!) 145/73 (BP 1 Location: Right upper arm, BP Patient Position: At rest) Pulse (!) 45 Temp 98.7 °F (37.1 °C) Resp 26 Ht 5' 11\" (1.803 m) Wt 101.9 kg (224 lb 11.2 oz) SpO2 98% BMI 31.34 kg/m² No intake or output data in the 24 hours ending 05/25/21 1256 Physical Examination:  
 
I had a face to face encounter with this patient and independently examined them on5/25/2021 as outlined below: 
     
Constitutional:  Lifting sitting by the edge of the bed, on high flow oxygen via nasal cannula. HEENT:  Atraumatic. Oral mucosa moist,. Non icteric sclera. No pallor. Resp:  On HFNC. Wava Prim Wava Prim Bilateral crepitations Chest Wall: No deformity CV:  Regular rhythm, normal rate, no murmurs, gallops, rubs GI:  Soft, non distended, non tender. normoactive bowel sounds, no hepatosplenomegaly :  No CVA or suprapubic tenderness Musculoskeletal:  No edema, warm, 2+ pulses throughout Neurologic:  Mental status:AAOx4. Cranial nerves II-XII : WNL Motor exam:Moves all extremities symmetrically Data Review:  
 Review and/or order of clinical lab test 
Review and/or order of tests in the radiology section of CPT Review and/or order of tests in the medicine section of CPT Labs:  
 
Recent Labs  
  05/25/21 0512 05/24/21 1611 05/23/21 0222 05/22/21 
1353 WBC  --   --  15.5* 15.1* HGB 11.7* 12.1 11.9* 13.5 HCT 35.7* 37.2 35.7* 40.3 PLT  --   --  372 385 Recent Labs  
  05/25/21 
0512 05/24/21 
1611 05/23/21 0222 05/22/21 
1353  143 138 138  
K 3.5 4.0 3.8 3.0*  
 109* 108 104 CO2 25 27 22 24 BUN 31* 29* 25* 27* CREA 0.88 0.80 0.97 1.13  
* 101* 177* 120* CA 8.2* 8.1* 7.7* 8.6 MG  --  2.1 2.3 2.3 Recent Labs  
  05/25/21 
0512 05/24/21 [id number] 05/23/21 0222 ALT 34 39 43 AP 71 72 65 TBILI 0.5 0.5 0.7 TP 5.9* 5.9* 5.8* ALB 2.2* 2.2* 2.2*  
GLOB 3.7 3.7 3.6 Recent Labs  
  05/23/21 0222 INR 1.1 PTP 11.6* Recent Labs  
  05/23/21 
1626 FERR 1,769* No results found for: FOL, RBCF No results for input(s): PH, PCO2, PO2 in the last 72 hours. No results for input(s): CPK, CKNDX, TROIQ in the last 72 hours. No lab exists for component: CPKMB No results found for: CHOL, CHOLX, CHLST, CHOLV, HDL, HDLP, LDL, LDLC, DLDLP, TGLX, TRIGL, TRIGP, CHHD, CHHDX No results found for: [facility] No results found for: COLOR, APPRN, SPGRU, REFSG, BRYANNA, PROTU, GLUCU, KETU, BILU, UROU, DANNY, LEUKU, GLUKE, EPSU, BACTU, WBCU, RBCU, CASTS, UCRY Medications Reviewed:  
 
Current Facility-Administered Medications Medication Dose Route Frequency  furosemide (LASIX) injection 20 mg  20 mg IntraVENous DAILY  fluticasone propionate (FLONASE) 50 mcg/actuation nasal spray 2 Spray  2 Spray Both Nostrils QHS  sodium chloride (OCEAN) 0.65 % nasal squeeze bottle 2 Spray  2 Spray Both Nostrils Q2H PRN  pantoprazole (PROTONIX) 40 mg in 0.9% sodium chloride 10 mL injection  40 mg IntraVENous Q24H  
 sodium chloride (NS) flush 5-40 mL  5-40 mL IntraVENous Q8H  
 sodium chloride (NS) flush 5-40 mL  5-40 mL IntraVENous PRN  
 sodium chloride (NS) flush 5-40 mL  5-40 mL IntraVENous Q8H  
 sodium chloride (NS) flush 5-40 mL  5-40 mL IntraVENous PRN  
 acetaminophen (TYLENOL) tablet 650 mg  650 mg Oral Q6H PRN Or  
 acetaminophen (TYLENOL) suppository 650 mg  650 mg Rectal Q6H PRN  polyethylene glycol (MIRALAX) packet 17 g  17 g Oral DAILY PRN  promethazine (PHENERGAN) tablet 12.5 mg  12.5 mg Oral Q6H PRN Or  
 ondansetron (ZOFRAN) injection 4 mg  4 mg IntraVENous Q6H PRN  
 dexamethasone (DECADRON) 4 mg/mL injection 6 mg  6 mg IntraVENous Q24H  
 acyclovir (ZOVIRAX) tablet 400 mg  400 mg Oral BID  ferrous sulfate tablet 325 mg  1 Tablet Oral TID WITH MEALS  
 FLUoxetine (PROzac) capsule 40 mg  40 mg Oral DAILY  folic acid (FOLVITE) tablet 1 mg  1 mg Oral DAILY  tamsulosin (FLOMAX) capsule 0.4 mg  0.4 mg Oral DAILY  enoxaparin (LOVENOX) injection 40 mg  40 mg SubCUTAneous Q12H  
 
______________________________________________________________________ EXPECTED LENGTH OF STAY: 4d 19h ACTUAL LENGTH OF STAY:          3 Belen Thomas MD

## 2021-05-25 NOTE — PROGRESS NOTES
PULMONARY MEDICINE Progress Note Name: Yves Moeller : 1945 MRN: [id number] Date: 2021 Subjective:  
 On 60lpm and 100% Fio2; sats low 90s and upper 80s. Looks unlabored Consult Note:  Requesting Physician: Dr. Uziel Jones Reason for consult: Resp failure Medical records and data reviewed. Patient is a 78 y.o. male who presented to the hospital with shortness of breath. Patient was seen in consultation for acute respiratory failure. He presented to the emergency room on the  complaining of rectal bleeding and increasing shortness of breath. He was found to be hypoxic in the Emergency Room placed on oxygen support. There is a history of positive covid test by home kit along with several members of his family also positive for Covid 19. Patient received [employer] vaccine approximately 2 weeks ago. Been evaluated earlier today, he was on BiPAP with high FiO2 and moderate work of breathing. Patient has been started on REM does abated and is on empiric steroids. Given progressive respiratory failure, order for echo was placed. Inflammatory markers are being monitored. D-dimer will be checked. He may be a candidate for ASPEN clinical trial if d-dimer is elevated. Information was provided to him and clinical study explained. Patient has underlying history of multiple myeloma with intermittent rectal bleeding. Hemoglobin is stable and he is receiving Lovenox prophylactically. Review of Systems: A comprehensive 12 system review of systems was limited from patient Assessment:  
 
Acute hypoxic respiratory failure Pneumonia due to Covid 19 virus with cytokine storm; s/p J&J vaccine approx 2 wks ago History of multiple myeloma Former smoker, 20-30 years. No formal dx of COPD Rectal bleeding with stable hemoglobin, tolerating Lovenox Other medical problems per chart Recommendations:  
 
O2 titration above 90%, on NIV and high flow Check vit d and blood type  
Monitor d dimer, probnp and inflammatory markers Prone > 8 hours On empiric antibiotics, can stop. procal 0.11 Start daily lasix Check IgG subclasses Receiving dexamethasone; if CRP still elevated would consider transition to  
rendesivir, stopped  
s/p tocilizumab on 5/23 the Aspen clinical trial; per Dr Wes Bennett Monitor inflammatory markers Patient is critically ill and at high risk for further decline, mechanical ventilation, and ICU admission due to acute hypoxic resp failure and COVID19 PNA ; CC time spent excluding procedures is > 35 minutes Discussed with patient and nurse at bedside Active Problem List:  
 
Problem List  Never Reviewed Codes Class Acute hypoxemic respiratory failure (HCC) ICD-10-CM: J96.01 
ICD-9-CM: 518.81 Pneumonia due to COVID-19 virus ICD-10-CM: U07.1, J12.82 ICD-9-CM: 480.8, 079.89 Past Medical History:  
 
 has a past medical history of Hypertension and Multiple myeloma ([facility] [address] Past Surgical History:  
 
 has a past surgical history that includes hx cholecystectomy. Home Medications:  
 
Prior to Admission medications Medication Sig Start Date End Date Taking? Authorizing Provider FLUoxetine (PROzac) 40 mg capsule Take 40 mg by mouth daily. Yes Provider, Historical  
acyclovir (ZOVIRAX) 400 mg tablet Take 400 mg by mouth two (2) times a day. Yes Provider, Historical  
folic acid (FOLVITE) 1 mg tablet Take 1 mg by mouth daily. Yes Provider, Historical  
amLODIPine (NORVASC) 10 mg tablet Take 10 mg by mouth daily. Yes Provider, Historical  
lisinopril-hydroCHLOROthiazide (PRINZIDE, ZESTORETIC) 20-25 mg per tablet Take 1 Tablet by mouth daily. Yes Provider, Historical  
ferrous sulfate 325 mg (65 mg iron) tablet Take 325 mg by mouth three (3) times daily (with meals). Yes Provider, Historical  
tamsulosin (Flomax) 0.4 mg capsule Take 0.4 mg by mouth daily.    Yes Provider, Historical  
omeprazole (PRILOSEC) 40 mg capsule Take 40 mg by mouth daily. Yes Provider, Historical  
pomalidomide 3 mg cap Take 3 mg by mouth daily. He takes 3 mg daily for 21 days with 1 week off. Indications: multiple myeloma   Yes Provider, Historical  
predniSONE (DELTASONE) 10 mg tablet Take  by mouth daily (with breakfast). taper   Yes Provider, Historical  
doxycycline (MONODOX) 100 mg capsule Take 100 mg by mouth two (2) times a day. Yes Provider, Historical  
guaiFENesin (ROBITUSSIN) 100 mg/5 mL liquid Take 200 mg by mouth three (3) times daily as needed for Cough. Provider, Historical  
 
 
Allergies/Social/Family History: Allergies Allergen Reactions  Sulfa (Sulfonamide Antibiotics) Itching Social History Tobacco Use  Smoking status: Former Smoker  Smokeless tobacco: Never Used Substance Use Topics  Alcohol use: Not Currently History reviewed. No pertinent family history. Objective:  
Vital Signs: 
Visit Vitals BP (!) 151/78 (BP 1 Location: Right upper arm, BP Patient Position: At rest) Pulse (!) 45 Temp 97.8 °F (36.6 °C) Resp 20 Ht 5' 11\" (1.803 m) Wt 101.9 kg (224 lb 11.2 oz) SpO2 96% BMI 31.34 kg/m² O2 Flow Rate (L/min): 60 l/min O2 Device: BIPAP Temp (24hrs), Av.9 °F (36.6 °C), Min:97.7 °F (36.5 °C), Max:98.1 °F (36.7 °C) Intake/Output:  
No intake or output data in the 24 hours ending 21 1005 Physical Exam:  
General:  Alert, cooperative, on bipap, tachypneic, appears stated age. Head:  Normocephalic, without obvious abnormality, atraumatic. Eyes:  Conjunctivae/corneas clear. PERRL Neck: Supple, symmetrical, no adenopathy, no carotid bruit and no JVD. Lungs:   Diminished BS Chest wall:  No tenderness or deformity. Heart:  Regular rate and rhythm, S1-S2 normal, no murmur, no click, rub or gallop. Abdomen:   Soft, non-tender. Bowel sounds present. No masses,  No organomegaly. Extremities: Atraumatic, no cyanosis or edema.   
Pulses: Palpable Skin: No rashes or lesions Neurologic: Grossly nonfocal  
 
  
 LABS AND  DATA: Personally reviewed Recent Labs  
  05/25/21 
0512 05/24/21 
1611 05/23/21 0222 05/22/21 
1353 WBC  --   --  15.5* 15.1* HGB 11.7* 12.1 11.9* 13.5 HCT 35.7* 37.2 35.7* 40.3 PLT  --   --  372 385 Recent Labs  
  05/25/21 
0512 05/24/21 
1611 05/23/21 0222  143 138  
K 3.5 4.0 3.8  109* 108 CO2 25 27 22 BUN 31* 29* 25* CREA 0.88 0.80 0.97 * 101* 177* CA 8.2* 8.1* 7.7* MG  --  2.1 2.3 Recent Labs  
  05/25/21 0512 05/24/21 
1611 AP 71 72  
TP 5.9* 5.9* ALB 2.2* 2.2*  
GLOB 3.7 3.7 Recent Labs  
  05/23/21 0222 INR 1.1 PTP 11.6* No results for input(s): PHI, PCO2I, PO2I, FIO2I in the last 72 hours. No results for input(s): CPK, CKMB, TROIQ, BNPP in the last 72 hours. MEDS: Reviewed Chest Imaging: personally reviewed and report checked Tele- reviewed Medical decision making:  
I have reviewed the flowsheet and previous day's notes Patient has acute or chronic illness that poses a threat to life or bodily function Review and order of Clinical lab tests Review and Order of Radiology tests Independent visualization of Image Reviewed Oxygen/ NiPPV High Risk Drug therapy requiring intensive monitoring for toxicity: eg steroids, pressors, antibiotics CCT 75' Thank you for allowing me to participate in this patient's care. Eirc Ovalles PA-C [facility]

## 2021-05-25 NOTE — PROGRESS NOTES
Pt sats dropped into low 80s on HF 60L, 100%. Pt c/o fatigue. Placed back on Bipap with return of normal sats.

## 2021-05-25 NOTE — CONSULTS
PULMONARY MEDICINE Assessing Safety, Hospitalization and Efficacy of rNAPc2 in COVID-19 (ASPEN-COVID-19) - Protocol Number: NAPc-201/301 RCT Screening Documentation (PI) Name: Diane Chavez : 1945 MRN: [id number] Date: 2021 Subjective:  
Study physical per protocol: 2021 Medical records and data reviewed. Patient is a 78 y.o. male who was admitted to the hospital for treatment of COVID 19 pneumonia with hypoxemia. Baseline D dimer qualifying patient for participation in the study was noted to be:Results for Norma Ort (MRN [id number]) as of 2021 16:18 Ref. Range 2021 16:26 D-dimer Latest Ref Range: 0.00 - 0.65 mg/L FEU 1.57 (H) Referred for participation in  Assessing Safety, Hospitalization and Efficacy of rNAPc2 in COVID-19 (ASPEN-COVID-19) - Protocol Number: NAPc-201/301-- ICF signed by patient, discussed by  
 
Screening completed, Inclusion and exclusion criteria reviewed Randomization into clinical trial to follow after screening requirements are met- see separate note Screening requirements for clinical trial: 
Positive SARS CoV2 RT-PCR within 7 days Results for Norma Ort (MRN [id number]) as of 2021 16:18 Ref. Range 2021 16:07 COVID-19 RAPID TEST Unknown Rpt (A) D Dimer more than upper limit of normal Results for Norma Ort (MRN [id number]) as of 2021 16:18 Ref. Range 2021 16:26 D-dimer Latest Ref Range: 0.00 - 0.65 mg/L FEU 1.57 (H) No exclusion criteria from list below Inclusion criteria:  
Patients must meet all of the following inclusion criteria to be eligible for randomization in this study: 1. Age ? 18 years and ? 90 years at the Screening assessment 2. Weight ? 50 kg at randomization 3. Hospitalized with a diagnosis of COVID-19 and in need of inpatient medical care 4.  Positive for SARS-CoV-2 on nasopharyngeal, oropharyngeal or other tissue/body fluid samples by PCR or validated other test of ongoing infection (not an antibody test for prior exposure), within seven (7) days prior to hospitalization or screening assessment 5. D-dimer level > upper limit of normal at screening 6. Provided electronic or written informed consent, either personally or through a legally authorized representative (LAR) 7. Must agree not to participate in a concurrent interventional study involving anticoagulation or anti-platelet therapy 8. Any woman who suspects she may be pregnant must have a negative pregnancy test prior to randomization. Women of child-bearing potential should agree to avoid pregnancy for 3 weeks following randomization Exclusion criteria: 
Patients who meet any of the following exclusion criteria are not eligible for randomization in this study: 1. High bleeding risk, e.g. major surgery within prior 1 month, history of a major bleed while receiving anticoagulation, current or planned (during current hospitalization) dual anti-platelet therapy, platelet count <46,002/TL, current anticoagulation for a medical indication. e.g. atrial fibrillation. Patients receiving prophylactic anticoagulation are eligible if they are willing to discontinue current anticoagulation. 2. Sustained systolic blood pressure < 90 mmHg considered to be clinically significant 3. Persistent eGFR <20 ml/min/1.73m2 4. Known severe liver disease (e.g. bilirubin >3.5 mg/dL (60 umol/L)) 5. Life expectancy estimated to be < 72 hours based on current clinical condition 6. Anticipated hospital discharge or transfer within 72 hours based on current clinical condition 7. Known anti-phospholipid syndrome 8. Unable to receive heparin, e.g. history of heparin-induced thrombocytopenia and thrombosis (NILSON) 9. Participation in a clinical study or treatment with an investigational drug or device, defined as one not approved for any indication in the [facility], [place] or [place], within seven (7) days of the Screening assessment or within 5 half-lives of the investigational agent, whichever is longer MEASURES TO MINIMIZE BIAS: RANDOMIZATION AND BLINDING Intervention Allocation Site-based randomization will be implemented in this study. Participants will be randomly assigned to treatment groups based on a computer-generated randomization schedule prepared before the study by or under the supervision of the Sponsor. The randomization will be balanced by using randomly permuted blocks for each site and the D-dimer level stratification. After confirming eligibility, the requestor will obtain the participant's randomization assignment. Blinding To minimize the potential for bias, data that may potentially unblind the intervention assignment will be handled with special care to maintain the integrity of blinding for the participant and the [address] [place] [address]. This includes discouraging speculation and avoiding verbal disclosure to the participant and redacting the data in question from medical records reviewed by the [address] [place] [address]. Study site investigators assessing potential clinical efficacy and safety events should be blinded to treatment allocation, if possible. Neither the study site, sponsor, CEC, nor study participants will have access to the central laboratory results during the conduct of the trial.  
The investigator will not be provided with randomization codes. The study site will know the treatment assignment, however; participants should not be made aware of their treatment assignment. STUDY INTERVENTION COMPLIANCE Both rNAPc2 and heparin should be administered and documented in-hospital by hospital staff. Dates, doses and mode of administration will be recorded CONCOMITANT THERAPY Prohibited concomitant therapy  Treatment with an investigational drug or device, defined as one not approved for any indication in the [address] [place] [address],[address] [place] [address], [facility] or [place].  If treatment with an investigational drug or device is initiated, study medication should be discontinued. Follow up procedures continue as shown on the SoA.  Dual antiplatelet therapy, e.g., aspirin + ticagrelor, clopidogrel, or prasugrel  Systemic anticoagulation, e.g., warfarin, factor Xa inhibitors, direct thrombin inhibitors RESCUE MEDICINE for serious bleeding complication: 
rNAPc2 is expected to be dosed in-hospital on days 1, 3 and 5; half-life of rNAPc2 following the day 5 dose is approximately 72 hours. Heparin is expected to be dosed in-hospital; half-life of LMWH is approximately 4½ hours, half-life of UFH is approximately 1 hour. If serious bleeding occurs in-hospital the following routine measures should be considered:  Delay the next study drug administration or discontinue treatment if indicated.  Consider the usual treatment measures for bleeding events, including fluid replacement and hemodynamic support, blood transfusion, and fresh frozen plasma, if physical examination and laboratory testing suggest benefit could be obtained. If rescue management is required, participant should remain blinded to treatment assignment if possible. For uncontrolled major bleeding in a patient receiving rNAPc2, recombinant activated clotting factor VII (rfVIIa) may be administered. In a Phase 1 study in healthy volunteers, administration of rfVIIa 4 hours after rNAPc2 normalized PT, which was correlated with the activation of the coagulation cascade as measured using biochemical markers of factor X and factor IX activation. This study suggests that rfVIIa may be useful in transiently reversing the anticoagulant effects of rNAPc2. However, the effect of rfVIIa in an actively bleeding patient who has received rNAPc2 is currently unknown (refer to the Investigator Brochure).   
If the participant is not hospitalized, a serious bleeding event should be referred for evaluation at the appropriate facility such as an urgent care center or emergency department depending on the severity of the bleeding. Active Problem List:  
 
Problem List  Never Reviewed Codes Class Acute hypoxemic respiratory failure (HCC) ICD-10-CM: J96.01 
ICD-9-CM: 518.81 Pneumonia due to COVID-19 virus ICD-10-CM: U07.1, J12.82 ICD-9-CM: 480.8, 079.89 Current medications:  
 
Current Facility-Administered Medications Medication Dose Route Frequency  furosemide (LASIX) injection 20 mg  20 mg IntraVENous DAILY  fluticasone propionate (FLONASE) 50 mcg/actuation nasal spray 2 Spray  2 Spray Both Nostrils QHS  pantoprazole (PROTONIX) 40 mg in 0.9% sodium chloride 10 mL injection  40 mg IntraVENous Q24H  
 sodium chloride (NS) flush 5-40 mL  5-40 mL IntraVENous Q8H  
 sodium chloride (NS) flush 5-40 mL  5-40 mL IntraVENous Q8H  
 dexamethasone (DECADRON) 4 mg/mL injection 6 mg  6 mg IntraVENous Q24H  
 acyclovir (ZOVIRAX) tablet 400 mg  400 mg Oral BID  ferrous sulfate tablet 325 mg  1 Tablet Oral TID WITH MEALS  
 FLUoxetine (PROzac) capsule 40 mg  40 mg Oral DAILY  folic acid (FOLVITE) tablet 1 mg  1 mg Oral DAILY  tamsulosin (FLOMAX) capsule 0.4 mg  0.4 mg Oral DAILY  enoxaparin (LOVENOX) injection 40 mg  40 mg SubCUTAneous Q12H Past Medical History:  
 
 has a past medical history of Hypertension and Multiple myeloma ([facility] [address] Past Surgical History:  
 
 has a past surgical history that includes hx cholecystectomy. Home Medications:  
 
Prior to Admission medications Medication Sig Start Date End Date Taking? Authorizing Provider FLUoxetine (PROzac) 40 mg capsule Take 40 mg by mouth daily. Yes Provider, Historical  
acyclovir (ZOVIRAX) 400 mg tablet Take 400 mg by mouth two (2) times a day. Yes Provider, Historical  
folic acid (FOLVITE) 1 mg tablet Take 1 mg by mouth daily. Yes Provider, Historical  
amLODIPine (NORVASC) 10 mg tablet Take 10 mg by mouth daily.    Yes Provider, Historical  
lisinopril-hydroCHLOROthiazide (PRINZIDE, ZESTORETIC) 20-25 mg per tablet Take 1 Tablet by mouth daily. Yes Provider, Historical  
ferrous sulfate 325 mg (65 mg iron) tablet Take 325 mg by mouth three (3) times daily (with meals). Yes Provider, Historical  
tamsulosin (Flomax) 0.4 mg capsule Take 0.4 mg by mouth daily. Yes Provider, Historical  
omeprazole (PRILOSEC) 40 mg capsule Take 40 mg by mouth daily. Yes Provider, Historical  
pomalidomide 3 mg cap Take 3 mg by mouth daily. He takes 3 mg daily for 21 days with 1 week off. Indications: multiple myeloma   Yes Provider, Historical  
predniSONE (DELTASONE) 10 mg tablet Take  by mouth daily (with breakfast). taper   Yes Provider, Historical  
doxycycline (MONODOX) 100 mg capsule Take 100 mg by mouth two (2) times a day. Yes Provider, Historical  
guaiFENesin (ROBITUSSIN) 100 mg/5 mL liquid Take 200 mg by mouth three (3) times daily as needed for Cough. Provider, Historical  
 
 
Allergies/Social/Family History: Allergies Allergen Reactions  Sulfa (Sulfonamide Antibiotics) Itching Social History Tobacco Use  Smoking status: Former Smoker  Smokeless tobacco: Never Used Substance Use Topics  Alcohol use: Not Currently History reviewed. No pertinent family history. Objective:  
Vital Signs: 
Visit Vitals BP (!) 140/79 (BP 1 Location: Right upper arm, BP Patient Position: At rest) Pulse (!) 43 Temp 98 °F (36.7 °C) Resp 24 Ht 5' 11\" (1.803 m) Wt 101.9 kg (224 lb 11.2 oz) SpO2 100% BMI 31.34 kg/m² O2 Flow Rate (L/min): 60 l/min O2 Device: BIPAP Temp (24hrs), Av °F (36.7 °C), Min:97.7 °F (36.5 °C), Max:98.7 °F (37.1 °C) Intake/Output:  
No intake or output data in the 24 hours ending 21 1617 Physical exam findings of attending physician rounding on patient today reviewed as documented, agree LABS AND  DATA: Personally reviewed Recent Labs  
  21 
1254 21 
0512 21 
0222 WBC  --   --  15.5* HGB 12.2 11.7* 11.9*  
HCT 37.5 35.7* 35.7* PLT  --   --  372 Recent Labs  
  05/25/21 
0512 05/24/21 
1611 05/23/21 0222  143 138  
K 3.5 4.0 3.8  109* 108 CO2 25 27 22 BUN 31* 29* 25* CREA 0.88 0.80 0.97 * 101* 177* CA 8.2* 8.1* 7.7* MG  --  2.1 2.3 Recent Labs  
  05/25/21 
0512 05/24/21 1611 AP 71 72  
TP 5.9* 5.9* ALB 2.2* 2.2*  
GLOB 3.7 3.7 Recent Labs  
  05/23/21 0222 INR 1.1 PTP 11.6* No results for input(s): PHI, PCO2I, PO2I, FIO2I in the last 72 hours. No results for input(s): CPK, CKMB, TROIQ, BNPP in the last 72 hours. Recent Results (from the past 24 hour(s)) METABOLIC PANEL, COMPREHENSIVE Collection Time: 05/25/21  5:12 AM  
Result Value Ref Range Sodium 141 136 - 145 mmol/L Potassium 3.5 3.5 - 5.1 mmol/L Chloride 108 97 - 108 mmol/L  
 CO2 25 21 - 32 mmol/L Anion gap 8 5 - 15 mmol/L Glucose 153 (H) 65 - 100 mg/dL BUN 31 (H) 6 - 20 MG/DL Creatinine 0.88 0.70 - 1.30 MG/DL  
 BUN/Creatinine ratio 35 (H) 12 - 20 GFR est AA >60 >60 ml/min/1.73m2 GFR est non-AA >60 >60 ml/min/1.73m2 Calcium 8.2 (L) 8.5 - 10.1 MG/DL Bilirubin, total 0.5 0.2 - 1.0 MG/DL  
 ALT (SGPT) 34 12 - 78 U/L  
 AST (SGOT) 30 15 - 37 U/L Alk. phosphatase 71 45 - 117 U/L Protein, total 5.9 (L) 6.4 - 8.2 g/dL Albumin 2.2 (L) 3.5 - 5.0 g/dL Globulin 3.7 2.0 - 4.0 g/dL A-G Ratio 0.6 (L) 1.1 - 2.2 HGB & HCT Collection Time: 05/25/21  5:12 AM  
Result Value Ref Range HGB 11.7 (L) 12.1 - 17.0 g/dL HCT 35.7 (L) 36.6 - 50.3 % HGB & HCT Collection Time: 05/25/21 12:54 PM  
Result Value Ref Range HGB 12.2 12.1 - 17.0 g/dL HCT 37.5 36.6 - 50.3 % C REACTIVE PROTEIN, QT Collection Time: 05/25/21 12:54 PM  
Result Value Ref Range C-Reactive protein 5.05 (H) 0.00 - 0.60 mg/dL NT-PRO BNP Collection Time: 05/25/21 12:54 PM  
Result Value Ref Range  NT pro- (H) <125 PG/ML  
PROCALCITONIN Collection Time: 05/25/21 12:54 PM  
Result Value Ref Range Procalcitonin 0.11 ng/mL VITAMIN D, 25 HYDROXY Collection Time: 05/25/21 12:54 PM  
Result Value Ref Range Vitamin D 25-Hydroxy 34.9 30 - 100 ng/mL TYPE & SCREEN Collection Time: 05/25/21 12:54 PM  
Result Value Ref Range Crossmatch Expiration 05/28/2021,2359 ABO/Rh(D) O POSITIVE Antibody screen NEG   
D DIMER Collection Time: 05/25/21 12:54 PM  
Result Value Ref Range D-dimer 14.91 (H) 0.00 - 0.65 mg/L FEU Lab Results Component Value Date/Time D-dimer 14.91 (H) 05/25/2021 12:54 PM  
  
 
XRAY Result: 
Results from Hospital Encounter encounter on 05/22/21 XR CHEST PORT Narrative Clinical history: covid SOB INDICATION:   covid SOB 
COMPARISON: None FINDINGS: 
AP portable upright view of the chest demonstrates an enlarged cardiopericardial 
silhouette. There is no pleural effusion. .There is no focal 
consolidation. .Interstitial and parenchymal opacities. . Patient is on a cardiac 
monitor. Impression Scattered interstitial and parenchymal opacities consistent with multifocal 
pneumonia. Cardiomegaly CT Result: No results found for this or any previous visit. MEDS: Reviewed Chest Imaging: personally reviewed and report checked Tele- reviewed I have reviewed the flowsheet and previous day's notes Thank you for allowing me to participate in this patient's care. Gloria Meyer MD 
 
 
[facility]

## 2021-05-25 NOTE — PROGRESS NOTES
Reason for Admission:  admitted with complaints of shortness of breath and rectal bleeding. Currently undergoing treatment for multiple myeloma. Covid positive, however he received the J&J vaccine on 5/1 RUR Score:  18%-Low Plan for utilizing home health:  Pending progress, will likely discharge home with no needs. Will have PT/OT evaluate to determine level of care needed at discharge. Care Management Interventions PCP Verified by CM: Yes Last Visit to PCP: 05/18/21 Mode of Transport at Discharge: BLS Current Support Network: Lives with Spouse, Own Home The Patient and/or Patient Representative was Provided with a Choice of Provider and Agrees with the Discharge Plan?:  (Joann Can wife) [employer] Information Provided?: No 
Discharge Location Discharge Placement: Other: (Pending progress) Dotty Fowler RN CRM Ext [phone] PCP: First and Last name:  César Sher MD 
 
 Name of Practice:  
 Are you a current patient: Yes/No:  
 Approximate date of last visit:  
 Can you participate in a virtual visit with your PCP:  
                 
Current Advanced Directive/Advance Care Plan: Full Code Healthcare Decision Maker:  
Click here to complete [address] including selection of the Healthcare Decision Maker Relationship (ie \"Primary\") Primary Decision Maker: Shilpa Cross - Spouse - [phone] Transition of Care Plan:

## 2021-05-25 NOTE — PROGRESS NOTES
Bedside and Verbal shift change report given to Elba (oncoming nurse) by Evelyn Harris RN (offgoing nurse). Report included the following information SBAR, Kardex, MAR and Recent Results.

## 2021-05-25 NOTE — PROGRESS NOTES
Heme/ONC Pt is admitted with COVID PNA/ respiratory distress. Pt is seen and treated at LT[facility] within [facility] for MM. No records here. Will try to get records. Agree hold pomalyst. 
H/H stable Fu with [facility] at D/c Call if questions

## 2021-05-26 NOTE — PROGRESS NOTES
PULMONARY MEDICINE Progress Note Name: Mary Ho : 1945 MRN: [id number] Date: 2021 Subjective:  
 CRP 5.05 
probnp 446 
procal 0.11 
D dimer 14.32 Now on continuous NIV, desaturations off NIV IL6 1021 LE dopplers pending Vit d 34.9, ok O + blood type  On 60lpm and 100% Fio2; sats low 90s and upper 80s. Looks unlabored Consult Note:  Requesting Physician: Dr. Ash Prater Reason for consult: Resp failure Medical records and data reviewed. Patient is a 78 y.o. male who presented to the hospital with shortness of breath. Patient was seen in consultation for acute respiratory failure. He presented to the emergency room on the  complaining of rectal bleeding and increasing shortness of breath. He was found to be hypoxic in the Emergency Room placed on oxygen support. There is a history of positive covid test by home kit along with several members of his family also positive for Covid 19. Patient received [email or web address] vaccine approximately 2 weeks ago. Been evaluated earlier today, he was on BiPAP with high FiO2 and moderate work of breathing. Patient has been started on REM does abated and is on empiric steroids. Given progressive respiratory failure, order for echo was placed. Inflammatory markers are being monitored. D-dimer will be checked. He may be a candidate for ASPEN clinical trial if d-dimer is elevated. Information was provided to him and clinical study explained. Patient has underlying history of multiple myeloma with intermittent rectal bleeding. Hemoglobin is stable and he is receiving Lovenox prophylactically. Review of Systems: A comprehensive 12 system review of systems was limited from patient Assessment:  
 
Acute hypoxic respiratory failure Pneumonia due to Covid 19 virus with cytokine storm; s/p J&J vaccine approx 2 wks ago History of multiple myeloma Former smoker, 20-30 years. No formal dx of COPD Rectal bleeding with stable hemoglobin, tolerating Lovenox Other medical problems per chart Recommendations:  
 
O2 titration above 90%, on NIV and high flow Monitor d dimer, probnp and inflammatory markers Prone > 8 hours Lasix, increase to bid 
ok IgG subclasses Receiving dexamethasone; stop and start solu medrol 
rendesivir, stopped Transition proph lovenox to therapeutic lovenox s/p tocilizumab on 5/23 the Aspen clinical trial; per Dr Jamila Samano Patient is critically ill and at high risk for further decline, mechanical ventilation, and ICU admission due to acute hypoxic resp failure and COVID19 PNA ; CC time spent excluding procedures is > 35 minutes Discussed with patient, nurse, pharm, attending and hospitalist 
 
Active Problem List:  
 
Problem List  Never Reviewed Codes Class Acute hypoxemic respiratory failure (HCC) ICD-10-CM: J96.01 
ICD-9-CM: 518.81 Pneumonia due to COVID-19 virus ICD-10-CM: U07.1, J12.82 ICD-9-CM: 480.8, 079.89 Past Medical History:  
 
 has a past medical history of Hypertension and Multiple myeloma ([facility] [address] Past Surgical History:  
 
 has a past surgical history that includes hx cholecystectomy. Home Medications:  
 
Prior to Admission medications Medication Sig Start Date End Date Taking? Authorizing Provider FLUoxetine (PROzac) 40 mg capsule Take 40 mg by mouth daily. Yes Provider, Historical  
acyclovir (ZOVIRAX) 400 mg tablet Take 400 mg by mouth two (2) times a day. Yes Provider, Historical  
folic acid (FOLVITE) 1 mg tablet Take 1 mg by mouth daily. Yes Provider, Historical  
amLODIPine (NORVASC) 10 mg tablet Take 10 mg by mouth daily. Yes Provider, Historical  
lisinopril-hydroCHLOROthiazide (PRINZIDE, ZESTORETIC) 20-25 mg per tablet Take 1 Tablet by mouth daily. Yes Provider, Historical  
ferrous sulfate 325 mg (65 mg iron) tablet Take 325 mg by mouth three (3) times daily (with meals).    Yes Provider, Historical tamsulosin (Flomax) 0.4 mg capsule Take 0.4 mg by mouth daily. Yes Provider, Historical  
omeprazole (PRILOSEC) 40 mg capsule Take 40 mg by mouth daily. Yes Provider, Historical  
pomalidomide 3 mg cap Take 3 mg by mouth daily. He takes 3 mg daily for 21 days with 1 week off. Indications: multiple myeloma   Yes Provider, Historical  
predniSONE (DELTASONE) 10 mg tablet Take  by mouth daily (with breakfast). taper   Yes Provider, Historical  
doxycycline (MONODOX) 100 mg capsule Take 100 mg by mouth two (2) times a day. Yes Provider, Historical  
guaiFENesin (ROBITUSSIN) 100 mg/5 mL liquid Take 200 mg by mouth three (3) times daily as needed for Cough. Provider, Historical  
 
 
Allergies/Social/Family History: Allergies Allergen Reactions  Sulfa (Sulfonamide Antibiotics) Itching Social History Tobacco Use  Smoking status: Former Smoker  Smokeless tobacco: Never Used Substance Use Topics  Alcohol use: Not Currently History reviewed. No pertinent family history. Objective:  
Vital Signs: 
Visit Vitals /70 Pulse (!) 47 Temp 97.9 °F (36.6 °C) Resp 20 Ht 5' 11\" (1.803 m) Wt 102.4 kg (225 lb 12.8 oz) SpO2 98% BMI 31.49 kg/m² O2 Flow Rate (L/min): 60 l/min O2 Device: BIPAP Temp (24hrs), Av °F (36.7 °C), Min:97.3 °F (36.3 °C), Max:98.7 °F (37.1 °C) Intake/Output:  
 
Intake/Output Summary (Last 24 hours) at 2021 1116 Last data filed at 2021 0831 Gross per 24 hour Intake  Output 750 ml Net -750 ml Physical Exam:  
General:  Alert, cooperative, on bipap, tachypneic, appears stated age. Head:  Normocephalic, without obvious abnormality, atraumatic. Eyes:  Conjunctivae/corneas clear. PERRL Neck: Supple, symmetrical, no adenopathy, no carotid bruit and no JVD. Lungs:   Diminished BS Chest wall:  No tenderness or deformity.   
Heart:  Regular rate and rhythm, S1-S2 normal, no murmur, no click, rub or gallop. Abdomen:   Soft, non-tender. Bowel sounds present. No masses,  No organomegaly. Extremities: Atraumatic, no cyanosis or edema. Pulses: Palpable Skin: No rashes or lesions Neurologic: Grossly nonfocal  
 
  
 LABS AND  DATA: Personally reviewed Recent Labs  
  05/26/21 
0516 05/25/21 
1254 HGB 11.8* 12.2 HCT 36.1* 37.5 Recent Labs  
  05/25/21 
0512 05/24/21 
1611  143  
K 3.5 4.0  
 109* CO2 25 27 BUN 31* 29* CREA 0.88 0.80 * 101* CA 8.2* 8.1*  
MG  --  2.1 Recent Labs  
  05/25/21 
0512 05/24/21 
1611 AP 71 72  
TP 5.9* 5.9* ALB 2.2* 2.2*  
GLOB 3.7 3.7 No results for input(s): INR, PTP, APTT, INREXT, INREXT in the last 72 hours. No results for input(s): PHI, PCO2I, PO2I, FIO2I in the last 72 hours. No results for input(s): CPK, CKMB, TROIQ, BNPP in the last 72 hours. MEDS: Reviewed Chest Imaging: personally reviewed and report checked Tele- reviewed Medical decision making:  
I have reviewed the flowsheet and previous day's notes Patient has acute or chronic illness that poses a threat to life or bodily function Review and order of Clinical lab tests Review and Order of Radiology tests Independent visualization of Image Reviewed Oxygen/ NiPPV High Risk Drug therapy requiring intensive monitoring for toxicity: eg steroids, pressors, antibiotics CCT 75' Thank you for allowing me to participate in this patient's care. Estrella Estrella PA-C [facility]

## 2021-05-26 NOTE — PROGRESS NOTES
[address] [facility] Adult  Hospitalist Group Hospitalist Progress Note Carmen Bhagat MD 
Answering service: [phone] or [phone] from in house phone Date of Service:  2021 NAME:  Raegan Rendon :  1945 MRN:  [id number] This documentation was facilitated by a Voice Recognition software and may contain inadvertent typographical errors. Admission Summary: This is a 72-year-old gentleman who is currently undergoing treatment for multiple myeloma was brought to the ED for rectal bleed and shortness of breath. Upon arrival of EMS at his home, patient was found to be in respiratory distress and hypoxic with SPO2 in the 70s and he was put on NRB and brought to the ED. In the ED hospital on high flow oxygen. Interval history / Subjective:  
    
Patient is on continuous BiPAP now. He is alert and oriented and communicative. Assessment & Plan:  
Acute hypoxic respiratory failure Bilateral pneumonia likely Covid19 pneumonia Severe sepsis (presented with hypoxic respiratory failure, lactic acidosis, leukocytosis) secondary to bilateral pneumonia. 
-On continuous BiPAP now. 
-On dexamethasone. Continue empiric antibiotics. Received Actemra 
-Lovenox switched to full dose due to significant jump in D-dimer. 
-Stopped remdesivir due to worsening bradycardia. -Droplet plus and need for isolation 
-Monitor inflammatory and procoagulant factors Chronic intermittent rectal bleeding, he had an episode this morning. Per his wife he had chronic intermittent rectal bleed related to his cancer and chemotherapy. Rectal bleed improved with recent switch of his chemotherapy Pomalyst per his wife. -IV Protonix 
-No rectal bleeding since admission. Hemoglobin slightly down which could be due to hemodilution as he has received fluid in the ED is stable. Given high risk of VTE and complications in the setting of Covid ,reasonable to continue with prophylactic anticoagulation while closely watching for recurrence of rectal bleeding. If rectal bleeding recurs and/or hemoglobin drops significantly, we may have to stop prophylactic anticoagulation. 
-Hemoglobin remained stable. Bradycardia. Although  low heart rate started prior to initiation of remdesivir, heart rate is dipping as low as 30s today. Considering little/marginal benefits of Remdesevir in the setting of severe Covid requiring high flow/NPPV requirement, will discontinue remdesivir. -HR better. Hypokalemia: Replace and monitor History of multiple myeloma: On Pomalyst 3 mg daily for 21 days with 7 days off. Patient usually follows at [facility] on hold, oncology agreed. Hypertension, hold antihypertensives for now. Continue PTA acyclovir, folic acid, Prozac and tamsulosin. PT medication list obtained from family on the phone Patient's Baseline: Ambulates with walking DVT ppx: Lovenox per Covid protocol Code status:He reaffirmed he would like to remain full code. Disposition: TBD 
5/23: Called and update his wife of the events that his now is on BiPAP,he may be transferred to ICU. If the BIPAP fails,he will have to be intubated. Dejan Alas. Phone [phone] His wife is now admitted in this hospital, and updated her on his current state upon her request. 
 
Anticipated Discharge: Greater than 48 hours Hospital Problems  Never Reviewed Codes Class Noted POA Acute hypoxemic respiratory failure ([facility] [address] ICD-10-CM: J96.01 
ICD-9-CM: 518.81  5/22/2021 Unknown Pneumonia due to COVID-19 virus ICD-10-CM: U07.1, J12.82 ICD-9-CM: 480.8, 079.89  5/22/2021 Unknown Review of Systems: A comprehensive review of systems was negative except for that written in the HPI. Vital Signs:  
 Last 24hrs VS reviewed since prior progress note. Most recent are: 
Visit Vitals BP (!) 145/68 Pulse (!) 57 Temp 98.8 °F (37.1 °C) Resp 24 Ht 5' 11\" (1.803 m) Wt 102.4 kg (225 lb 12.8 oz) SpO2 100% BMI 31.49 kg/m² Intake/Output Summary (Last 24 hours) at 5/26/2021 1749 Last data filed at 5/26/2021 1542 Gross per 24 hour Intake  Output 1500 ml Net -1500 ml Physical Examination:  
 
I had a face to face encounter with this patient and independently examined them on5/26/2021 as outlined below: 
     
Constitutional:  Lying in bed, on BiPAP. Alert and communicative. HEENT:  Atraumatic. Oral mucosa moist,. Non icteric sclera. No pallor. Resp:  On BiPAP. Jennifer Akron Bilateral crepitations Chest Wall: No deformity CV:  Regular rhythm, normal rate, no murmurs, gallops, rubs GI:  Soft, non distended, non tender. normoactive bowel sounds, no hepatosplenomegaly :  No CVA or suprapubic tenderness Musculoskeletal:  No edema, warm, 2+ pulses throughout Neurologic:  Mental status:AAOx4. Cranial nerves II-XII : WNL Motor exam:Moves all extremities symmetrically Data Review:  
 Review and/or order of clinical lab test 
Review and/or order of tests in the radiology section of CPT Review and/or order of tests in the medicine section of CPT Labs:  
 
Recent Labs  
  05/26/21 
0516 05/25/21 
1254 HGB 11.8* 12.2 HCT 36.1* 37.5 Recent Labs  
  05/26/21 
0516 05/25/21 
0512 05/24/21 
1611  141 143  
K 3.9 3.5 4.0  
 108 109* CO2 25 25 27 BUN 29* 31* 29* CREA 0.87 0.88 0.80 * 153* 101* CA 8.1* 8.2* 8.1*  
MG  --   --  2.1 Recent Labs  
  05/26/21 
0516 05/25/21 
0512 05/24/21 
1611 ALT 39 34 39 AP 72 71 72 TBILI 0.5 0.5 0.5 TP 6.0* 5.9* 5.9* ALB 2.3* 2.2* 2.2*  
GLOB 3.7 3.7 3.7 No results for input(s): INR, PTP, APTT, INREXT, INREXT in the last 72 hours. No results for input(s): FE, TIBC, PSAT, FERR in the last 72 hours. No results found for: FOL, RBCF No results for input(s): PH, PCO2, PO2 in the last 72 hours.  
No results for input(s): CPK, CKNDX, TROIQ in the last 72 hours. No lab exists for component: CPKMB No results found for: CHOL, CHOLX, CHLST, CHOLV, HDL, HDLP, LDL, LDLC, DLDLP, TGLX, TRIGL, TRIGP, CHHD, CHHDX No results found for: Zaria Hoffman No results found for: COLOR, APPRN, SPGRU, REFSG, BRYANNA, PROTU, GLUCU, KETU, BILU, UROU, DANNY, LEUKU, GLUKE, EPSU, BACTU, WBCU, RBCU, CASTS, UCRY Medications Reviewed:  
 
Current Facility-Administered Medications Medication Dose Route Frequency  furosemide (LASIX) injection 20 mg  20 mg IntraVENous BID  methylPREDNISolone (PF) (SOLU-MEDROL) injection 60 mg  60 mg IntraVENous Q6H  
 enoxaparin (LOVENOX) injection 100 mg  100 mg SubCUTAneous Q12H  
 fluticasone propionate (FLONASE) 50 mcg/actuation nasal spray 2 Spray  2 Spray Both Nostrils QHS  sodium chloride (OCEAN) 0.65 % nasal squeeze bottle 2 Spray  2 Spray Both Nostrils Q2H PRN  pantoprazole (PROTONIX) 40 mg in 0.9% sodium chloride 10 mL injection  40 mg IntraVENous Q24H  
 sodium chloride (NS) flush 5-40 mL  5-40 mL IntraVENous Q8H  
 sodium chloride (NS) flush 5-40 mL  5-40 mL IntraVENous PRN  
 sodium chloride (NS) flush 5-40 mL  5-40 mL IntraVENous Q8H  
 sodium chloride (NS) flush 5-40 mL  5-40 mL IntraVENous PRN  
 acetaminophen (TYLENOL) tablet 650 mg  650 mg Oral Q6H PRN Or  
 acetaminophen (TYLENOL) suppository 650 mg  650 mg Rectal Q6H PRN  polyethylene glycol (MIRALAX) packet 17 g  17 g Oral DAILY PRN  promethazine (PHENERGAN) tablet 12.5 mg  12.5 mg Oral Q6H PRN Or  
 ondansetron (ZOFRAN) injection 4 mg  4 mg IntraVENous Q6H PRN  
 acyclovir (ZOVIRAX) tablet 400 mg  400 mg Oral BID  ferrous sulfate tablet 325 mg  1 Tablet Oral TID WITH MEALS  
 FLUoxetine (PROzac) capsule 40 mg  40 mg Oral DAILY  folic acid (FOLVITE) tablet 1 mg  1 mg Oral DAILY  tamsulosin (FLOMAX) capsule 0.4 mg  0.4 mg Oral DAILY ______________________________________________________________________ EXPECTED LENGTH OF STAY: 4d 19h ACTUAL LENGTH OF STAY:          4 Le Diamond MD

## 2021-05-26 NOTE — PROGRESS NOTES
Problem: Risk for Spread of Infection Goal: Prevent transmission of infectious organism to others Description: Prevent the transmission of infectious organisms to other patients, staff members, and visitors. Outcome: Progressing Towards Goal 
Note: Drop + precautions Problem: Falls - Risk of 
Goal: *Absence of Falls Description: Document Janes De Luna Fall Risk and appropriate interventions in the flowsheet. Outcome: Progressing Towards Goal 
Note: Fall Risk Interventions: 
Mobility Interventions: Patient to call before getting OOB Medication Interventions: Evaluate medications/consider consulting pharmacy, Patient to call before getting OOB Elimination Interventions: Call light in reach, Urinal in reach, Toileting schedule/hourly rounds, Toilet paper/wipes in reach

## 2021-05-26 NOTE — PROGRESS NOTES
Bedside and Verbal shift change report given to [address] (oncoming nurse) by Goran Unger RN (offgoing nurse). Report included the following information SBAR, Kardex, MAR and Recent Results.

## 2021-05-27 NOTE — PROGRESS NOTES
[address] [facility] Adult  Hospitalist Group Hospitalist Progress Note Luis Oakley MD 
Answering service: [phone] OR [phone] from in house phone Date of Service:  2021 NAME:  Abdirizak Dietz :  1945 MRN:  [id number] Admission Summary: This is a 53-year-old gentleman who is currently undergoing treatment for multiple myeloma was brought to the ED for rectal bleed and shortness of breath. Upon arrival of EMS at his home, patient was found to be in respiratory distress and hypoxic with SPO2 in the 70s and he was put on NRB and brought to the ED. In the ED hospital on high flow oxygen. Interval history / Subjective:  
   
Patient remains on continuous BiPAP. He is alert and oriented and communicative. No overnight events. NAD Assessment & Plan:  
Acute hypoxic respiratory failure Bilateral pneumonia likely Covid19 pneumonia Severe sepsis (presented with hypoxic respiratory failure, lactic acidosis, leukocytosis) secondary to bilateral pneumonia. 
-On continuous BiPAP, wean as able 
-On dexamethasone. Continue empiric antibiotics. Received Actemra 
-Lovenox switched to empiric full dose due to increased suspicion for VTE 
-Stopped remdesivir due to worsening bradycardia 
-Monitor inflammatory and procoagulant factors 
-lasix -appreciate pulm 
-albuterol Chronic intermittent rectal bleeding related to his cancer and chemotherapy. Rectal bleed improved with recent switch of his chemotherapy Pomalyst per his wife. -IV Protonix 
-No rectal bleeding since admission 
-hgb stable, monitor Bradycardia. Improved after stopping remdesivir Hypokalemia: Replace and monitor History of multiple myeloma: On Pomalyst 3 mg daily for 21 days with 7 days off. Patient usually follows at [facility] on hold, oncology agreed.  
 
Hypertension, overall controlled, holding home antihypertensives for now Continue PTA acyclovir, folic acid, Prozac and tamsulosin Patient's Baseline: Ambulates with walking DVT ppx: Lovenox tx Code status: full Disposition: TBD 
wife Jenise Forbes [phone], now admitted here for similar, unfortunately per bed placement they cannot be in same room Anticipated Discharge: Greater than 48 hours Hospital Problems  Never Reviewed Codes Class Noted POA Acute hypoxemic respiratory failure ([facility] [address] ICD-10-CM: J96.01 
ICD-9-CM: 518.81  5/22/2021 Unknown Pneumonia due to COVID-19 virus ICD-10-CM: U07.1, J12.82 ICD-9-CM: 480.8, 079.89  5/22/2021 Unknown Review of Systems: A comprehensive review of systems was negative except for that written in the HPI. Vital Signs:  
 Last 24hrs VS reviewed since prior progress note. Most recent are: 
Visit Vitals /69 (BP 1 Location: Right upper arm, BP Patient Position: At rest) Pulse 64 Temp 98.3 °F (36.8 °C) Resp 26 Ht 5' 11\" (1.803 m) Wt 99.8 kg (220 lb) SpO2 99% BMI 30.68 kg/m² Intake/Output Summary (Last 24 hours) at 5/27/2021 1758 Last data filed at 5/27/2021 3106 Gross per 24 hour Intake  Output 1535 ml Net -1535 ml Physical Examination:  
 
I had a face to face encounter with this patient and independently examined them on5/27/2021 as outlined below: 
     
Constitutional:  Lying in bed, on BiPAP. Alert HEENT:  Atraumatic. Oral mucosa moist,. Non icteric sclera. No pallor. Resp:  On BiPAP. Vonzella Goodpasture coarse b/l CV:  Regular rhythm GI:  Soft, non distended, non tender Musculoskeletal:  No edema, warm Neurologic:  REZA, A&O Data Review:  
 Review and/or order of clinical lab test 
Review and/or order of tests in the radiology section of CPT Review and/or order of tests in the medicine section of CPT Labs:  
 
Recent Labs  
  05/27/21 
0414 05/26/21 
8818 HGB 13.1 11.8* HCT 40.0 36.1* Recent Labs  
  05/27/21 8973 05/26/21 
6219 05/25/21 
4295  140 141  
K 3.7 3.9 3.5  107 108 CO2 29 25 25 BUN 31* 29* 31* CREA 0.90 0.87 0.88 * 124* 153* CA 8.5 8.1* 8.2* Recent Labs  
  05/27/21 
0414 05/26/21 
0516 05/25/21 
9347 ALT 55 39 34 AP 72 72 71 TBILI 0.5 0.5 0.5 TP 6.3* 6.0* 5.9* ALB 2.4* 2.3* 2.2*  
GLOB 3.9 3.7 3.7 No results for input(s): INR, PTP, APTT, INREXT, INREXT in the last 72 hours. No results for input(s): FE, TIBC, PSAT, FERR in the last 72 hours. No results found for: FOL, RBCF No results for input(s): PH, PCO2, PO2 in the last 72 hours. No results for input(s): CPK, CKNDX, TROIQ in the last 72 hours. No lab exists for component: CPKMB No results found for: CHOL, CHOLX, CHLST, CHOLV, HDL, HDLP, LDL, LDLC, DLDLP, TGLX, TRIGL, TRIGP, CHHD, CHHDX No results found for: [facility] No results found for: COLOR, APPRN, SPGRU, REFSG, BRYANNA, PROTU, GLUCU, KETU, BILU, UROU, DANNY, LEUKU, GLUKE, EPSU, BACTU, WBCU, RBCU, CASTS, UCRY Medications Reviewed:  
 
Current Facility-Administered Medications Medication Dose Route Frequency  albuterol (PROVENTIL HFA, VENTOLIN HFA, PROAIR HFA) inhaler 2 Puff  2 Puff Inhalation TID RT  
 furosemide (LASIX) injection 20 mg  20 mg IntraVENous BID  methylPREDNISolone (PF) (SOLU-MEDROL) injection 60 mg  60 mg IntraVENous Q6H  
 enoxaparin (LOVENOX) injection 100 mg  100 mg SubCUTAneous Q12H  
 fluticasone propionate (FLONASE) 50 mcg/actuation nasal spray 2 Spray  2 Spray Both Nostrils QHS  sodium chloride (OCEAN) 0.65 % nasal squeeze bottle 2 Spray  2 Spray Both Nostrils Q2H PRN  pantoprazole (PROTONIX) 40 mg in 0.9% sodium chloride 10 mL injection  40 mg IntraVENous Q24H  
 sodium chloride (NS) flush 5-40 mL  5-40 mL IntraVENous Q8H  
 sodium chloride (NS) flush 5-40 mL  5-40 mL IntraVENous PRN  
 sodium chloride (NS) flush 5-40 mL  5-40 mL IntraVENous Q8H  
 sodium chloride (NS) flush 5-40 mL  5-40 mL IntraVENous PRN  
 acetaminophen (TYLENOL) tablet 650 mg  650 mg Oral Q6H PRN Or  
 acetaminophen (TYLENOL) suppository 650 mg  650 mg Rectal Q6H PRN  polyethylene glycol (MIRALAX) packet 17 g  17 g Oral DAILY PRN  promethazine (PHENERGAN) tablet 12.5 mg  12.5 mg Oral Q6H PRN Or  
 ondansetron (ZOFRAN) injection 4 mg  4 mg IntraVENous Q6H PRN  
 acyclovir (ZOVIRAX) tablet 400 mg  400 mg Oral BID  ferrous sulfate tablet 325 mg  1 Tablet Oral TID WITH MEALS  
 FLUoxetine (PROzac) capsule 40 mg  40 mg Oral DAILY  folic acid (FOLVITE) tablet 1 mg  1 mg Oral DAILY  tamsulosin (FLOMAX) capsule 0.4 mg  0.4 mg Oral DAILY  
 
______________________________________________________________________ EXPECTED LENGTH OF STAY: 4d 19h ACTUAL LENGTH OF STAY:          5 Cristhian Chauhan MD

## 2021-05-27 NOTE — PROGRESS NOTES
Problem: Risk for Spread of Infection Goal: Prevent transmission of infectious organism to others Description: Prevent the transmission of infectious organisms to other patients, staff members, and visitors. Outcome: Progressing Towards Goal 
  
Problem: Breathing Pattern - Ineffective Goal: *Use of effective breathing techniques Outcome: Progressing Towards Goal 
  
Problem: Airway Clearance - Ineffective Goal: Achieve or maintain patent airway Outcome: Progressing Towards Goal 
  
Problem: Gas Exchange - Impaired Goal: Promote optimal lung function Outcome: Progressing Towards Goal 
  
Problem: Body Temperature -  Risk of, Imbalanced Goal: Ability to maintain a body temperature within defined limits Outcome: Progressing Towards Goal

## 2021-05-27 NOTE — PROGRESS NOTES
1955: Bedside shift change report given to Giulia Sanz RN (oncoming nurse) by Shola Jay RN (offgoing nurse).  Report included the following information SBAR, Intake/Output, MAR, Recent Results and Cardiac Rhythm SB.

## 2021-05-27 NOTE — PROGRESS NOTES
PULMONARY MEDICINE Progress Note Name: Sheyla Guevara : 1945 MRN: [id number] Date: 2021 Subjective:  
 
 On 60lpm and 95% Fio2; saturations hanging around 90% No WOB; he feels a little better today D dimer 17.31 
CRP 1.66, down 
probnp 366, down  CRP 5.05 
probnp 446 
procal 0.11 
D dimer 14.32 Now on continuous NIV, desaturations off NIV IL6 1021 LE dopplers pending Vit d 34.9, ok O + blood type  On 60lpm and 100% Fio2; sats low 90s and upper 80s. Looks unlabored Consult Note:  Requesting Physician: Dr. Ramila Akers Reason for consult: Resp failure Medical records and data reviewed. Patient is a 78 y.o. male who presented to the hospital with shortness of breath. Patient was seen in consultation for acute respiratory failure. He presented to the emergency room on the  complaining of rectal bleeding and increasing shortness of breath. He was found to be hypoxic in the Emergency Room placed on oxygen support. There is a history of positive covid test by home kit along with several members of his family also positive for Covid 19. Patient received [employer] vaccine approximately 2 weeks ago. Been evaluated earlier today, he was on BiPAP with high FiO2 and moderate work of breathing. Patient has been started on REM does abated and is on empiric steroids. Given progressive respiratory failure, order for echo was placed. Inflammatory markers are being monitored. D-dimer will be checked. He may be a candidate for ASPEN clinical trial if d-dimer is elevated. Information was provided to him and clinical study explained. Patient has underlying history of multiple myeloma with intermittent rectal bleeding. Hemoglobin is stable and he is receiving Lovenox prophylactically. Review of Systems: A comprehensive 12 system review of systems was limited from patient Assessment:  
 
Acute hypoxic respiratory failure Pneumonia due to Covid 19 virus with cytokine storm; s/p J&J vaccine approx 2 wks ago History of multiple myeloma Former smoker, 20-30 years. No formal dx of COPD Rectal bleeding with stable hemoglobin, tolerating Lovenox Other medical problems per chart Recommendations:  
 
O2 titration above 90%, on NIV and high flow Monitor d dimer, probnp and inflammatory markers Prone > 8 hours Lasix, increase to bid; hold for now  
ok IgG subclasses  
start solu medrol 
rendesivir, stopped  
therapeutic lovenox for now. If we can get his flow/FiO2 requirements down soon we can get a CTA  
s/p tocilizumab on 5/23 the Aspen clinical trial; per Dr Kellie Barrera Patient is critically ill and at high risk for further decline, mechanical ventilation, and ICU admission due to acute hypoxic resp failure and COVID19 PNA ; CC time spent excluding procedures is > 35 minutes Discussed with patient, nurse Active Problem List:  
 
Problem List  Never Reviewed Codes Class Acute hypoxemic respiratory failure (HCC) ICD-10-CM: J96.01 
ICD-9-CM: 518.81 Pneumonia due to COVID-19 virus ICD-10-CM: U07.1, J12.82 ICD-9-CM: 480.8, 079.89 Past Medical History:  
 
 has a past medical history of Hypertension and Multiple myeloma ([facility] [address] Past Surgical History:  
 
 has a past surgical history that includes hx cholecystectomy. Home Medications:  
 
Prior to Admission medications Medication Sig Start Date End Date Taking? Authorizing Provider FLUoxetine (PROzac) 40 mg capsule Take 40 mg by mouth daily. Yes Provider, Historical  
acyclovir (ZOVIRAX) 400 mg tablet Take 400 mg by mouth two (2) times a day. Yes Provider, Historical  
folic acid (FOLVITE) 1 mg tablet Take 1 mg by mouth daily. Yes Provider, Historical  
amLODIPine (NORVASC) 10 mg tablet Take 10 mg by mouth daily. Yes Provider, Historical  
lisinopril-hydroCHLOROthiazide (PRINZIDE, ZESTORETIC) 20-25 mg per tablet Take 1 Tablet by mouth daily.    Yes Provider, Historical  
ferrous sulfate 325 mg (65 mg iron) tablet Take 325 mg by mouth three (3) times daily (with meals). Yes Provider, Historical  
tamsulosin (Flomax) 0.4 mg capsule Take 0.4 mg by mouth daily. Yes Provider, Historical  
omeprazole (PRILOSEC) 40 mg capsule Take 40 mg by mouth daily. Yes Provider, Historical  
pomalidomide 3 mg cap Take 3 mg by mouth daily. He takes 3 mg daily for 21 days with 1 week off. Indications: multiple myeloma   Yes Provider, Historical  
predniSONE (DELTASONE) 10 mg tablet Take  by mouth daily (with breakfast). taper   Yes Provider, Historical  
doxycycline (MONODOX) 100 mg capsule Take 100 mg by mouth two (2) times a day. Yes Provider, Historical  
guaiFENesin (ROBITUSSIN) 100 mg/5 mL liquid Take 200 mg by mouth three (3) times daily as needed for Cough. Provider, Historical  
 
 
Allergies/Social/Family History: Allergies Allergen Reactions  Sulfa (Sulfonamide Antibiotics) Itching Social History Tobacco Use  Smoking status: Former Smoker  Smokeless tobacco: Never Used Substance Use Topics  Alcohol use: Not Currently History reviewed. No pertinent family history. Objective:  
Vital Signs: 
Visit Vitals BP (!) 155/117 Pulse 76 Temp 97.9 °F (36.6 °C) Resp 20 Ht 5' 11\" (1.803 m) Wt 99.8 kg (220 lb) SpO2 100% BMI 30.68 kg/m² O2 Flow Rate (L/min): 60 l/min O2 Device: BIPAP Temp (24hrs), Av.2 °F (36.8 °C), Min:97.7 °F (36.5 °C), Max:98.8 °F (37.1 °C) Intake/Output:  
 
Intake/Output Summary (Last 24 hours) at 2021 1048 Last data filed at 2021 5110 Gross per 24 hour Intake  Output 975 ml Net -975 ml Physical Exam:  
General:  Alert, cooperative, on bipap, tachypneic, appears stated age. Head:  Normocephalic, without obvious abnormality, atraumatic. Eyes:  Conjunctivae/corneas clear. PERRL Neck: Supple, symmetrical, no adenopathy, no carotid bruit and no JVD.   
Lungs: Diminished BS Chest wall:  No tenderness or deformity. Heart:  Regular rate and rhythm, S1-S2 normal, no murmur, no click, rub or gallop. Abdomen:   Soft, non-tender. Bowel sounds present. No masses,  No organomegaly. Extremities: Atraumatic, no cyanosis or edema. Pulses: Palpable Skin: No rashes or lesions Neurologic: Grossly nonfocal  
 
  
 LABS AND  DATA: Personally reviewed Recent Labs  
  05/27/21 0414 05/26/21 
0516 HGB 13.1 11.8* HCT 40.0 36.1* Recent Labs  
  05/27/21 0414 05/26/21 
0516 05/24/21 
1611  140 143  
K 3.7 3.9 4.0  
 107 109* CO2 29 25 27 BUN 31* 29* 29* CREA 0.90 0.87 0.80 * 124* 101* CA 8.5 8.1* 8.1*  
MG  --   --  2.1 Recent Labs  
  05/27/21 0414 05/26/21 
0516 AP 72 72  
TP 6.3* 6.0* ALB 2.4* 2.3*  
GLOB 3.9 3.7 No results for input(s): INR, PTP, APTT, INREXT, INREXT in the last 72 hours. No results for input(s): PHI, PCO2I, PO2I, FIO2I in the last 72 hours. No results for input(s): CPK, CKMB, TROIQ, BNPP in the last 72 hours. MEDS: Reviewed Chest Imaging: personally reviewed and report checked Tele- reviewed Medical decision making:  
I have reviewed the flowsheet and previous day's notes Patient has acute or chronic illness that poses a threat to life or bodily function Review and order of Clinical lab tests Review and Order of Radiology tests Independent visualization of Image Reviewed Oxygen/ NiPPV High Risk Drug therapy requiring intensive monitoring for toxicity: eg steroids, pressors, antibiotics CCT 75' Thank you for allowing me to participate in this patient's care. Christoph Ferguson PA-C [facility]

## 2021-05-27 NOTE — PROGRESS NOTES
1400: Pt transferred to negative pressure room. Attempted to turn pt prone with HF, with assistance by URIEL CARROLL [facility], RRT. Did not tolerate well. Became increasingly anxious and SOB. Returned to back and BIPAP. Became calm and resps became regular and non-labored. O2 sat 98% prior to my leaving the room.

## 2021-05-27 NOTE — PROGRESS NOTES
Bedside and Verbal shift change report given to [facility] Stephan Rodríguez (oncoming nurse) by Carlos Manuel Mitchell (offgoing nurse).  Report included the following information SBAR, Kardex, MAR, Recent Results and Cardiac Rhythm SB.

## 2021-05-28 NOTE — PROGRESS NOTES
PULMONARY MEDICINE Progress Note Name: Vandana Batistachdionisio : 1945 MRN: [id number] Date: 2021 Subjective:  
 
 Back on continuous NIV due to WOB and hypoxemia D dimer trending down to 14.11 Feels ok, but no real improvement per patient  On 60lpm and 95% Fio2; saturations hanging around 90% No WOB; he feels a little better today D dimer 17.31 
CRP 1.66, down 
probnp 366, down  CRP 5.05 
probnp 446 
procal 0.11 
D dimer 14.32 Now on continuous NIV, desaturations off NIV IL6 1021 LE dopplers pending Vit d 34.9, ok O + blood type  On 60lpm and 100% Fio2; sats low 90s and upper 80s. Looks unlabored Consult Note:  Requesting Physician: Dr. Jameel Whitfield Reason for consult: Resp failure Medical records and data reviewed. Patient is a 78 y.o. male who presented to the hospital with shortness of breath. Patient was seen in consultation for acute respiratory failure. He presented to the emergency room on the  complaining of rectal bleeding and increasing shortness of breath. He was found to be hypoxic in the Emergency Room placed on oxygen support. There is a history of positive covid test by home kit along with several members of his family also positive for Covid 19. Patient received [employer] vaccine approximately 2 weeks ago. Been evaluated earlier today, he was on BiPAP with high FiO2 and moderate work of breathing. Patient has been started on REM does abated and is on empiric steroids. Given progressive respiratory failure, order for echo was placed. Inflammatory markers are being monitored. D-dimer will be checked. He may be a candidate for ASPEN clinical trial if d-dimer is elevated. Information was provided to him and clinical study explained. Patient has underlying history of multiple myeloma with intermittent rectal bleeding. Hemoglobin is stable and he is receiving Lovenox prophylactically. Review of Systems: A comprehensive 12 system review of systems was limited from patient Assessment:  
 
Acute hypoxic respiratory failure Pneumonia due to Covid 19 virus with cytokine storm; s/p J&J vaccine approx 2 wks ago History of multiple myeloma Former smoker, 20-30 years. No formal dx of COPD Rectal bleeding with stable hemoglobin, tolerating Lovenox Other medical problems per chart Recommendations:  
 
O2 titration above 90%, on NIV and high flow Monitor d dimer, probnp and inflammatory markers Prone > 8 hours Lasix, increase to bid 
ok IgG subclasses Solu medrol; Continue until we make some progress  
rendesivir, stopped Chest x-ray today  
therapeutic lovenox for now. If we can get his flow/FiO2 requirements down soon we can get a CTA  
s/p tocilizumab on 5/23 the Aspen clinical trial; per Dr Soren Melgar over the weekend Patient is critically ill and at high risk for further decline, mechanical ventilation, and ICU admission due to acute hypoxic resp failure and COVID19 PNA ; CC time spent excluding procedures is > 35 minutes Discussed with patient Active Problem List:  
 
Problem List  Never Reviewed Codes Class Acute hypoxemic respiratory failure (HCC) ICD-10-CM: J96.01 
ICD-9-CM: 518.81 Pneumonia due to COVID-19 virus ICD-10-CM: U07.1, J12.82 ICD-9-CM: 480.8, 079.89 Past Medical History:  
 
 has a past medical history of Hypertension and Multiple myeloma ([facility] [address] Past Surgical History:  
 
 has a past surgical history that includes hx cholecystectomy. Home Medications:  
 
Prior to Admission medications Medication Sig Start Date End Date Taking? Authorizing Provider FLUoxetine (PROzac) 40 mg capsule Take 40 mg by mouth daily. Yes Provider, Historical  
acyclovir (ZOVIRAX) 400 mg tablet Take 400 mg by mouth two (2) times a day. Yes Provider, Historical  
folic acid (FOLVITE) 1 mg tablet Take 1 mg by mouth daily.    Yes Provider, Historical amLODIPine (NORVASC) 10 mg tablet Take 10 mg by mouth daily. Yes Provider, Historical  
lisinopril-hydroCHLOROthiazide (PRINZIDE, ZESTORETIC) 20-25 mg per tablet Take 1 Tablet by mouth daily. Yes Provider, Historical  
ferrous sulfate 325 mg (65 mg iron) tablet Take 325 mg by mouth three (3) times daily (with meals). Yes Provider, Historical  
tamsulosin (Flomax) 0.4 mg capsule Take 0.4 mg by mouth daily. Yes Provider, Historical  
omeprazole (PRILOSEC) 40 mg capsule Take 40 mg by mouth daily. Yes Provider, Historical  
pomalidomide 3 mg cap Take 3 mg by mouth daily. He takes 3 mg daily for 21 days with 1 week off. Indications: multiple myeloma   Yes Provider, Historical  
predniSONE (DELTASONE) 10 mg tablet Take  by mouth daily (with breakfast). taper   Yes Provider, Historical  
doxycycline (MONODOX) 100 mg capsule Take 100 mg by mouth two (2) times a day. Yes Provider, Historical  
guaiFENesin (ROBITUSSIN) 100 mg/5 mL liquid Take 200 mg by mouth three (3) times daily as needed for Cough. Provider, Historical  
 
 
Allergies/Social/Family History: Allergies Allergen Reactions  Sulfa (Sulfonamide Antibiotics) Itching Social History Tobacco Use  Smoking status: Former Smoker  Smokeless tobacco: Never Used Substance Use Topics  Alcohol use: Not Currently History reviewed. No pertinent family history. Objective:  
Vital Signs: 
Visit Vitals BP (!) 162/86 (BP 1 Location: Right upper arm, BP Patient Position: At rest) Pulse (!) 56 Temp 97.6 °F (36.4 °C) Resp 17 Ht 5' 11\" (1.803 m) Wt 99.9 kg (220 lb 3.2 oz) SpO2 99% BMI 30.71 kg/m² O2 Flow Rate (L/min): 60 l/min O2 Device: BIPAP Temp (24hrs), Av.9 °F (36.6 °C), Min:97.4 °F (36.3 °C), Max:98.4 °F (36.9 °C) Intake/Output:  
 
Intake/Output Summary (Last 24 hours) at 2021 0915 Last data filed at 2021 Gross per 24 hour Intake  Output 1885 ml Net -1885 ml Physical Exam:  
General:  Alert, cooperative, on bipap, tachypneic, appears stated age. Head:  Normocephalic, without obvious abnormality, atraumatic. Eyes:  Conjunctivae/corneas clear. PERRL Neck: Supple, symmetrical, no adenopathy, no carotid bruit and no JVD. Lungs:   Diminished BS Chest wall:  No tenderness or deformity. Heart:  Regular rate and rhythm, S1-S2 normal, no murmur, no click, rub or gallop. Abdomen:   Soft, non-tender. Bowel sounds present. No masses,  No organomegaly. Extremities: Atraumatic, no cyanosis or edema. Pulses: Palpable Skin: No rashes or lesions Neurologic: Grossly nonfocal  
 
  
 LABS AND  DATA: Personally reviewed Recent Labs  
  05/28/21 
0508 05/27/21 
0414 WBC 23.7*  --   
HGB 12.7 13.1 HCT 38.7 40.0   --   
 
Recent Labs  
  05/28/21 
0508 05/27/21 0414 05/26/21 
0516 NA  --  141 140 K  --  3.7 3.9 CL  --  103 107 CO2  --  29 25 BUN  --  31* 29* CREA  --  0.90 0.87 GLU  --  143* 124* CA  --  8.5 8.1*  
MG 2.2  --   --   
 
Recent Labs  
  05/27/21 0414 05/26/21 
0516 AP 72 72  
TP 6.3* 6.0* ALB 2.4* 2.3*  
GLOB 3.9 3.7 No results for input(s): INR, PTP, APTT, INREXT, INREXT in the last 72 hours. No results for input(s): PHI, PCO2I, PO2I, FIO2I in the last 72 hours. No results for input(s): CPK, CKMB, TROIQ, BNPP in the last 72 hours. MEDS: Reviewed Chest Imaging: personally reviewed and report checked Tele- reviewed Medical decision making:  
I have reviewed the flowsheet and previous day's notes Patient has acute or chronic illness that poses a threat to life or bodily function Review and order of Clinical lab tests Review and Order of Radiology tests Independent visualization of Image Reviewed Oxygen/ NiPPV High Risk Drug therapy requiring intensive monitoring for toxicity: eg steroids, pressors, antibiotics CCT 75' Thank you for allowing me to participate in this patient's care. Hema Mai PA-C [facility]

## 2021-05-28 NOTE — PROGRESS NOTES
Bedside shift change report given to Jw Martins RN (oncoming nurse) by Ignacio Valentin RN (offgoing nurse). Report included the following information SBAR, Kardex, ED Summary, Procedure Summary, MAR, Recent Results, and Med Rec Status, sinus rhythm sinus edith. Hourly rounding done, pt in sinus edith, on continuous bipap, O2 saturation greater than 95%, using urinal at bedside, no complaints of pain throughout shift, call bell within reach, use explained to pt Problem: Risk for Spread of Infection Goal: Prevent transmission of infectious organism to others Description: Prevent the transmission of infectious organisms to other patients, staff members, and visitors. Outcome: Progressing Towards Goal 
  
Problem: Breathing Pattern - Ineffective Goal: *Absence of hypoxia Outcome: Progressing Towards Goal 
  
Problem: Airway Clearance - Ineffective Goal: Achieve or maintain patent airway Outcome: Progressing Towards Goal 
  
Problem: Gas Exchange - Impaired Goal: Absence of hypoxia Outcome: Progressing Towards Goal 
  
Problem: Breathing Pattern - Ineffective Goal: Ability to achieve and maintain a regular respiratory rate Outcome: Progressing Towards Goal 
  
Problem: Body Temperature -  Risk of, Imbalanced Goal: Ability to maintain a body temperature within defined limits Outcome: Progressing Towards Goal 
  
Problem: Isolation Precautions - Risk of Spread of Infection Goal: Prevent transmission of infectious organism to others Outcome: Progressing Towards Goal 
  
Problem: Nutrition Deficits Goal: Optimize nutrtional status Outcome: Progressing Towards Goal 
  
Problem: Risk for Fluid Volume Deficit Goal: Maintain normal heart rhythm Outcome: Progressing Towards Goal

## 2021-05-28 NOTE — PROGRESS NOTES
[facility] Adult  Hospitalist Group Hospitalist Progress Note Cristhian Chauhan MD 
Answering service: [phone] OR [phone] from in house phone Date of Service:  2021 NAME:  Sheridan Jorge :  1945 MRN:  [id number] Admission Summary: This is a 44-year-old gentleman who is currently undergoing treatment for multiple myeloma was brought to the ED for rectal bleed and shortness of breath. Upon arrival of EMS at his home, patient was found to be in respiratory distress and hypoxic with SPO2 in the 70s and he was put on NRB and brought to the ED. In the ED hospital on high flow oxygen. Interval history / Subjective:  
   
Patient remains on continuous BiPAP. He is alert and oriented and communicative. No overnight events. NAD. Feels maybe slightly better than yesterday Assessment & Plan:  
Acute hypoxic respiratory failure Bilateral Covid19 pneumonia Severe sepsis (presented with hypoxic respiratory failure, lactic acidosis, leukocytosis) secondary to bilateral pneumonia 
-On continuous BiPAP, wean as able 
-On dexamethasone. s/p empiric antibiotics. Received Actemra 
-Lovenox switched to empiric full dose due to increased suspicion for VTE 
-Stopped remdesivir due to worsening bradycardia 
-CRP, ddimer trending down 
-lasix bid -appreciate pulm 
-albuterol Chronic intermittent rectal bleeding related to his cancer and chemotherapy. Rectal bleed improved with recent switch of his chemotherapy Pomalyst per his wife. -IV Protonix 
-No rectal bleeding since admission 
-hgb stable, monitor Bradycardia. Improved after stopping remdesivir Hypokalemia: Replaced Mag ok History of multiple myeloma: On Pomalyst 3 mg daily for 21 days with 7 days off. Patient usually follows at [facility] on hold, oncology agreed.  
 
Hypertension, overall controlled, holding home antihypertensives for now Continue PTA acyclovir, folic acid, Prozac and tamsulosin Patient's Baseline: Ambulates with walking DVT ppx: Lovenox tx Code status: full Disposition: TBD 
wife Melanie Bailey [phone], now admitted here for similar, unfortunately per bed placement they cannot be in same room Tried to call her both on cell and in room, no answer Anticipated Discharge: Greater than 48 hours Hospital Problems  Never Reviewed Codes Class Noted POA Acute hypoxemic respiratory failure ([facility] [place] [address] ICD-10-CM: J96.01 
ICD-9-CM: 518.81  5/22/2021 Unknown Pneumonia due to COVID-19 virus ICD-10-CM: U07.1, J12.82 ICD-9-CM: 480.8, 079.89  5/22/2021 Unknown Review of Systems: A comprehensive review of systems was negative except for that written in the HPI. Vital Signs:  
 Last 24hrs VS reviewed since prior progress note. Most recent are: 
Visit Vitals BP (!) 162/86 (BP 1 Location: Right upper arm, BP Patient Position: At rest) Pulse (!) 56 Temp 97.6 °F (36.4 °C) Resp 17 Ht 5' 11\" (1.803 m) Wt 99.9 kg (220 lb 3.2 oz) SpO2 99% BMI 30.71 kg/m² Intake/Output Summary (Last 24 hours) at 5/28/2021 1245 Last data filed at 5/28/2021 1017 Gross per 24 hour Intake 250 ml Output 1275 ml Net -1025 ml Physical Examination:  
 
I had a face to face encounter with this patient and independently examined them on5/28/2021 as outlined below: 
     
Constitutional:  Lying in bed, on BiPAP. Alert HEENT:  Atraumatic. Oral mucosa moist,. Non icteric sclera. No pallor. Resp:  On BiPAP, decreased air mvmt CV:  Regular rhythm GI:  Soft, non distended, non tender Musculoskeletal:  No edema, warm Neurologic:  REZA, A&O Data Review:  
 Review and/or order of clinical lab test 
Review and/or order of tests in the radiology section of CPT Review and/or order of tests in the medicine section of CPT Labs:  
 
Recent Labs  
  05/28/21 
8635 05/27/21 0414 WBC 23.7*  --   
HGB 12.7 13.1 HCT 38.7 40.0   --   
 
Recent Labs  
  05/28/21 
0508 05/27/21 0414 05/26/21 
0516 NA  --  141 140 K  --  3.7 3.9 CL  --  103 107 CO2  --  29 25 BUN  --  31* 29* CREA  --  0.90 0.87 GLU  --  143* 124* CA  --  8.5 8.1*  
MG 2.2  --   --   
 
Recent Labs  
  05/27/21 0414 05/26/21 
0516 ALT 55 39 AP 72 72 TBILI 0.5 0.5 TP 6.3* 6.0* ALB 2.4* 2.3*  
GLOB 3.9 3.7 No results for input(s): INR, PTP, APTT, INREXT, INREXT in the last 72 hours. No results for input(s): FE, TIBC, PSAT, FERR in the last 72 hours. No results found for: FOL, RBCF No results for input(s): PH, PCO2, PO2 in the last 72 hours. No results for input(s): CPK, CKNDX, TROIQ in the last 72 hours. No lab exists for component: CPKMB No results found for: CHOL, CHOLX, CHLST, CHOLV, HDL, HDLP, LDL, LDLC, DLDLP, TGLX, TRIGL, TRIGP, CHHD, CHHDX No results found for: [facility] No results found for: COLOR, APPRN, SPGRU, REFSG, BRYANNA, PROTU, GLUCU, KETU, BILU, UROU, DANNY, LEUKU, GLUKE, EPSU, BACTU, WBCU, RBCU, CASTS, UCRY Medications Reviewed:  
 
Current Facility-Administered Medications Medication Dose Route Frequency  furosemide (LASIX) injection 20 mg  20 mg IntraVENous BID  albuterol (PROVENTIL HFA, VENTOLIN HFA, PROAIR HFA) inhaler 2 Puff  2 Puff Inhalation TID RT  
 methylPREDNISolone (PF) (SOLU-MEDROL) injection 60 mg  60 mg IntraVENous Q6H  
 enoxaparin (LOVENOX) injection 100 mg  100 mg SubCUTAneous Q12H  
 fluticasone propionate (FLONASE) 50 mcg/actuation nasal spray 2 Spray  2 Spray Both Nostrils QHS  sodium chloride (OCEAN) 0.65 % nasal squeeze bottle 2 Spray  2 Spray Both Nostrils Q2H PRN  pantoprazole (PROTONIX) 40 mg in 0.9% sodium chloride 10 mL injection  40 mg IntraVENous Q24H  
 sodium chloride (NS) flush 5-40 mL  5-40 mL IntraVENous Q8H  
 sodium chloride (NS) flush 5-40 mL  5-40 mL IntraVENous PRN  
 sodium chloride (NS) flush 5-40 mL  5-40 mL IntraVENous Q8H  
 sodium chloride (NS) flush 5-40 mL  5-40 mL IntraVENous PRN  
 acetaminophen (TYLENOL) tablet 650 mg  650 mg Oral Q6H PRN Or  
 acetaminophen (TYLENOL) suppository 650 mg  650 mg Rectal Q6H PRN  polyethylene glycol (MIRALAX) packet 17 g  17 g Oral DAILY PRN  promethazine (PHENERGAN) tablet 12.5 mg  12.5 mg Oral Q6H PRN Or  
 ondansetron (ZOFRAN) injection 4 mg  4 mg IntraVENous Q6H PRN  
 acyclovir (ZOVIRAX) tablet 400 mg  400 mg Oral BID  ferrous sulfate tablet 325 mg  1 Tablet Oral TID WITH MEALS  
 FLUoxetine (PROzac) capsule 40 mg  40 mg Oral DAILY  folic acid (FOLVITE) tablet 1 mg  1 mg Oral DAILY  tamsulosin (FLOMAX) capsule 0.4 mg  0.4 mg Oral DAILY  
 
______________________________________________________________________ EXPECTED LENGTH OF STAY: 4d 19h ACTUAL LENGTH OF STAY:          6 Ming Whitman MD

## 2021-05-29 NOTE — PROGRESS NOTES
Verbal bedside report given to [facility] RN oncoming nurse by ERNST Flores RN off-going nurse. Report included current pt status and condition, recent results, hx of present illness, heart rate and rhythm, and respiratory status.

## 2021-05-29 NOTE — PROGRESS NOTES
Bedside shift change report given to DB Sol (oncoming nurse) by Alaina Kurtz RN (offgoing nurse). Report included the following information SBAR, Kardex, ED Summary, Procedure Summary, Intake/Output and Cardiac Rhythm edith. Hourly rounding done, pt in bradycardia, on continious bipap, O2 saturation greater than 93%, using urinal at bedside, no complaints of pain throughout shift, call bell within reach, use explained to pt 
 
0500: unable to draw labs on patient. Asked Cari Gomez NP for order for art stick Cari Gomez NP notified. SUZY Moreira did not place order for art stick as patient may need ABGs in the future and art stick is reserved for emergent labs. SUZY Moreira suggested patient may be good candidate for PICC/midline. Will inform day shift nurse. 23:00: Attempted to take patient to CT. Pt does not have 20g IV which is needed for CT. No one able to get IV in. ICU unsuccessful. CCU nurse unavailable. Notified Cari Gomez NP. Unable to take patient. Hourly rounding done, pt in sinus edith, on continuous bipap, O2 saturation greater than 93%, using urinal at bedside, no complaints of pain throughout shift, call bell within reach, use explained to pt Problem: Risk for Spread of Infection Goal: Prevent transmission of infectious organism to others Description: Prevent the transmission of infectious organisms to other patients, staff members, and visitors. Outcome: Progressing Towards Goal 
  
Problem: Breathing Pattern - Ineffective Goal: *Absence of hypoxia Outcome: Progressing Towards Goal 
  
Problem: Airway Clearance - Ineffective Goal: Achieve or maintain patent airway Outcome: Progressing Towards Goal 
  
Problem: Gas Exchange - Impaired Goal: Absence of hypoxia Outcome: Progressing Towards Goal 
  
Problem: Breathing Pattern - Ineffective Goal: Ability to achieve and maintain a regular respiratory rate Outcome: Progressing Towards Goal 
  
Problem: Body Temperature -  Risk of, Imbalanced Goal: Ability to maintain a body temperature within defined limits Outcome: Progressing Towards Goal 
  
Problem: Risk for Fluid Volume Deficit Goal: Maintain normal heart rhythm Outcome: Progressing Towards Goal 
  
Problem: Loneliness or Risk for Loneliness Goal: Demonstrate positive use of time alone when socialization is not possible Outcome: Progressing Towards Goal 
  
Problem: Fatigue Goal: Verbalize increase energy and improved vitality Outcome: Progressing Towards Goal 
  
Problem: Pressure Injury - Risk of 
Goal: *Prevention of pressure injury Description: Document Ari Scale and appropriate interventions in the flowsheet. Outcome: Progressing Towards Goal 
Note: Pressure Injury Interventions: 
Sensory Interventions: Assess changes in LOC Moisture Interventions: Absorbent underpads, Minimize layers Activity Interventions: Pressure redistribution bed/mattress(bed type) Mobility Interventions: Assess need for specialty bed, HOB 30 degrees or less Nutrition Interventions: Document food/fluid/supplement intake Friction and Shear Interventions: Apply protective barrier, creams and emollients

## 2021-05-29 NOTE — PROGRESS NOTES
[address] [facility] Adult  Hospitalist Group Hospitalist Progress Note Cheko Taylor MD 
Answering service: [phone] OR [phone] from in house phone Date of Service:  2021 NAME:  Yves Moeller :  1945 MRN:  [id number] Admission Summary: This is a 62-year-old gentleman who is currently undergoing treatment for multiple myeloma was brought to the ED for rectal bleed and shortness of breath. Upon arrival of EMS at his home, patient was found to be in respiratory distress and hypoxic with SPO2 in the 70s and he was put on NRB and brought to the ED. In the ED hospital on high flow oxygen. Interval history / Subjective:  
   
Patient remains on continuous BiPAP. He is alert and oriented and communicative. No overnight events. NAD. Nurses unable to draw AM labs. Also unable to get CTA d/t not having access. No acute complaints. Assessment & Plan:  
Acute hypoxic respiratory failure Bilateral Covid19 pneumonia Severe sepsis (presented with hypoxic respiratory failure, lactic acidosis, leukocytosis) secondary to bilateral pneumonia 
-On continuous BiPAP, wean as able 
-On dexamethasone. s/p empiric antibiotics. Received Actemra 
-Lovenox switched to empiric full dose due to increased suspicion for VTE 
-Stopped remdesivir due to worsening bradycardia 
-CRP, ddimer trending down 
-lasix bid -appreciate pulm 
-albuterol -PICC ordered for access Chronic intermittent rectal bleeding related to his cancer and chemotherapy. Rectal bleed improved with recent switch of his chemotherapy Pomalyst per his wife. -IV Protonix 
-No rectal bleeding since admission 
-hgb stable, monitor Bradycardia. Improved after stopping remdesivir Hypokalemia: Replaced Mag ok History of multiple myeloma: On Pomalyst 3 mg daily for 21 days with 7 days off.     Patient usually follows at [facility] on hold, oncology agreed. Hypertension, slightly above goal, resume home norvasc Continue PTA acyclovir, folic acid, Prozac and tamsulosin Patient's Baseline: Ambulates with walking DVT ppx: Lovenox tx Code status: full Disposition: TBD 
wife Topher Kauffman [phone], now admitted here for similar, unfortunately per bed placement they cannot be in same room Updated wife this morning in her room Anticipated Discharge: Greater than 48 hours Hospital Problems  Never Reviewed Codes Class Noted POA Acute hypoxemic respiratory failure ([facility] [address] ICD-10-CM: J96.01 
ICD-9-CM: 518.81  5/22/2021 Unknown Pneumonia due to COVID-19 virus ICD-10-CM: U07.1, J12.82 ICD-9-CM: 480.8, 079.89  5/22/2021 Unknown Review of Systems: A comprehensive review of systems was negative except for that written in the HPI. Vital Signs:  
 Last 24hrs VS reviewed since prior progress note. Most recent are: 
Visit Vitals BP (!) 142/56 (BP 1 Location: Right upper arm, BP Patient Position: At rest) Pulse (!) 44 Temp 97.4 °F (36.3 °C) Resp 19 Ht 5' 11\" (1.803 m) Wt 98.9 kg (218 lb 1.6 oz) SpO2 99% BMI 30.42 kg/m² Intake/Output Summary (Last 24 hours) at 5/29/2021 5511 Last data filed at 5/29/2021 9004 Gross per 24 hour Intake 250 ml Output 2200 ml Net -1950 ml Physical Examination:  
 
I had a face to face encounter with this patient and independently examined them on5/29/2021 as outlined below: 
     
Constitutional:  Lying in bed, on BiPAP. Alert HEENT:  Atraumatic. Oral mucosa moist,. Non icteric sclera. No pallor. Resp:  On BiPAP, decreased air mvmt CV:  Regular rhythm GI:  Soft, non distended, non tender Musculoskeletal:  No edema, warm Neurologic:  REZA, A&O Data Review:  
 Review and/or order of clinical lab test 
Review and/or order of tests in the radiology section of CPT Review and/or order of tests in the medicine section of CPT Labs:  
 
Recent Labs  
  05/28/21 
0508 05/27/21 0414 WBC 23.7*  --   
HGB 12.7 13.1 HCT 38.7 40.0   --   
 
Recent Labs  
  05/28/21 
0508 05/27/21 0414 NA  --  141 K  --  3.7 CL  --  103 CO2  --  29 BUN  --  31* CREA  --  0.90 GLU  --  143* CA  --  8.5 MG 2.2  --   
 
Recent Labs  
  05/27/21 0414 ALT 55 AP 72 TBILI 0.5 TP 6.3* ALB 2.4*  
GLOB 3.9 No results for input(s): INR, PTP, APTT, INREXT, INREXT in the last 72 hours. No results for input(s): FE, TIBC, PSAT, FERR in the last 72 hours. No results found for: FOL, RBCF No results for input(s): PH, PCO2, PO2 in the last 72 hours. No results for input(s): CPK, CKNDX, TROIQ in the last 72 hours. No lab exists for component: CPKMB No results found for: CHOL, CHOLX, CHLST, CHOLV, HDL, HDLP, LDL, LDLC, DLDLP, TGLX, TRIGL, TRIGP, CHHD, CHHDX No results found for: Daune Simmer No results found for: COLOR, APPRN, SPGRU, REFSG, BRYANNA, PROTU, GLUCU, KETU, BILU, UROU, DANNY, LEUKU, GLUKE, EPSU, BACTU, WBCU, RBCU, CASTS, UCRY Medications Reviewed:  
 
Current Facility-Administered Medications Medication Dose Route Frequency  furosemide (LASIX) injection 20 mg  20 mg IntraVENous BID  albuterol (PROVENTIL HFA, VENTOLIN HFA, PROAIR HFA) inhaler 2 Puff  2 Puff Inhalation TID RT  
 methylPREDNISolone (PF) (SOLU-MEDROL) injection 60 mg  60 mg IntraVENous Q6H  
 enoxaparin (LOVENOX) injection 100 mg  100 mg SubCUTAneous Q12H  
 fluticasone propionate (FLONASE) 50 mcg/actuation nasal spray 2 Spray  2 Spray Both Nostrils QHS  sodium chloride (OCEAN) 0.65 % nasal squeeze bottle 2 Spray  2 Spray Both Nostrils Q2H PRN  pantoprazole (PROTONIX) 40 mg in 0.9% sodium chloride 10 mL injection  40 mg IntraVENous Q24H  
 sodium chloride (NS) flush 5-40 mL  5-40 mL IntraVENous Q8H  
 sodium chloride (NS) flush 5-40 mL  5-40 mL IntraVENous PRN  
 sodium chloride (NS) flush 5-40 mL  5-40 mL IntraVENous Q8H  
 sodium chloride (NS) flush 5-40 mL  5-40 mL IntraVENous PRN  
 acetaminophen (TYLENOL) tablet 650 mg  650 mg Oral Q6H PRN Or  
 acetaminophen (TYLENOL) suppository 650 mg  650 mg Rectal Q6H PRN  polyethylene glycol (MIRALAX) packet 17 g  17 g Oral DAILY PRN  promethazine (PHENERGAN) tablet 12.5 mg  12.5 mg Oral Q6H PRN Or  
 ondansetron (ZOFRAN) injection 4 mg  4 mg IntraVENous Q6H PRN  
 acyclovir (ZOVIRAX) tablet 400 mg  400 mg Oral BID  ferrous sulfate tablet 325 mg  1 Tablet Oral TID WITH MEALS  
 FLUoxetine (PROzac) capsule 40 mg  40 mg Oral DAILY  folic acid (FOLVITE) tablet 1 mg  1 mg Oral DAILY  tamsulosin (FLOMAX) capsule 0.4 mg  0.4 mg Oral DAILY  
 
______________________________________________________________________ EXPECTED LENGTH OF STAY: 4d 19h ACTUAL LENGTH OF STAY:          7 Cristhian Chauhan MD

## 2021-05-30 NOTE — PROGRESS NOTES
Went into patient room monitor showed 8 runs of MD Harish tobias sitting at nurses station. Patient left arm lead was off. Replaced lead. Patient stated he feel fine.

## 2021-05-30 NOTE — PROGRESS NOTES
The patient is refusing to go on the hi flow cannula. He feels that it increases his work of breathing and wants to stay on his BIPAP

## 2021-05-30 NOTE — PROGRESS NOTES
[facility] [facility] Adult  Hospitalist Group Hospitalist Progress Note Jennfier Johnson MD 
Answering service: [phone] OR [phone] from in house phone Date of Service:  2021 NAME:  Julee Uribe :  1945 MRN:  [id number] Admission Summary: This is a 44-year-old gentleman who is currently undergoing treatment for multiple myeloma was brought to the ED for rectal bleed and shortness of breath. Upon arrival of EMS at his home, patient was found to be in respiratory distress and hypoxic with SPO2 in the 70s and he was put on NRB and brought to the ED. In the ED hospital on high flow oxygen. Interval history / Subjective:  
   
Patient remains on continuous BiPAP. He is alert and oriented and communicative. No overnight events. NAD. Able to eat briefly off bipap. No acute complaints. Assessment & Plan:  
Acute hypoxic respiratory failure Bilateral Covid19 pneumonia Severe sepsis (presented with hypoxic respiratory failure, lactic acidosis, leukocytosis) secondary to bilateral pneumonia 
-On continuous BiPAP, wean as able; switch to HFNC if able 
-On dexamethasone. s/p empiric antibiotics. Received Actemra 
-Lovenox switched to empiric full dose due to increased suspicion for VTE 
CTA to r/o PE 
-Stopped remdesivir due to worsening bradycardia 
-CRP, ddimer trending down 
-lasix bid, change to daily -appreciate pulm 
-albuterol Chronic intermittent rectal bleeding related to his cancer and chemotherapy. Rectal bleed improved with recent switch of his chemotherapy Pomalyst per his wife. -IV Protonix 
-No rectal bleeding since admission 
-hgb stable, monitor Bradycardia. Improved after stopping remdesivir Hypokalemia: Replaced Mag ok History of multiple myeloma: On Pomalyst 3 mg daily for 21 days with 7 days off.     Patient usually follows at [facility] on hold, oncology agreed Hypertension, improving, cont home norvasc Continue PTA acyclovir, folic acid, Prozac and tamsulosin Patient's Baseline: Ambulates with walking DVT ppx: Lovenox tx Code status: full Disposition: TBD 
wife Destini Thomas [phone], now admitted here for similar, unfortunately per bed placement they cannot be in same room Anticipated Discharge: Greater than 48 hours Hospital Problems  Never Reviewed Codes Class Noted POA Acute hypoxemic respiratory failure ([facility] [address] ICD-10-CM: J96.01 
ICD-9-CM: 518.81  5/22/2021 Unknown Pneumonia due to COVID-19 virus ICD-10-CM: U07.1, J12.82 ICD-9-CM: 480.8, 079.89  5/22/2021 Unknown Review of Systems: A comprehensive review of systems was negative except for that written in the HPI. Vital Signs:  
 Last 24hrs VS reviewed since prior progress note. Most recent are: 
Visit Vitals /66 (BP 1 Location: Right upper arm, BP Patient Position: At rest) Pulse (!) 53 Temp 98 °F (36.7 °C) Resp 21 Ht 5' 11\" (1.803 m) Wt 98.1 kg (216 lb 4.8 oz) SpO2 95% BMI 30.17 kg/m² Intake/Output Summary (Last 24 hours) at 5/30/2021 [facility] Last data filed at 5/30/2021 0400 Gross per 24 hour Intake 240 ml Output 1625 ml Net -1385 ml Physical Examination:  
 
I had a face to face encounter with this patient and independently examined them on5/30/2021 as outlined below: 
     
Constitutional:  Lying in bed, on BiPAP. Alert HEENT:  Atraumatic. Oral mucosa moist,. Non icteric sclera. No pallor. Resp:  On BiPAP, decreased air mvmt but grossly clear CV:  Regular rhythm GI:  Soft, non distended, non tender Musculoskeletal:  No edema, warm Neurologic:  REZA, A&Ox3 Data Review:  
 Review and/or order of clinical lab test 
Review and/or order of tests in the radiology section of CPT Review and/or order of tests in the medicine section of CPT Labs:  
 
Recent Labs  
  05/28/21 
9416 WBC 23.7* HGB 12.7 HCT 38.7  Recent Labs 05/30/21 
0404 05/28/21 
3175   --   
K 3.8  --   
CL 98  --   
CO2 35*  --   
BUN 35*  --   
CREA 0.90  --   
*  --   
CA 7.7*  --   
MG  --  2.2 No results for input(s): ALT, AP, TBIL, TBILI, TP, ALB, GLOB, GGT, AML, LPSE in the last 72 hours. No lab exists for component: SGOT, GPT, AMYP, HLPSE No results for input(s): INR, PTP, APTT, INREXT, INREXT in the last 72 hours. No results for input(s): FE, TIBC, PSAT, FERR in the last 72 hours. No results found for: FOL, RBCF No results for input(s): PH, PCO2, PO2 in the last 72 hours. No results for input(s): CPK, CKNDX, TROIQ in the last 72 hours. No lab exists for component: CPKMB No results found for: CHOL, CHOLX, CHLST, CHOLV, HDL, HDLP, LDL, LDLC, DLDLP, TGLX, TRIGL, TRIGP, CHHD, CHHDX No results found for: [facility] No results found for: COLOR, APPRN, SPGRU, REFSG, BRYANNA, PROTU, GLUCU, KETU, BILU, UROU, DANNY, LEUKU, GLUKE, EPSU, BACTU, WBCU, RBCU, CASTS, UCRY Medications Reviewed:  
 
Current Facility-Administered Medications Medication Dose Route Frequency  amLODIPine (NORVASC) tablet 5 mg  5 mg Oral DAILY  guaiFENesin (ROBITUSSIN) 100 mg/5 mL oral liquid 100 mg  100 mg Oral Q4H PRN  
 furosemide (LASIX) injection 20 mg  20 mg IntraVENous BID  albuterol (PROVENTIL HFA, VENTOLIN HFA, PROAIR HFA) inhaler 2 Puff  2 Puff Inhalation TID RT  
 methylPREDNISolone (PF) (SOLU-MEDROL) injection 60 mg  60 mg IntraVENous Q6H  
 enoxaparin (LOVENOX) injection 100 mg  100 mg SubCUTAneous Q12H  
 fluticasone propionate (FLONASE) 50 mcg/actuation nasal spray 2 Spray  2 Spray Both Nostrils QHS  sodium chloride (OCEAN) 0.65 % nasal squeeze bottle 2 Spray  2 Spray Both Nostrils Q2H PRN  pantoprazole (PROTONIX) 40 mg in 0.9% sodium chloride 10 mL injection  40 mg IntraVENous Q24H  
 sodium chloride (NS) flush 5-40 mL  5-40 mL IntraVENous Q8H  
 sodium chloride (NS) flush 5-40 mL  5-40 mL IntraVENous PRN  
 sodium chloride (NS) flush 5-40 mL  5-40 mL IntraVENous Q8H  
 sodium chloride (NS) flush 5-40 mL  5-40 mL IntraVENous PRN  
 acetaminophen (TYLENOL) tablet 650 mg  650 mg Oral Q6H PRN Or  
 acetaminophen (TYLENOL) suppository 650 mg  650 mg Rectal Q6H PRN  polyethylene glycol (MIRALAX) packet 17 g  17 g Oral DAILY PRN  promethazine (PHENERGAN) tablet 12.5 mg  12.5 mg Oral Q6H PRN Or  
 ondansetron (ZOFRAN) injection 4 mg  4 mg IntraVENous Q6H PRN  
 acyclovir (ZOVIRAX) tablet 400 mg  400 mg Oral BID  ferrous sulfate tablet 325 mg  1 Tablet Oral TID WITH MEALS  
 FLUoxetine (PROzac) capsule 40 mg  40 mg Oral DAILY  folic acid (FOLVITE) tablet 1 mg  1 mg Oral DAILY  tamsulosin (FLOMAX) capsule 0.4 mg  0.4 mg Oral DAILY  
 
______________________________________________________________________ EXPECTED LENGTH OF STAY: 4d 19h ACTUAL LENGTH OF STAY:          8 Cristhian Chauhan MD

## 2021-05-30 NOTE — PROGRESS NOTES
Bedside and Verbal shift change report given to 1900 Mary Buckner (oncoming nurse) by SCOTTIE Arreaga RN (offgoing nurse). Report given with SBAR, Kardex, Intake/Output, MAR and Recent Results.

## 2021-05-30 NOTE — PROGRESS NOTES
Verbal bedside report given to Ramo RN oncoming nurse by ERNST Flores RN off-going nurse. Report included current pt status and condition, recent results, hx of present illness, heart rate and rhythm, and respiratory status. 830 Jan Hernandez Pt got aggressive with RT and asked RT to leave the room when he attempted to transfer pt from BIPAP to Hi-Flow 
 
0800 Pt is fatigued today

## 2021-05-30 NOTE — PROGRESS NOTES
Bedside shift change report given to DB Sol (oncoming nurse) by July Flores RN (offgoing nurse). Report included the following information SBAR, Kardex, Recent Results, Cardiac Rhythm SB and Dual Neuro Assessment.

## 2021-05-31 NOTE — PROGRESS NOTES
[address] [facility] Adult  Hospitalist Group Hospitalist Progress Note Teresita Tam MD 
Answering service: [phone] OR [phone] from in house phone Date of Service:  2021 NAME:  Pepe Veloz :  1945 MRN:  [id number] Admission Summary: This is a 68-year-old gentleman who is currently undergoing treatment for multiple myeloma was brought to the ED for rectal bleed and shortness of breath. Upon arrival of EMS at his home, patient was found to be in respiratory distress and hypoxic with SPO2 in the 70s and he was put on NRB and brought to the ED. In the ED hospital on high flow oxygen. Interval history / Subjective:  
   
Patient remains on continuous BiPAP. Tried HFNC yesterday but didn't like it and felt SOB so wanted to go back on bipap. He is alert and oriented and communicative. No overnight events. NAD. Able to eat briefly off bipap. No acute complaints. Assessment & Plan:  
Acute hypoxic respiratory failure Bilateral Covid19 pneumonia Severe sepsis (presented with hypoxic respiratory failure, lactic acidosis, leukocytosis) secondary to bilateral pneumonia 
-On continuous BiPAP, wean as able 
-On dexamethasone, wean slowly. s/p empiric antibiotics. Received Actemra 
-Lovenox switched to empiric full dose due to increased suspicion for VTE 
CTA to r/o PE 
-Stopped remdesivir due to worsening bradycardia 
-CRP, ddimer trending down 
-trial off lasix -appreciate pulm 
-albuterol Chronic intermittent rectal bleeding related to his cancer and chemotherapy. Rectal bleed improved with recent switch of his chemotherapy Pomalyst per his wife. -IV Protonix 
-No rectal bleeding since admission 
-hgb stable, monitor Bradycardia. Improved after stopping remdesivir Hypokalemia: Replaced Mag ok History of multiple myeloma: On Pomalyst 3 mg daily for 21 days with 7 days off. Patient usually follows at [facility] on hold, oncology agreed Hypertension, improving, cont home norvasc Continue PTA acyclovir, folic acid, Prozac and tamsulosin Patient's Baseline: Ambulates with walking DVT ppx: Lovenox tx empiric Code status: full Disposition: TBD 
wife Steve Lange [phone], now admitted here for similar, unfortunately per bed placement they cannot be in same room Anticipated Discharge: Greater than 48 hours Hospital Problems  Never Reviewed Codes Class Noted POA Acute hypoxemic respiratory failure ([facility] [address] ICD-10-CM: J96.01 
ICD-9-CM: 518.81  5/22/2021 Unknown Pneumonia due to COVID-19 virus ICD-10-CM: U07.1, J12.82 ICD-9-CM: 480.8, 079.89  5/22/2021 Unknown Review of Systems: A comprehensive review of systems was negative except for that written in the HPI. Vital Signs:  
 Last 24hrs VS reviewed since prior progress note. Most recent are: 
Visit Vitals /76 (BP 1 Location: Right upper arm, BP Patient Position: At rest) Pulse (!) 45 Temp 98.4 °F (36.9 °C) Resp 19 Ht 5' 11\" (1.803 m) Wt 98.1 kg (216 lb 4.8 oz) SpO2 98% BMI 30.17 kg/m² Intake/Output Summary (Last 24 hours) at 5/31/2021 2115 Last data filed at 5/31/2021 0300 Gross per 24 hour Intake 360 ml Output 1000 ml Net -640 ml Physical Examination:  
 
I had a face to face encounter with this patient and independently examined them on5/31/2021 as outlined below: 
     
Constitutional:  Lying in bed, on BiPAP. Alert HEENT:  Atraumatic. Oral mucosa moist,. Non icteric sclera. No pallor. Resp:  On BiPAP, good air mvmt, clear CV:  Regular rhythm GI:  Soft, non distended, non tender Musculoskeletal:  No edema, warm Neurologic:  REZA, A&Ox3 Data Review:  
 Review and/or order of clinical lab test 
Review and/or order of tests in the radiology section of CPT Review and/or order of tests in the medicine section of CPT Labs:  
 
Recent Labs 05/31/21 
0009 WBC 28.5* HGB 13.1 HCT 40.7  Recent Labs 05/31/21 
0009 05/30/21 
0404  138  
K 4.2 3.8  98 CO2 30 35* BUN 36* 35* CREA 0.94 0.90 * 140* CA 7.9* 7.7* Recent Labs 05/31/21 
0009 ALB 2.4* No results for input(s): INR, PTP, APTT, INREXT, INREXT in the last 72 hours. No results for input(s): FE, TIBC, PSAT, FERR in the last 72 hours. No results found for: FOL, RBCF No results for input(s): PH, PCO2, PO2 in the last 72 hours. No results for input(s): CPK, CKNDX, TROIQ in the last 72 hours. No lab exists for component: CPKMB No results found for: CHOL, CHOLX, CHLST, CHOLV, HDL, HDLP, LDL, LDLC, DLDLP, TGLX, TRIGL, TRIGP, CHHD, CHHDX No results found for: Janace Girt No results found for: COLOR, APPRN, SPGRU, REFSG, BRYANNA, PROTU, GLUCU, KETU, BILU, UROU, DANNY, LEUKU, GLUKE, EPSU, BACTU, WBCU, RBCU, CASTS, UCRY Medications Reviewed:  
 
Current Facility-Administered Medications Medication Dose Route Frequency  furosemide (LASIX) injection 20 mg  20 mg IntraVENous DAILY  amLODIPine (NORVASC) tablet 5 mg  5 mg Oral DAILY  guaiFENesin (ROBITUSSIN) 100 mg/5 mL oral liquid 100 mg  100 mg Oral Q4H PRN  
 albuterol (PROVENTIL HFA, VENTOLIN HFA, PROAIR HFA) inhaler 2 Puff  2 Puff Inhalation TID RT  
 methylPREDNISolone (PF) (SOLU-MEDROL) injection 60 mg  60 mg IntraVENous Q6H  
 enoxaparin (LOVENOX) injection 100 mg  100 mg SubCUTAneous Q12H  
 fluticasone propionate (FLONASE) 50 mcg/actuation nasal spray 2 Spray  2 Spray Both Nostrils QHS  sodium chloride (OCEAN) 0.65 % nasal squeeze bottle 2 Spray  2 Spray Both Nostrils Q2H PRN  pantoprazole (PROTONIX) 40 mg in 0.9% sodium chloride 10 mL injection  40 mg IntraVENous Q24H  
 sodium chloride (NS) flush 5-40 mL  5-40 mL IntraVENous Q8H  
 sodium chloride (NS) flush 5-40 mL  5-40 mL IntraVENous PRN  
 sodium chloride (NS) flush 5-40 mL 5-40 mL IntraVENous Q8H  
 sodium chloride (NS) flush 5-40 mL  5-40 mL IntraVENous PRN  
 acetaminophen (TYLENOL) tablet 650 mg  650 mg Oral Q6H PRN Or  
 acetaminophen (TYLENOL) suppository 650 mg  650 mg Rectal Q6H PRN  polyethylene glycol (MIRALAX) packet 17 g  17 g Oral DAILY PRN  promethazine (PHENERGAN) tablet 12.5 mg  12.5 mg Oral Q6H PRN Or  
 ondansetron (ZOFRAN) injection 4 mg  4 mg IntraVENous Q6H PRN  
 acyclovir (ZOVIRAX) tablet 400 mg  400 mg Oral BID  ferrous sulfate tablet 325 mg  1 Tablet Oral TID WITH MEALS  
 FLUoxetine (PROzac) capsule 40 mg  40 mg Oral DAILY  folic acid (FOLVITE) tablet 1 mg  1 mg Oral DAILY  tamsulosin (FLOMAX) capsule 0.4 mg  0.4 mg Oral DAILY  
 
______________________________________________________________________ EXPECTED LENGTH OF STAY: 4d 19h ACTUAL LENGTH OF STAY:          9 Alonzo Lambert MD

## 2021-05-31 NOTE — PROGRESS NOTES
2000 Report received from LANE RUSS RN. Patient sitting up in bed, no needs expressed. 2210 Patient called out stating he was having a hard time breathing and catching his breathe while on HFNC sats 88%, Patient requested to be put back on BiPAP. 4511 Bedside shift change report given to DB Sol by Cynthia Elliott RN. Report included the following information SBAR, Kardex, MAR, Accordion, Recent Results and Cardiac Rhythm NSR.

## 2021-05-31 NOTE — PROGRESS NOTES
Clinical Pharmacy Note: Stress Ulcer Prophylaxis Protocol (Non-ICU patients) Please note that stress ulcer prophylaxis is not indicated for patients outside of the ICU. Hardik Mariajose has an order for pantoprazole that has been ordered with an indication of stress ulcer prophylaxis.   No other indication for this medication has been noted in the patients chart and therefore it has been discontinued per the [facility] Stress Ulcer Prophylaxis Protocol for eligible patients. Please call with any questions.

## 2021-05-31 NOTE — PROGRESS NOTES
Verbal bedside report given to Ramo RN oncoming nurse by ERNST Flores RN off-going nurse. Report included current pt status and condition, recent results, hx of present illness, heart rate and rhythm, and respiratory status. Ermias Juarez Pt is no longer able to maintain sats above 75 on Hi-flow, put back on BIPAP, called RT, and let MD know. 21  RT moved pt from BIPAP to Hi-flow, satting in high 90s Spoke with pt about his refusal to move from BIPAP to Hi-flow. He is willing to try again, turned monitor away from him so he can't be as vigilant

## 2021-06-01 NOTE — PROGRESS NOTES
[address] [facility] Adult  Hospitalist Group Hospitalist Progress Note uJdah Lara MD 
Answering service: [phone] or [phone] from in house phone Date of Service:  2021 NAME:  Sheyla Guevara :  1945 MRN:  [id number] Admission Summary: This is a 62-year-old gentleman who is currently undergoing treatment for multiple myeloma was brought to the ED for rectal bleed and shortness of breath. Upon arrival of EMS at his home, patient was found to be in respiratory distress and hypoxic with SPO2 in the 70s and he was put on NRB and brought to the ED. In the ED hospital on high flow oxygen. Interval history / Subjective:  
   
Patient remains on continuous BiPAP. Tried HFNC yesterday but didn't like it and felt SOB so wanted to go back on bipap. He is alert and oriented and communicative. No overnight events. NAD. Able to eat briefly off bipap. No acute complaints. Assessment & Plan:  
Acute hypoxic respiratory failure Bilateral Covid19 pneumonia Severe sepsis (presented with hypoxic respiratory failure, lactic acidosis, leukocytosis) secondary to bilateral pneumonia 
-On continuous BiPAP, wean as able 
-On dexamethasone, wean. s/p empiric antibiotics. Received Actemra 
-Lovenox switched to empiric full dose due to increased suspicion for VTE 
CTA to r/o PE 
-Stopped remdesivir due to worsening bradycardia 
-CRP, ddimer trending down 
-trial off lasix -appreciate pulm 
-albuterol Chronic intermittent rectal bleeding related to his cancer and chemotherapy. Rectal bleed improved with recent switch of his chemotherapy Pomalyst per his wife. 
-No rectal bleeding since admission 
-hgb stable Bradycardia. Improved after stopping remdesivir Hypokalemia: Replaced Mag ok History of multiple myeloma: On Pomalyst 3 mg daily for 21 days with 7 days off.     Patient usually follows at [facility] -Pomalyst on hold, oncology agreed Hypertension, improving, cont home norvasc Continue PTA acyclovir, folic acid, Prozac and tamsulosin Patient's Baseline: Ambulates with walking DVT ppx: Lovenox tx empiric Code status: full Disposition: TBD 
wife Prakash Norman [phone], now admitted here for similar Anticipated Discharge: Greater than 48 hours Hospital Problems  Never Reviewed Codes Class Noted POA Acute hypoxemic respiratory failure ([facility] [address] ICD-10-CM: J96.01 
ICD-9-CM: 518.81  5/22/2021 Unknown Pneumonia due to COVID-19 virus ICD-10-CM: U07.1, J12.82 ICD-9-CM: 480.8, 079.89  5/22/2021 Unknown Review of Systems: A comprehensive review of systems was negative except for that written in the HPI. Vital Signs:  
 Last 24hrs VS reviewed since prior progress note. Most recent are: 
Visit Vitals /70 Pulse (!) 54 Temp 98.3 °F (36.8 °C) Resp 17 Ht 5' 11\" (1.803 m) Wt 98.1 kg (216 lb 4.3 oz) SpO2 96% BMI 30.16 kg/m² Intake/Output Summary (Last 24 hours) at 6/1/2021 0940 Last data filed at 6/1/2021 7666 Gross per 24 hour Intake 720 ml Output 710 ml Net 10 ml Physical Examination:  
 
I had a face to face encounter with this patient and independently examined them on6/1/2021 as outlined below: 
     
Constitutional:  sitting up in bed, on BiPAP. Alert HEENT:  Atraumatic. Oral mucosa moist,. Non icteric sclera. No pallor. Resp:  On BiPAP, good air mvmt, clear CV:  Regular rhythm GI:  Soft, non distended, non tender Musculoskeletal:  No edema, warm Neurologic:  REZA, A&Ox3 Data Review:  
 Review and/or order of clinical lab test 
Review and/or order of tests in the radiology section of CPT Review and/or order of tests in the medicine section of CPT Labs:  
 
Recent Labs 05/31/21 
0009 WBC 28.5* HGB 13.1 HCT 40.7  Recent Labs  
  06/01/21 
0520 05/31/21 
0009 05/30/21 
0404  137 138  
K 4.5 4.2 3.8  100 98 CO2 30 30 35* BUN 31* 36* 35* CREA 0.76 0.94 0.90 * 253* 140* CA 7.9* 7.9* 7.7* Recent Labs 05/31/21 
0009 ALB 2.4* No results for input(s): INR, PTP, APTT, INREXT, INREXT in the last 72 hours. No results for input(s): FE, TIBC, PSAT, FERR in the last 72 hours. No results found for: FOL, RBCF No results for input(s): PH, PCO2, PO2 in the last 72 hours. No results for input(s): CPK, CKNDX, TROIQ in the last 72 hours. No lab exists for component: CPKMB No results found for: CHOL, CHOLX, CHLST, CHOLV, HDL, HDLP, LDL, LDLC, DLDLP, TGLX, TRIGL, TRIGP, CHHD, CHHDX Lab Results Component Value Date/Time Glucose (POC) 173 (H) 06/01/2021 08:21 AM  
 Glucose (POC) 285 (H) 05/31/2021 10:58 PM  
 Glucose (POC) 188 (H) 05/31/2021 04:22 PM  
 Glucose (POC) 225 (H) 05/31/2021 10:51 AM  
 
No results found for: COLOR, APPRN, SPGRU, REFSG, BRYANNA, PROTU, GLUCU, Burnice [place], BILU, UROU, DANNY, LEUKU, GLUKE, EPSU, BACTU, WBCU, RBCU, CASTS, UCRY Medications Reviewed:  
 
Current Facility-Administered Medications Medication Dose Route Frequency  insulin lispro (HUMALOG) injection   SubCUTAneous AC&HS  
 glucose chewable tablet 16 g  4 Tablet Oral PRN  
 dextrose (D50W) injection syrg 12.5-25 g  12.5-25 g IntraVENous PRN  
 glucagon (GLUCAGEN) injection 1 mg  1 mg IntraMUSCular PRN  
 methylPREDNISolone (PF) (SOLU-MEDROL) injection 60 mg  60 mg IntraVENous Q8H  
 insulin glargine (LANTUS) injection 5 Units  5 Units SubCUTAneous DAILY  amLODIPine (NORVASC) tablet 5 mg  5 mg Oral DAILY  guaiFENesin (ROBITUSSIN) 100 mg/5 mL oral liquid 100 mg  100 mg Oral Q4H PRN  
 albuterol (PROVENTIL HFA, VENTOLIN HFA, PROAIR HFA) inhaler 2 Puff  2 Puff Inhalation TID RT  
 enoxaparin (LOVENOX) injection 100 mg  100 mg SubCUTAneous Q12H  
 fluticasone propionate (FLONASE) 50 mcg/actuation nasal spray 2 Spray  2 Spray Both Nostrils QHS  sodium chloride (OCEAN) 0.65 % nasal squeeze bottle 2 Spray  2 Spray Both Nostrils Q2H PRN  
 sodium chloride (NS) flush 5-40 mL  5-40 mL IntraVENous Q8H  
 sodium chloride (NS) flush 5-40 mL  5-40 mL IntraVENous PRN  
 sodium chloride (NS) flush 5-40 mL  5-40 mL IntraVENous Q8H  
 sodium chloride (NS) flush 5-40 mL  5-40 mL IntraVENous PRN  
 acetaminophen (TYLENOL) tablet 650 mg  650 mg Oral Q6H PRN Or  
 acetaminophen (TYLENOL) suppository 650 mg  650 mg Rectal Q6H PRN  polyethylene glycol (MIRALAX) packet 17 g  17 g Oral DAILY PRN  promethazine (PHENERGAN) tablet 12.5 mg  12.5 mg Oral Q6H PRN Or  
 ondansetron (ZOFRAN) injection 4 mg  4 mg IntraVENous Q6H PRN  
 acyclovir (ZOVIRAX) tablet 400 mg  400 mg Oral BID  ferrous sulfate tablet 325 mg  1 Tablet Oral TID WITH MEALS  
 FLUoxetine (PROzac) capsule 40 mg  40 mg Oral DAILY  folic acid (FOLVITE) tablet 1 mg  1 mg Oral DAILY  tamsulosin (FLOMAX) capsule 0.4 mg  0.4 mg Oral DAILY  
 
______________________________________________________________________ EXPECTED LENGTH OF STAY: 4d 19h ACTUAL LENGTH OF STAY:          Norris Natarajan MD

## 2021-06-01 NOTE — PROGRESS NOTES
Pulmonary / Critical Care Note Name: Jamie Card  Unit: [unfilled] : 1945  Hospital: [address] Date: 2021 11:33 AM Date of Admission: 2021 Impression:   Plan:  
Acute hypoxic respiratory failure secondary to COVID-19 
-- s/p remdesivir, actemra (IL6 was 1021 ! ) -- IgG ok Hx multiple myeloma Former Smoker Rectal bleeding but stable H/H Bipap <-> high flow, as tolerated Steroids Volume management - consider restarting some lasix as tolerated Pulmonary toilet Prone as able, doing well going from side to side on bipap Follow labs / imaging, repeat procal in AM with rising WBC (though could be steroid induced) Lovenox - therapeutic dosing (ddimer 17) Guarded prognosis At risk for further decline, needs continued close monitoring. Currently NIPPV dependent. 30 minutes critical care time. CC:  
Chief Complaint Patient presents with  Respiratory Distress Interval History: 
Reports that he feels about the same. Denies new complaints. Seems to feel better laying on his side. Seems he is somewhat bipap-dependent at present, notes indicate trials to high flow were not terribly successful yesterday. Is able to stay off long enough to eat. Hospitalization:  
Presented for dyspnea, COVID positive. J&J 2 wks prior. High flow <-> bipap Dex + actemra. Was on remdesivir but this was stopped due to bradycardia. Exam Findings: 
General: Awake, alert, no acute distress HEENT: NC/AT, EOMI, moist mucous membranes Neck: Supple, no meningismus, no masses or swelling HEART: RRR, no murmurs/rubs/gallops Lungs: No deformities, normal chest rise and fall, no increased work of breathing Lung auscultation: Good air movement bilaterally, clear to auscultation Abdomen: Soft/NT, non rigid mildly distended Extremities: No cyanosis/clubbing, normal peripheral pulses, no edema Skin: Dry, normal temperature Neuro: A&O x 3, normal speech Psych: Normal affect, normal mood Vitals:  
Visit Vitals /70 Pulse (!) 54 Temp 98.3 °F (36.8 °C) Resp 17 Ht 5' 11\" (1.803 m) Wt 98.1 kg (216 lb 4.3 oz) SpO2 96% BMI 30.16 kg/m² Current Medications / Inpatient Orders: Active Orders Procedures MECHANICAL PROPHYLAXIS IS CONTRAINDICATED Other, please document Already on Anticoagulation Frequency: CONTINUOUS Number of Occurrences: Until Specified Order Comments: Already on Anticoagulation MECHANICAL PROPHYLAXIS IS CONTRAINDICATED Other, please document Already on Anticoagulation Frequency: CONTINUOUS Number of Occurrences: Until Specified Order Comments: Already on Anticoagulation Imaging CTA CHEST W OR W WO CONT Frequency: ONE TIME Number of Occurrences: 1 Occurrences Lab C REACTIVE PROTEIN, QT Frequency: DAILY Number of Occurrences: Until Specified D DIMER Frequency: DAILY Number of Occurrences: Until Specified METABOLIC PANEL, BASIC Frequency: TOMORROW AM  
  Number of Occurrences: 1 Occurrences NT-PRO BNP Frequency: DAILY Number of Occurrences: Until Specified Diet DIET REGULAR Frequency: DIET EFFECTIVE NOW Number of Occurrences: Until Specified Nursing ACTIVITY AS TOLERATED W/ASSIST Frequency: CONTINUOUS Number of Occurrences: Until Specified ACTIVITY AS TOLERATED W/ASSIST Frequency: CONTINUOUS Number of Occurrences: Until Specified ELEVATE HEAD OF BED Frequency: CONTINUOUS Number of Occurrences: Until Specified Order Comments: Elevate 30 Degrees. INTAKE AND OUTPUT Frequency: Q8H Number of Occurrences: Until Specified Order Comments: Call for urine ouput less than 120ml in 4 hours MEASURE HEIGHT Frequency: ONE TIME Number of Occurrences: 1 Occurrences NURSING-MISCELLANEOUS: Notify [facility] On-call for signs or symptoms of bleeding.  CONTINUOUS  
 Frequency: CONTINUOUS Number of Occurrences: Until Specified NURSING-MISCELLANEOUS: Patient is enrolled in COVID-19 Clinical Trial CONTINUOUS Frequency: CONTINUOUS Number of Occurrences: Until Specified NURSING-MISCELLANEOUS: PPE required for any aerosolizing procedure (ie, intubation, bronchoscopy). Surgical mask on patient for any transport outside room. Patient should be single room, closed door with notification of droplet plus isolation. CON. .. Frequency: CONTINUOUS Number of Occurrences: Until Specified NURSING-MISCELLANEOUS: Verify informed consent has been obtained, signed and dated for Assessing Safety, Hospitalization and Efficacy of rNAPc2 in COVID-19 ([facility]-COVID19: NAPc-201/301 IRB Protocol #[id number]) Please fax pages signed 10 and 11 to 8. .. Frequency: ONE TIME Number of Occurrences: 1 Occurrences Order Comments: Please fax pages signed [phone] to [phone] POC GLUCOSE Frequency: AC & HS  
  Number of Occurrences: Until Specified Order Comments: and PRN. Per sliding scale protocol PRONE POSITIONING (AS TOLERATED) UNTIL SPECIFIED Frequency: UNTIL SPECIFIED Number of Occurrences: Until Specified VITAL SIGNS Frequency: CONTINUOUS Number of Occurrences: Until Specified Order Comments: Per unit routine VITAL SIGNS Frequency: CONTINUOUS Number of Occurrences: Until Specified Order Comments: Per unit routine WEIGH PATIENT Frequency: ONE TIME Number of Occurrences: 1 Occurrences Code Status FULL CODE Frequency: CONTINUOUS Number of Occurrences: Until Specified Consult IP CONSULT TO INTENSIVIST Frequency: ONE TIME Number of Occurrences: 1 Occurrences Isolation Contact Isolation Frequency: CONTINUOUS Number of Occurrences: Until Specified Droplet Isolation Frequency: CONTINUOUS Number of Occurrences: Until Specified DROPLET PLUS   Frequency: CONTINUOUS Number of Occurrences: Until Specified Order Comments: Droplet Plus Isolation consists of: Droplet plus Contact plus Eye Protection (face shield/goggles) Nourishments DIET NUTRITIONAL SUPPLEMENTS All Meals; Ensure [employer] Frequency: DIET EFFECTIVE NOW Number of Occurrences: Until Specified DIET ONE TIME MESSAGE Frequency: ONE TIME Number of Occurrences: 1 Occurrences Order Comments: Regular diet Respiratory Care OXIMETRY, SPOT CHECK Frequency: PRN Number of Occurrences: Until Specified OXYGEN HIGH FLOW Frequency: CONTINUOUS Number of Occurrences: Until Specified Order Comments: RT to titrate to obtain SpO2 92% or > per protocol OXYGEN HIGH FLOW Frequency: CONTINUOUS Number of Occurrences: Until Specified RT--OXIMETRY, CONTINUOUS Frequency: CONTINUOUS Number of Occurrences: Until Specified RT--RESPIRATORY THERAPY MISCELLANEOUS Wean FiO2 for target SpO2 90% Routine CONTINUOUS Frequency: CONTINUOUS Number of Occurrences: Until Specified RT--WEAN PER RT OXYGEN PROTOCOL Frequency: CONTINUOUS Number of Occurrences: Until Specified ECG  
 EKG, 12 LEAD, INITIAL Frequency: ONE TIME Number of Occurrences: 1 Occurrences Transfer TRANSFER PATIENT Frequency: ONE TIME Number of Occurrences: 1 Occurrences Medications  
 acetaminophen (TYLENOL) suppository 650 mg Linked Order: Or Frequency: Q6H PRN Dose: 650 mg  
  Route: Rectal  
 acetaminophen (TYLENOL) tablet 650 mg Linked Order: Or Frequency: Q6H PRN Dose: 650 mg  
  Route: Oral  
 acyclovir (ZOVIRAX) tablet 400 mg Frequency: BID Dose: 400 mg  
  Route: Oral  
  Order Comments: OP SIG:Take 400 mg by mouth two (2) times a day. albuterol (PROVENTIL HFA, VENTOLIN HFA, PROAIR HFA) inhaler 2 Puff Frequency: TID RT Dose: 2 Puff Route: Inhalation  
 amLODIPine (NORVASC) tablet 5 mg Frequency: DAILY Dose: 5 mg Route: Oral  
  Order Comments: OP SIG:Take 10 mg by mouth daily. dextrose (D50W) injection syrg 12.5-25 g Frequency: PRN Dose: 12.5-25 g Route: IntraVENous  
 enoxaparin (LOVENOX) injection 100 mg Frequency: Q12H Dose: 100 mg  
  Route: SubCUTAneous  
 ferrous sulfate tablet 325 mg  
  Frequency: TID WITH MEALS Dose: 1 Tablet Route: Oral  
  Order Comments: OP SIG:Take  by mouth three (3) times daily (with meals). FLUoxetine (PROzac) capsule 40 mg  
  Frequency: DAILY Dose: 40 mg  
  Route: Oral  
  Order Comments: OP SIG:Take 40 mg by mouth daily. fluticasone propionate (FLONASE) 50 mcg/actuation nasal spray 2 Spray Frequency: QHS Dose: 2 Spray Route: Both Nostrils  
 folic acid (FOLVITE) tablet 1 mg Frequency: DAILY Dose: 1 mg Route: Oral  
  Order Comments: OP SIG:Take 1 mg by mouth daily. glucagon (GLUCAGEN) injection 1 mg Frequency: PRN Dose: 1 mg Route: IntraMUSCular  
 glucose chewable tablet 16 g Frequency: PRN Dose: 4 Tablet Route: Oral  
 guaiFENesin (ROBITUSSIN) 100 mg/5 mL oral liquid 100 mg Frequency: Q4H PRN Dose: 100 mg  
  Route: Oral  
 insulin glargine (LANTUS) injection 5 Units Frequency: DAILY Dose: 5 Units Route: SubCUTAneous  
 insulin lispro (HUMALOG) injection Frequency: AC&HS Route: SubCUTAneous  
 methylPREDNISolone (PF) (SOLU-MEDROL) injection 60 mg  
  Frequency: Q12H Dose: 60 mg  
  Route: IntraVENous  
 ondansetron (ZOFRAN) injection 4 mg Linked Order: Or Frequency: Q6H PRN Dose: 4 mg Route: IntraVENous  
 polyethylene glycol (MIRALAX) packet 17 g Frequency: DAILY PRN Dose: 17 g Route: Oral  
 promethazine (PHENERGAN) tablet 12.5 mg  
  Linked Order: Or Frequency: Q6H PRN Dose: 12.5 mg  
  Route: Oral  
 sodium chloride (NS) flush 5-40 mL Frequency: Q8H Dose: 5-40 mL   Route: IntraVENous  
 sodium chloride (NS) flush 5-40 mL Frequency: PRN Dose: 5-40 mL Route: IntraVENous  
 sodium chloride (NS) flush 5-40 mL Frequency: Q8H Dose: 5-40 mL Route: IntraVENous  
 sodium chloride (NS) flush 5-40 mL Frequency: PRN Dose: 5-40 mL Route: IntraVENous  
 sodium chloride (OCEAN) 0.65 % nasal squeeze bottle 2 Spray Frequency: Q2H PRN Dose: 2 Spray Route: Both Nostrils  
 tamsulosin (FLOMAX) capsule 0.4 mg  
  Frequency: DAILY Dose: 0.4 mg  
  Route: Oral  
  Order Comments: OP SIG:Take 0.4 mg by mouth daily. History: includes: 
Past Medical History:  
Diagnosis Date  Hypertension  Multiple myeloma ([facility] [address] Past Surgical History:  
Procedure Laterality Date  HX CHOLECYSTECTOMY Prior to Admission medications Medication Sig Start Date End Date Taking? Authorizing Provider FLUoxetine (PROzac) 40 mg capsule Take 40 mg by mouth daily. Yes Provider, Historical  
acyclovir (ZOVIRAX) 400 mg tablet Take 400 mg by mouth two (2) times a day. Yes Provider, Historical  
folic acid (FOLVITE) 1 mg tablet Take 1 mg by mouth daily. Yes Provider, Historical  
amLODIPine (NORVASC) 10 mg tablet Take 10 mg by mouth daily. Yes Provider, Historical  
lisinopril-hydroCHLOROthiazide (PRINZIDE, ZESTORETIC) 20-25 mg per tablet Take 1 Tablet by mouth daily. Yes Provider, Historical  
ferrous sulfate 325 mg (65 mg iron) tablet Take 325 mg by mouth three (3) times daily (with meals). Yes Provider, Historical  
tamsulosin (Flomax) 0.4 mg capsule Take 0.4 mg by mouth daily. Yes Provider, Historical  
omeprazole (PRILOSEC) 40 mg capsule Take 40 mg by mouth daily. Yes Provider, Historical  
pomalidomide 3 mg cap Take 3 mg by mouth daily. He takes 3 mg daily for 21 days with 1 week off. Indications: multiple myeloma   Yes Provider, Historical  
predniSONE (DELTASONE) 10 mg tablet Take  by mouth daily (with breakfast).  taper   Yes Provider, Historical  
doxycycline (MONODOX) 100 mg capsule Take 100 mg by mouth two (2) times a day. Yes Provider, Historical  
guaiFENesin (ROBITUSSIN) 100 mg/5 mL liquid Take 200 mg by mouth three (3) times daily as needed for Cough. Provider, Historical  
  
Allergies Allergen Reactions  Sulfa (Sulfonamide Antibiotics) Itching Social History Tobacco Use  Smoking status: Former Smoker  Smokeless tobacco: Never Used Substance Use Topics  Alcohol use: Not Currently History reviewed. No pertinent family history. Documented Home Medications: 
Prior to Admission medications Medication Sig Start Date End Date Taking? Authorizing Provider FLUoxetine (PROzac) 40 mg capsule Take 40 mg by mouth daily. Yes Provider, Historical  
acyclovir (ZOVIRAX) 400 mg tablet Take 400 mg by mouth two (2) times a day. Yes Provider, Historical  
folic acid (FOLVITE) 1 mg tablet Take 1 mg by mouth daily. Yes Provider, Historical  
amLODIPine (NORVASC) 10 mg tablet Take 10 mg by mouth daily. Yes Provider, Historical  
lisinopril-hydroCHLOROthiazide (PRINZIDE, ZESTORETIC) 20-25 mg per tablet Take 1 Tablet by mouth daily. Yes Provider, Historical  
ferrous sulfate 325 mg (65 mg iron) tablet Take 325 mg by mouth three (3) times daily (with meals). Yes Provider, Historical  
tamsulosin (Flomax) 0.4 mg capsule Take 0.4 mg by mouth daily. Yes Provider, Historical  
omeprazole (PRILOSEC) 40 mg capsule Take 40 mg by mouth daily. Yes Provider, Historical  
pomalidomide 3 mg cap Take 3 mg by mouth daily. He takes 3 mg daily for 21 days with 1 week off. Indications: multiple myeloma   Yes Provider, Historical  
predniSONE (DELTASONE) 10 mg tablet Take  by mouth daily (with breakfast). taper   Yes Provider, Historical  
doxycycline (MONODOX) 100 mg capsule Take 100 mg by mouth two (2) times a day.    Yes Provider, Historical  
guaiFENesin (ROBITUSSIN) 100 mg/5 mL liquid Take 200 mg by mouth three (3) times daily as needed for Cough. Provider, Historical  
  
 
Allergies: Allergies Allergen Reactions  Sulfa (Sulfonamide Antibiotics) Itching Labs/Imaging:  
Recent Labs 05/31/21 
1402 PHI 7.40 PCO2I 52.3*  
PO2I 36* HCO3I 32.7* SO2I 67.9*  
FIO2I 70 Recent Labs 05/31/21 
0009 WBC 28.5* HGB 13.1 HCT 40.7  MCV 95.1 MCH 30.6 Recent Labs  
  06/01/21 
0520 05/31/21 
0009 05/30/21 
0404  137 138  
K 4.5 4.2 3.8  100 98 CO2 30 30 35* * 253* 140* BUN 31* 36* 35* CREA 0.76 0.94 0.90  
CA 7.9* 7.9* 7.7* ALB  --  2.4*  --   
 
DUPLEX LOWER EXT VENOUS BILAT 
· No evidence of thrombosis in either lower extremity. I personally reviewed laboratory testing, pulmonary imaging, radiology reports, and pulse oximetry data. Signature:  
Eugenia Mancilla [place]  
6/1/2021

## 2021-06-01 NOTE — PROGRESS NOTES
2000 Report received from LANE RUSS RN. Patient resting quietly, sitting on side of bed, BiPAP on satting at 100%. Patient given ensure drink, no other needs expressed. Problem: Gas Exchange - Impaired Goal: Absence of hypoxia Outcome: Progressing Towards Goal 
  
Problem: Falls - Risk of 
Goal: *Absence of Falls Description: Document Edgardo Chavez Fall Risk and appropriate interventions in the flowsheet. Outcome: Progressing Towards Goal 
Note: Fall Risk Interventions: 
Mobility Interventions: Communicate number of staff needed for ambulation/transfer Medication Interventions: Teach patient to arise slowly, Patient to call before getting OOB Elimination Interventions: Call light in reach, Patient to call for help with toileting needs Problem: Breathing Pattern - Ineffective Goal: *PALLIATIVE CARE:  Alleviation of Dyspnea Outcome: Progressing Towards Goal 
  
 Hourly rounding done, patient using urinal at bedside, no reports of pain throughout shift. 8145 Bedside shift change report given to DB Staley by Rea Churchill RN. Report included the following information SBAR, Kardex, MAR, Accordion, and Recent Results.

## 2021-06-01 NOTE — PROGRESS NOTES
NUTRITION Reason for Assessment: LOS Recommendations/plan:  
Continue current diet, ONS Ensure Enlive with meals. Will add Magic Cup for variety. Will rescreen per policy Past Medical History:  
Diagnosis Date  Hypertension  Multiple myeloma ([facility] [address] Pt screened for LOS. Chart/labs/meds reviewed. Admitted with Acute hypoxemic respiratory failure ([facility] [address] [J96.01] Pneumonia due to COVID-19 virus [U07.1, J12.82]. Discussed in rounds. Pt with overall fair PO - on continuous BiPAP, but able to come off for meals. Taking some supplements. Unable to reach via phone. No wounds, no edema, last BM 5/31 - formed. Noted elevated BG, no hx of DM, but appears some insulin resistance PTA given HbA1C%. Currently on IV solumedrol, 5 units of lantus, and correctional scale. Given fair PO and use of supplements for concentrated energy while requiring BiPAP, will continue Ensure Enlive. Trial Magic Cup for some variety as well. Noted wife also hospitalized with COVID as well. Current Nutrition Therapies: 
Diet: regular Supplements: Ensure Enlive TID with meals Meal intake:  
Patient Vitals for the past 168 hrs: 
 % Diet Eaten 05/31/21 1600 51 - 75% 05/31/21 1200 76 - 100% 05/31/21 0800 51 - 75% 05/30/21 1200 26 - 50% 05/28/21 1017 51 - 75% Supplement intake: No data found. Anthropometric Measures: 
· Height:  5' 11\" (180.3 cm) · Current Body Wt:  98.1 kg (216 lb 4.3 oz) · Ideal Body Wt:  172 lbs:  125.7 % · BMI Category:  Obese class 1 (BMI 30.0-34. 9) Wt Readings from Last 10 Encounters:  
06/01/21 98.1 kg (216 lb 4.3 oz) Nutrition Diagnosis:  
· Increased nutrient needs related to increased demand for energy/nutrients, impaired respiratory function as evidenced by  (COVID, use of BiPAP) Estimated Daily Nutrient Needs: 
Energy (kcal):  3619-4986 (MSJ x 1.2 x 1.1-1.3) Protein (g):  115-145 (1.2-1.5 gm/kg or at least 20%) Fluid (ml/day):  1 mL/kcal 
Weight used: 98 kg (current) Recent Labs  
  06/01/21 
0520 05/31/21 
0009 05/30/21 
0404 * 253* 140* BUN 31* 36* 35* CREA 0.76 0.94 0.90  137 138  
K 4.5 4.2 3.8  100 98 CO2 30 30 35* CA 7.9* 7.9* 7.7* Recent Labs  
  06/01/21 
1251 06/01/21 
0821 05/31/21 
2258 05/31/21 
1622 05/31/21 
1051 GLUCPOC 255* 173* 285* 188* 225* Lab Results Component Value Date/Time Hemoglobin A1c 6.2 (H) 05/31/2021 12:09 AM  
 
 
 
Lamberto Thrasher RD Available via [employer]

## 2021-06-02 NOTE — PROGRESS NOTES
PULMONARY MEDICINE Progress Note Name: Sheridan Jorge : 1945 MRN: [id number] Date: 2021 Subjective:  
 
 Remains on continuous NIV 
CRP down  
probnp is still elevated at 585 
procal 0.11 
D dimer 5.68 
 
 Back on continuous NIV due to WOB and hypoxemia D dimer trending down to 14.11 Feels ok, but no real improvement per patient  On 60lpm and 95% Fio2; saturations hanging around 90% No WOB; he feels a little better today D dimer 17.31 
CRP 1.66, down 
probnp 366, down  CRP 5.05 
probnp 446 
procal 0.11 
D dimer 14.32 Now on continuous NIV, desaturations off NIV IL6 1021 LE dopplers pending Vit d 34.9, ok O + blood type  On 60lpm and 100% Fio2; sats low 90s and upper 80s. Looks unlabored Consult Note:  Requesting Physician: Dr. Estrella Taveras Reason for consult: Resp failure Medical records and data reviewed. Patient is a 78 y.o. male who presented to the hospital with shortness of breath. Patient was seen in consultation for acute respiratory failure. He presented to the emergency room on the  complaining of rectal bleeding and increasing shortness of breath. He was found to be hypoxic in the Emergency Room placed on oxygen support. There is a history of positive covid test by home kit along with several members of his family also positive for Covid 19. Patient received [employer] vaccine approximately 2 weeks ago. Been evaluated earlier today, he was on BiPAP with high FiO2 and moderate work of breathing. Patient has been started on REM does abated and is on empiric steroids. Given progressive respiratory failure, order for echo was placed. Inflammatory markers are being monitored. D-dimer will be checked. He may be a candidate for ASPEN clinical trial if d-dimer is elevated. Information was provided to him and clinical study explained. Patient has underlying history of multiple myeloma with intermittent rectal bleeding. Hemoglobin is stable and he is receiving Lovenox prophylactically. Review of Systems: A comprehensive 12 system review of systems was limited from patient Assessment:  
 
Acute hypoxic respiratory failure Pneumonia due to Covid 19 virus with cytokine storm; s/p J&J vaccine approx 2 wks ago History of multiple myeloma Former smoker, 20-30 years. No formal dx of COPD Rectal bleeding with stable hemoglobin, tolerating Lovenox Other medical problems per chart Recommendations:  
 
O2 titration above 90%, on NIV and high flow Monitor d dimer, probnp and inflammatory markers Prone > 8 hours Lasix, increase to bid and continue for now 
ok IgG subclasses Solu medrol; has been reduced  
rendesivir, stopped Chest x-ray today  
therapeutic lovenox for now. CTA can only be done once he is more stable   
s/p tocilizumab on 5/23 the Aspen clinical trial; per Dr Sharlene Dowell Discussed with hospitalist  
Patient is critically ill and at high risk for further decline, mechanical ventilation, and ICU admission due to acute hypoxic resp failure and COVID19 PNA; CC time spent excluding procedures is > 35 minutes Active Problem List:  
 
Problem List  Never Reviewed Codes Class Acute hypoxemic respiratory failure (HCC) ICD-10-CM: J96.01 
ICD-9-CM: 518.81 Pneumonia due to COVID-19 virus ICD-10-CM: U07.1, J12.82 ICD-9-CM: 480.8, 079.89 Past Medical History:  
 
 has a past medical history of Hypertension and Multiple myeloma ([facility] [address] Past Surgical History:  
 
 has a past surgical history that includes hx cholecystectomy. Home Medications:  
 
Prior to Admission medications Medication Sig Start Date End Date Taking? Authorizing Provider FLUoxetine (PROzac) 40 mg capsule Take 40 mg by mouth daily. Yes Provider, Historical  
acyclovir (ZOVIRAX) 400 mg tablet Take 400 mg by mouth two (2) times a day.    Yes Provider, Historical  
folic acid (FOLVITE) 1 mg tablet Take 1 mg by mouth daily. Yes Provider, Historical  
amLODIPine (NORVASC) 10 mg tablet Take 10 mg by mouth daily. Yes Provider, Historical  
lisinopril-hydroCHLOROthiazide (PRINZIDE, ZESTORETIC) 20-25 mg per tablet Take 1 Tablet by mouth daily. Yes Provider, Historical  
ferrous sulfate 325 mg (65 mg iron) tablet Take 325 mg by mouth three (3) times daily (with meals). Yes Provider, Historical  
tamsulosin (Flomax) 0.4 mg capsule Take 0.4 mg by mouth daily. Yes Provider, Historical  
omeprazole (PRILOSEC) 40 mg capsule Take 40 mg by mouth daily. Yes Provider, Historical  
pomalidomide 3 mg cap Take 3 mg by mouth daily. He takes 3 mg daily for 21 days with 1 week off. Indications: multiple myeloma   Yes Provider, Historical  
predniSONE (DELTASONE) 10 mg tablet Take  by mouth daily (with breakfast). taper   Yes Provider, Historical  
doxycycline (MONODOX) 100 mg capsule Take 100 mg by mouth two (2) times a day. Yes Provider, Historical  
guaiFENesin (ROBITUSSIN) 100 mg/5 mL liquid Take 200 mg by mouth three (3) times daily as needed for Cough. Provider, Historical  
 
 
Allergies/Social/Family History: Allergies Allergen Reactions  Sulfa (Sulfonamide Antibiotics) Itching Social History Tobacco Use  Smoking status: Former Smoker  Smokeless tobacco: Never Used Substance Use Topics  Alcohol use: Not Currently History reviewed. No pertinent family history. Objective:  
Vital Signs: 
Visit Vitals BP (!) 146/85 (BP 1 Location: Left upper arm, BP Patient Position: At rest) Pulse 60 Temp 97.6 °F (36.4 °C) Resp 23 Ht 5' 11\" (1.803 m) Wt 98.1 kg (216 lb 4.3 oz) SpO2 99% BMI 30.16 kg/m² O2 Flow Rate (L/min): 50 l/min O2 Device: BIPAP Temp (24hrs), Av.8 °F (36.6 °C), Min:97.6 °F (36.4 °C), Max:98.3 °F (36.8 °C) Intake/Output:  
 
Intake/Output Summary (Last 24 hours) at 2021 1311 Last data filed at 2021 9630 Gross per 24 hour Intake  Output 1120 ml Net -1120 ml Physical Exam:  
General:  Alert, cooperative, on bipap, tachypneic, appears stated age. Head:  Normocephalic, without obvious abnormality, atraumatic. Eyes:  Conjunctivae/corneas clear. PERRL Neck: Supple, symmetrical, no adenopathy, no carotid bruit and no JVD. Lungs:   Diminished BS Chest wall:  No tenderness or deformity. Heart:  Regular rate and rhythm, S1-S2 normal, no murmur, no click, rub or gallop. Abdomen:   Soft, non-tender. Bowel sounds present. No masses,  No organomegaly. Extremities: Atraumatic, no cyanosis or edema. Pulses: Palpable Skin: No rashes or lesions Neurologic: Grossly nonfocal  
 
  
 LABS AND  DATA: Personally reviewed Recent Labs  
  06/02/21 
0133 05/31/21 
0009 WBC 30.8* 28.5* HGB 14.1 13.1 HCT 44.3 40.7  362 Recent Labs  
  06/01/21 
0520 05/31/21 
0009  137  
K 4.5 4.2  100 CO2 30 30 BUN 31* 36* CREA 0.76 0.94 * 253* CA 7.9* 7.9* Recent Labs 05/31/21 
0009 ALB 2.4* No results for input(s): INR, PTP, APTT, INREXT, INREXT in the last 72 hours. Recent Labs 05/31/21 
1402 PHI 7.40 PCO2I 52.3*  
PO2I 36* FIO2I 70 No results for input(s): CPK, CKMB, TROIQ, BNPP in the last 72 hours. MEDS: Reviewed Chest Imaging: personally reviewed and report checked Tele- reviewed Medical decision making:  
I have reviewed the flowsheet and previous day's notes Patient has acute or chronic illness that poses a threat to life or bodily function Review and order of Clinical lab tests Review and Order of Radiology tests Independent visualization of Image Reviewed Oxygen/ NiPPV High Risk Drug therapy requiring intensive monitoring for toxicity: eg steroids, pressors, antibiotics CCT 75' Thank you for allowing me to participate in this patient's care. Tressa Blankenship PA-C [facility]

## 2021-06-02 NOTE — PROGRESS NOTES
Pt has access but primary RN unable to get labs, pt has cultures ordered, unable to place PICC line today, primary nurse notified

## 2021-06-02 NOTE — PROGRESS NOTES
Bedside shift change report given to [address] (oncoming nurse) by Chao Mercado (offgoing nurse). Report included the following information SBAR, Kardex, ED Summary, [facility] and Recent Results.

## 2021-06-02 NOTE — PROGRESS NOTES
Per pt's dtr, pt's oncologist at [facility] Dr. Ingrid Forrester. Progress notes state that records have been requested from oncology at [facility]. Writer notified Dr. Mari Bruner of correct oncologist's information.

## 2021-06-02 NOTE — PROGRESS NOTES
Bedside shift change report given to Maribell (oncoming nurse) by Mariah Nielsen (offgoing nurse).  Report included the following information SBAR, Kardex, Intake/Output, MAR, Recent Results and Cardiac Rhythm SR.

## 2021-06-02 NOTE — PROGRESS NOTES
Writer switched patient from BiPap to high flow oxygen at 57L/min so that he could eat breakfast, pt's oxygen saturation quickly dropped to 80%, pt dyspnic and anxious. Pt placed back on Bipap and O2 sats back > 95%. MD notified, respiratory therapist recommended proning patient, writer recommended to pt but he declined.

## 2021-06-02 NOTE — PROGRESS NOTES
[facility] [facility] Adult  Hospitalist Group Hospitalist Progress Note Cristhian Chauhan MD 
Answering service: [phone] OR [phone] from in house phone Date of Service:  2021 NAME:  Sheridan Jorge :  1945 MRN:  [id number] Admission Summary: This is a 33-year-old gentleman who is currently undergoing treatment for multiple myeloma was brought to the ED for rectal bleed and shortness of breath. Upon arrival of EMS at his home, patient was found to be in respiratory distress and hypoxic with SPO2 in the 70s and he was put on NRB and brought to the ED. In the ED hospital on high flow oxygen. Interval history / Subjective:  
   
Patient remains on continuous BiPAP. He is alert and oriented and communicative. No overnight events. NAD. Able to eat briefly off bipap. No acute complaints. He is feeling a little better overall. Assessment & Plan:  
Acute hypoxic respiratory failure Bilateral Covid19 pneumonia Severe sepsis (presented with hypoxic respiratory failure, lactic acidosis, leukocytosis) secondary to bilateral pneumonia 
-On continuous BiPAP, wean as able 
-On dexamethasone, wean. s/p empiric antibiotics. Received Actemra 
-Lovenox switched to empiric full dose due to increased suspicion for VTE, awaiting CTA to r/o PE 
-Stopped remdesivir due to worsening bradycardia 
-trend CRP, ddimer 
-resume lasix per pulm -appreciate pulm 
-albuterol Leukocytosis 2/2 steroids Chronic intermittent rectal bleeding related to his cancer and chemotherapy. Rectal bleed improved with recent switch of his chemotherapy Pomalyst per his wife. 
-No rectal bleeding since admission 
-hgb stable Bradycardia. Improved after stopping remdesivir Hypokalemia: Replaced Mag ok History of multiple myeloma: On Pomalyst 3 mg daily for 21 days with 7 days off.     Patient usually follows at [facility] on hold, oncology agreed Hypertension, improving, cont home norvasc Continue PTA acyclovir, folic acid, Prozac and tamsulosin Patient's Baseline: Ambulates with walking DVT ppx: Lovenox tx empiric Code status: full Disposition: TBD 
wife Marco Lawton [phone], also admitted here for covid Anticipated Discharge: Greater than 48 hours Hospital Problems  Never Reviewed Codes Class Noted POA Acute hypoxemic respiratory failure ([facility] [address] ICD-10-CM: J96.01 
ICD-9-CM: 518.81  5/22/2021 Unknown Pneumonia due to COVID-19 virus ICD-10-CM: U07.1, J12.82 ICD-9-CM: 480.8, 079.89  5/22/2021 Unknown Review of Systems: A comprehensive review of systems was negative except for that written in the HPI. Vital Signs:  
 Last 24hrs VS reviewed since prior progress note. Most recent are: 
Visit Vitals /76 (BP 1 Location: Left upper arm) Pulse 65 Temp 97.6 °F (36.4 °C) Resp 23 Ht 5' 11\" (1.803 m) Wt 98.1 kg (216 lb 4.3 oz) SpO2 95% BMI 30.16 kg/m² Intake/Output Summary (Last 24 hours) at 6/2/2021 7331 Last data filed at 6/2/2021 6693 Gross per 24 hour Intake  Output 1420 ml Net -1420 ml Physical Examination:  
 
I had a face to face encounter with this patient and independently examined them on6/2/2021 as outlined below: 
     
Constitutional:  sitting up in bed, on BiPAP. Alert HEENT:  Atraumatic. Oral mucosa moist,. Non icteric sclera. No pallor. Resp:  On BiPAP, good air mvmt, clear CV:  Regular rhythm GI:  Soft, non distended, non tender Musculoskeletal:  No edema, warm Neurologic:  REZA, A&Ox3 Data Review:  
 Review and/or order of clinical lab test 
Review and/or order of tests in the radiology section of CPT Review and/or order of tests in the medicine section of CPT Labs:  
 
Recent Labs  
  06/02/21 
0133 05/31/21 
0009 WBC 30.8* 28.5* HGB 14.1 13.1 HCT 44.3 40.7  362 Recent Labs 06/01/21 
9666 05/31/21 
0009  137  
K 4.5 4.2  100 CO2 30 30 BUN 31* 36* CREA 0.76 0.94 * 253* CA 7.9* 7.9* Recent Labs 05/31/21 
0009 ALB 2.4* No results for input(s): INR, PTP, APTT, INREXT, INREXT in the last 72 hours. No results for input(s): FE, TIBC, PSAT, FERR in the last 72 hours. No results found for: FOL, RBCF No results for input(s): PH, PCO2, PO2 in the last 72 hours. No results for input(s): CPK, CKNDX, TROIQ in the last 72 hours. No lab exists for component: CPKMB No results found for: CHOL, CHOLX, CHLST, CHOLV, HDL, HDLP, LDL, LDLC, DLDLP, TGLX, TRIGL, TRIGP, CHHD, CHHDX Lab Results Component Value Date/Time Glucose (POC) 162 (H) 06/02/2021 08:27 AM  
 Glucose (POC) 111 06/01/2021 10:01 PM  
 Glucose (POC) 102 06/01/2021 05:56 PM  
 Glucose (POC) 255 (H) 06/01/2021 12:51 PM  
 Glucose (POC) 173 (H) 06/01/2021 08:21 AM  
 
No results found for: COLOR, APPRN, SPGRU, REFSG, BRYANNA, PROTU, GLUCU, KETU, BILU, UROU, DANNY, LEUKU, GLUKE, EPSU, BACTU, WBCU, RBCU, CASTS, UCRY Medications Reviewed:  
 
Current Facility-Administered Medications Medication Dose Route Frequency  methylPREDNISolone (PF) (SOLU-MEDROL) injection 60 mg  60 mg IntraVENous Q12H  
 insulin lispro (HUMALOG) injection   SubCUTAneous AC&HS  
 glucose chewable tablet 16 g  4 Tablet Oral PRN  
 dextrose (D50W) injection syrg 12.5-25 g  12.5-25 g IntraVENous PRN  
 glucagon (GLUCAGEN) injection 1 mg  1 mg IntraMUSCular PRN  
 insulin glargine (LANTUS) injection 5 Units  5 Units SubCUTAneous DAILY  amLODIPine (NORVASC) tablet 5 mg  5 mg Oral DAILY  guaiFENesin (ROBITUSSIN) 100 mg/5 mL oral liquid 100 mg  100 mg Oral Q4H PRN  
 albuterol (PROVENTIL HFA, VENTOLIN HFA, PROAIR HFA) inhaler 2 Puff  2 Puff Inhalation TID RT  
 enoxaparin (LOVENOX) injection 100 mg  100 mg SubCUTAneous Q12H  
 fluticasone propionate (FLONASE) 50 mcg/actuation nasal spray 2 Spray  2 Spray Both Nostrils QHS  sodium chloride (OCEAN) 0.65 % nasal squeeze bottle 2 Spray  2 Spray Both Nostrils Q2H PRN  
 sodium chloride (NS) flush 5-40 mL  5-40 mL IntraVENous Q8H  
 sodium chloride (NS) flush 5-40 mL  5-40 mL IntraVENous PRN  
 sodium chloride (NS) flush 5-40 mL  5-40 mL IntraVENous Q8H  
 sodium chloride (NS) flush 5-40 mL  5-40 mL IntraVENous PRN  
 acetaminophen (TYLENOL) tablet 650 mg  650 mg Oral Q6H PRN Or  
 acetaminophen (TYLENOL) suppository 650 mg  650 mg Rectal Q6H PRN  polyethylene glycol (MIRALAX) packet 17 g  17 g Oral DAILY PRN  promethazine (PHENERGAN) tablet 12.5 mg  12.5 mg Oral Q6H PRN Or  
 ondansetron (ZOFRAN) injection 4 mg  4 mg IntraVENous Q6H PRN  
 acyclovir (ZOVIRAX) tablet 400 mg  400 mg Oral BID  ferrous sulfate tablet 325 mg  1 Tablet Oral TID WITH MEALS  
 FLUoxetine (PROzac) capsule 40 mg  40 mg Oral DAILY  folic acid (FOLVITE) tablet 1 mg  1 mg Oral DAILY  tamsulosin (FLOMAX) capsule 0.4 mg  0.4 mg Oral DAILY  
 
______________________________________________________________________ EXPECTED LENGTH OF STAY: 4d 19h ACTUAL LENGTH OF STAY:          Phillip Leo MD

## 2021-06-03 NOTE — PROGRESS NOTES
Rapid Response Documentation Name: Diane Chavez YOB: 1945 MRN: [id number] Admission Date: 5/22/2021  1:40 PM   
 
Date of service: 6/3/2021 Active Diagnoses: 
 
Hospital Problems  Never Reviewed Codes Class Noted POA [facility] ICD-10-CM: J96.01 
ICD-9-CM: 518.81  5/22/2021 Unknown Pneumonia due to COVID-19 virus ICD-10-CM: U07.1, J12.82 ICD-9-CM: 480.8, 079.89  5/22/2021 Unknown Chief Complaint: 
 
Difficulty breathing Clinical Presentation: 
 
Patient is a 80-year-old male admitted 5/22/2021 for acute hypoxic respiratory failure due to bilateral COVID-19 pneumonia and severe sepsis. Past medical history significant for multiple myeloma and hypertension. Patient had been resting comfortably BiPAP maintaining saturation in the high 90s. He remove the mask to use bedside commode and became short of breath after returning to the bed. BiPAP mask was replaced and his oxygen saturation quickly maty to the high 90s, but he continued to be tachypneic and states he could not breathe. He denied chest pain, palpitations and abdominal pain. Physical Exam: · Alert and oriented. Appears anxious. · Work of breathing is not labored. Respiratory rate is elevated however · Chest is clear, no rales, rhonchi or wheezing · Abdomen is soft and nontender. Patient is not belly breathing Patient Vitals for the past 12 hrs: 
 Temp Pulse Resp BP SpO2  
06/03/21 0223     98 % 06/02/21 2310 98.6 °F (37 °C) 77 19 110/71 97 % 06/02/21 1950     94 % 06/02/21 1948 (!) 96.6 °F (35.9 °C) 77 28 120/67   
06/02/21 1748  68  128/73   
06/02/21 1536 97.6 °F (36.4 °C) 81 20 (!) 144/79 96 % Data Reviewed: All diagnostic labs and studies have been reviewed. Assessment and Plan: 
 
Acute exacerbation of hypoxic respiratory failure precipitated by activity/anxiety · Now back on BiPAP and doing well · Physical exam not concerning. Breath sounds are excellent · No chest pain or palpitations. · Patient agrees to Ativan 1 mg. · Continue current plan of care. Monitor Reese Lozoya, MSN, RN, NP-C 
[phone] or via [employer] 6/3/2021

## 2021-06-03 NOTE — PROGRESS NOTES
Report given to [address]. Report consisted of patients Situation, Background, Assessment and Recommendations.

## 2021-06-03 NOTE — PROGRESS NOTES
Problem: Loneliness or Risk for Loneliness Goal: Demonstrate positive use of time alone when socialization is not possible Outcome: Progressing Towards Goal 
  
Problem: Fatigue Goal: Verbalize increase energy and improved vitality Outcome: Progressing Towards Goal 
  
Problem: Falls - Risk of 
Goal: *Absence of Falls Description: Document Ang Shove Fall Risk and appropriate interventions in the flowsheet. Outcome: Progressing Towards Goal 
Note: Fall Risk Interventions: 
Mobility Interventions: Bed/chair exit alarm, Assess mobility with egress test, Patient to call before getting OOB Medication Interventions: Bed/chair exit alarm, Patient to call before getting OOB, Evaluate medications/consider consulting pharmacy Elimination Interventions: Call light in reach, Urinal in reach, Toileting schedule/hourly rounds, Toilet paper/wipes in reach Problem: Pressure Injury - Risk of 
Goal: *Prevention of pressure injury Description: Document Ari Scale and appropriate interventions in the flowsheet. Outcome: Progressing Towards Goal 
Note: Pressure Injury Interventions: 
Sensory Interventions: Assess changes in LOC Moisture Interventions: Absorbent underpads, Minimize layers Activity Interventions: Increase time out of bed, PT/OT evaluation Mobility Interventions: HOB 30 degrees or less, Pressure redistribution bed/mattress (bed type) Nutrition Interventions: Document food/fluid/supplement intake Friction and Shear Interventions: Minimize layers Problem: Nutrition Deficit Goal: *Optimize nutritional status Outcome: Progressing Towards Goal

## 2021-06-03 NOTE — PROGRESS NOTES
At approximately 0200, pt called out for assistance to [facility]. RN helped pt to [facility], while assisting pt back to bed, pt started coughing and struggling to breath and stated \" I can't catch my breath\". O2 sats dropped to high 70's to low 80's and pt experienced sudden onset of weakness. Rapid response called. 1: RN attempted to draw labs, pt said he would only allow one try. Pt is a hard stick so RN asked charge to assist with labs. Charge nurse unsuccessful with labs and pt would not allow us to retry. Will continue to monitor. 0800: Bedside shift change report given to Maribell (oncoming nurse) by Shola Jay (offgoing nurse). Report included the following information SBAR, Kardex, MAR, Recent Results and Cardiac Rhythm NSR.

## 2021-06-03 NOTE — PROGRESS NOTES
Transition Plan of Care RUR 20%-Med Disposition- baseline he resides at home with wife. Will need evaluation by PT/OT to determine level of care needed at discharge. Continues to require hi-flow  At 60 lpm. Difficult  at this time to work with rehab secondary to high oxygen demand. CM will follow for needs once stable. Theron Sevilla RN CRM Ext [phone]

## 2021-06-03 NOTE — PROCEDURES
PICC Placement Note PRE-PROCEDURE VERIFICATION Correct Procedure: yes Correct Site:  yes Temperature: Temp: 98.1 °F (36.7 °C), Temperature Source: Temp Source: Axillary Recent Labs  
  06/03/21 
0955 06/02/21 
0133 BUN 32*  --   
CREA 0.89  --   
PLT  --  334 WBC  --  30.8* Allergies: Sulfa (sulfonamide antibiotics) Education materials, including PICC Booklet and written information regarding central catheter related bloodstream infection and prevention given to patient. See Patient Education activity for further details. PROCEDURE DETAIL A triple lumen power injectable PICC line was started for reliable vascular access. The following documentation is in addition to the PICC properties in the lines/airways flowsheet : 
Lot #: [id number] Xylocaine 1% used intradermally:  yes Total Catheter Length: 39 (cm) External Catheter Length: 1 (cm) Circumference: 31.5 (cm) Vein Selection for PICC: right basilic Central Line Bundle followed: yes Complication Related to Insertion: none The placement was verified by ECG technology. The PICC is on the right side and the tip overlies the superior vena cava. ECG verification documentation is on the patient's paper chart. Line is okay to use. Report to Wendi Casanova. OLGA RamiresN, RN, [facility]-BC  Vascular Access Team

## 2021-06-03 NOTE — PROGRESS NOTES
Asked patient would he like to trail HFNC at bedside, patient states \"no not now\" 1154:HFNC initiated at this time, encouraged patient to take slow deep breaths O2 saturation 92% HR 69 RR 11 on FIO2 1.0 60LPM. Patient states \"I feel okay I just want to breathe\" Will continue to monitor patient. Will continue to monitor patient.

## 2021-06-03 NOTE — PROGRESS NOTES
2000 Report received from \[facility]\"". Patient resting in bed, no needs expressed. 2100 Patient assisted to bedside commode and began desatting after large bowel movement while on BiPAP. Patient yelling \"I cant do this, just intubate me. .\" Charge nurse stayed in room with patient and helped him calm down. Altamese Half, NP and orders were given for PRN ativan. Will continue to monitor. 0320 Patient placed on HFNC after he took himself off BiPAP while sleeping, O2 sats 98%, VSS. 6182 Bedside shift change report given to Sweta RN by Brenda Irby RN. Report included the following information SBAR, Kardex, MAR, Accordion, and Recent Results.

## 2021-06-03 NOTE — PROGRESS NOTES
PULMONARY MEDICINE Progress Note Name: Julee Uribe : 1945 MRN: [id number] Date: 6/3/2021 Subjective:  
 
6/3 On NIV, but suspect could transition to High flow Discussed with RN and they will see about attempting today WBC up 
CRP down 
probnp 585 Chest film is stable, no worse / Remains on continuous NIV 
CRP down  
probnp is still elevated at 585 
procal 0.11 
D dimer 5.68 
 
 Back on continuous NIV due to WOB and hypoxemia D dimer trending down to 14.11 Feels ok, but no real improvement per patient  On 60lpm and 95% Fio2; saturations hanging around 90% No WOB; he feels a little better today D dimer 17.31 
CRP 1.66, down 
probnp 366, down  CRP 5.05 
probnp 446 
procal 0.11 
D dimer 14.32 Now on continuous NIV, desaturations off NIV IL6 1021 LE dopplers pending Vit d 34.9, ok O + blood type  On 60lpm and 100% Fio2; sats low 90s and upper 80s. Looks unlabored Consult Note:  Requesting Physician: Dr. Alton Segal Reason for consult: Resp failure Medical records and data reviewed. Patient is a 78 y.o. male who presented to the hospital with shortness of breath. Patient was seen in consultation for acute respiratory failure. He presented to the emergency room on the  complaining of rectal bleeding and increasing shortness of breath. He was found to be hypoxic in the Emergency Room placed on oxygen support. There is a history of positive covid test by home kit along with several members of his family also positive for Covid 19. Patient received [employer] vaccine approximately 2 weeks ago. Been evaluated earlier today, he was on BiPAP with high FiO2 and moderate work of breathing. Patient has been started on REM does abated and is on empiric steroids. Given progressive respiratory failure, order for echo was placed. Inflammatory markers are being monitored. D-dimer will be checked.  He may be a candidate for ASPEN clinical trial if d-dimer is elevated. Information was provided to him and clinical study explained. Patient has underlying history of multiple myeloma with intermittent rectal bleeding. Hemoglobin is stable and he is receiving Lovenox prophylactically. Review of Systems: A comprehensive 12 system review of systems was limited from patient Assessment:  
 
Acute hypoxic respiratory failure Pneumonia due to Covid 19 virus with cytokine storm; s/p J&J vaccine approx 2 wks ago History of multiple myeloma Former smoker, 20-30 years. No formal dx of COPD Rectal bleeding with stable hemoglobin, tolerating Lovenox Other medical problems per chart Recommendations:  
 
O2 titration above 90%, on NIV and high flow Monitor d dimer, probnp and inflammatory markers Prone > 8 hours Lasix,bid lasix  
ok IgG subclasses Solu medrol; reduce 
remdesivir, stopped  
therapeutic lovenox for now. CTA can only be done once he is more stable   
s/p tocilizumab on 5/23 the Aspen clinical trial; per Dr Chris Man May need palliative care involvement Patient is critically ill and at high risk for further decline, mechanical ventilation, and ICU admission due to acute hypoxic resp failure and COVID19 PNA; CC time spent excluding procedures is > 35 minutes Active Problem List:  
 
Problem List  Never Reviewed Codes Class Acute hypoxemic respiratory failure (HCC) ICD-10-CM: J96.01 
ICD-9-CM: 518.81 Pneumonia due to COVID-19 virus ICD-10-CM: U07.1, J12.82 ICD-9-CM: 480.8, 079.89 Past Medical History:  
 
 has a past medical history of Hypertension and Multiple myeloma ([facility] [address] Past Surgical History:  
 
 has a past surgical history that includes hx cholecystectomy. Home Medications:  
 
Prior to Admission medications Medication Sig Start Date End Date Taking? Authorizing Provider FLUoxetine (PROzac) 40 mg capsule Take 40 mg by mouth daily.    Yes Provider, Historical  
acyclovir (ZOVIRAX) 400 mg tablet Take 400 mg by mouth two (2) times a day. Yes Provider, Historical  
folic acid (FOLVITE) 1 mg tablet Take 1 mg by mouth daily. Yes Provider, Historical  
amLODIPine (NORVASC) 10 mg tablet Take 10 mg by mouth daily. Yes Provider, Historical  
lisinopril-hydroCHLOROthiazide (PRINZIDE, ZESTORETIC) 20-25 mg per tablet Take 1 Tablet by mouth daily. Yes Provider, Historical  
ferrous sulfate 325 mg (65 mg iron) tablet Take 325 mg by mouth three (3) times daily (with meals). Yes Provider, Historical  
tamsulosin (Flomax) 0.4 mg capsule Take 0.4 mg by mouth daily. Yes Provider, Historical  
omeprazole (PRILOSEC) 40 mg capsule Take 40 mg by mouth daily. Yes Provider, Historical  
pomalidomide 3 mg cap Take 3 mg by mouth daily. He takes 3 mg daily for 21 days with 1 week off. Indications: multiple myeloma   Yes Provider, Historical  
predniSONE (DELTASONE) 10 mg tablet Take  by mouth daily (with breakfast). taper   Yes Provider, Historical  
doxycycline (MONODOX) 100 mg capsule Take 100 mg by mouth two (2) times a day. Yes Provider, Historical  
guaiFENesin (ROBITUSSIN) 100 mg/5 mL liquid Take 200 mg by mouth three (3) times daily as needed for Cough. Provider, Historical  
 
 
Allergies/Social/Family History: Allergies Allergen Reactions  Sulfa (Sulfonamide Antibiotics) Itching Social History Tobacco Use  Smoking status: Former Smoker  Smokeless tobacco: Never Used Substance Use Topics  Alcohol use: Not Currently History reviewed. No pertinent family history. Objective:  
Vital Signs: 
Visit Vitals BP (!) 142/78 (BP 1 Location: Right upper arm, BP Patient Position: At rest) Pulse 64 Temp 98.1 °F (36.7 °C) Resp 21 Ht 5' 11\" (1.803 m) Wt 98.3 kg (216 lb 11.4 oz) SpO2 96% BMI 30.23 kg/m² O2 Flow Rate (L/min): 50 l/min O2 Device: BIPAP Temp (24hrs), Av.8 °F (36.6 °C), Min:96.6 °F (35.9 °C), Max:98.6 °F (37 °C) Intake/Output:  
 
Intake/Output Summary (Last 24 hours) at 6/3/2021 1040 Last data filed at 6/2/2021 2000 Gross per 24 hour Intake  Output 301 ml Net -301 ml Physical Exam:  
General:  Alert, cooperative, on bipap, tachypneic, appears stated age. Head:  Normocephalic, without obvious abnormality, atraumatic. Eyes:  Conjunctivae/corneas clear. PERRL Neck: Supple, symmetrical, no adenopathy, no carotid bruit and no JVD. Lungs:   Diminished BS Chest wall:  No tenderness or deformity. Heart:  Regular rate and rhythm, S1-S2 normal, no murmur, no click, rub or gallop. Abdomen:   Soft, non-tender. Bowel sounds present. No masses,  No organomegaly. Extremities: Atraumatic, no cyanosis or edema. Pulses: Palpable Skin: No rashes or lesions Neurologic: Grossly nonfocal  
 
  
 LABS AND  DATA: Personally reviewed Recent Labs  
  06/02/21 
0133 WBC 30.8* HGB 14.1 HCT 44.3  Recent Labs  
  06/01/21 
0520   
K 4.5  
 CO2 30 BUN 31* CREA 0.76 * CA 7.9* No results for input(s): AP, TBIL, TP, ALB, GLOB, AML, LPSE in the last 72 hours. No lab exists for component: SGOT, GPT, AMYP No results for input(s): INR, PTP, APTT, INREXT, INREXT in the last 72 hours. Recent Labs 05/31/21 
1402 PHI 7.40 PCO2I 52.3*  
PO2I 36* FIO2I 70 No results for input(s): CPK, CKMB, TROIQ, BNPP in the last 72 hours. MEDS: Reviewed Chest Imaging: personally reviewed and report checked Tele- reviewed Medical decision making:  
I have reviewed the flowsheet and previous day's notes Patient has acute or chronic illness that poses a threat to life or bodily function Review and order of Clinical lab tests Review and Order of Radiology tests Independent visualization of Image Reviewed Oxygen/ NiPPV High Risk Drug therapy requiring intensive monitoring for toxicity: eg steroids, pressors, antibiotics CCT 75' Thank you for allowing me to participate in this patient's care. Nancy Fernandez PA-C [facility]

## 2021-06-04 NOTE — PROGRESS NOTES
Verbal bedside report given to [address] [place] [address] oncoming nurse by Nimesh Wadsworth. Mark RN off-going nurse. Report included current pt status and condition, recent results, hx of present illness, heart rate and rhythm, and respiratory status. [address] Pt unable to maintain sats above 87 on hiflow, moved back to BIPAP Pt successfully went to CT without reverting to BIPAP Pt moved from BIPAP to hi-flow during the night. satting in 90s, but getting periodic panic attacks. PRN ativan helps

## 2021-06-04 NOTE — PROGRESS NOTES
PULMONARY MEDICINE Progress Note Name: Pepe Veloz : 1945 MRN: [id number] Date: 2021 Subjective:  
 
 On 60lpm and 100% FiO2 Seems stable at the moment, however with any movement does desat D dimer 2 
probnp 821 CRP down He was quasi-proning when I can in to the room 6/3 On NIV, but suspect could transition to High flow Discussed with RN and they will see about attempting today WBC up 
CRP down 
probnp 585 Chest film is stable, no worse  Remains on continuous NIV 
CRP down  
probnp is still elevated at 585 
procal 0.11 
D dimer 5.68 
 
 Back on continuous NIV due to WOB and hypoxemia D dimer trending down to 14.11 Feels ok, but no real improvement per patient  On 60lpm and 95% Fio2; saturations hanging around 90% No WOB; he feels a little better today D dimer 17.31 
CRP 1.66, down 
probnp 366, down  CRP 5.05 
probnp 446 
procal 0.11 
D dimer 14.32 Now on continuous NIV, desaturations off NIV IL6 1021 LE dopplers pending Vit d 34.9, ok O + blood type  On 60lpm and 100% Fio2; sats low 90s and upper 80s. Looks unlabored Consult Note:  Requesting Physician: Dr. Aj Delcid Reason for consult: Resp failure Medical records and data reviewed. Patient is a 78 y.o. male who presented to the hospital with shortness of breath. Patient was seen in consultation for acute respiratory failure. He presented to the emergency room on the  complaining of rectal bleeding and increasing shortness of breath. He was found to be hypoxic in the Emergency Room placed on oxygen support. There is a history of positive covid test by home kit along with several members of his family also positive for Covid 19. Patient received [employer] vaccine approximately 2 weeks ago. Been evaluated earlier today, he was on BiPAP with high FiO2 and moderate work of breathing.  Patient has been started on REM does abated and is on empiric steroids. Given progressive respiratory failure, order for echo was placed. Inflammatory markers are being monitored. D-dimer will be checked. He may be a candidate for ASPEN clinical trial if d-dimer is elevated. Information was provided to him and clinical study explained. Patient has underlying history of multiple myeloma with intermittent rectal bleeding. Hemoglobin is stable and he is receiving Lovenox prophylactically. Review of Systems: A comprehensive 12 system review of systems was limited from patient Assessment:  
 
Acute hypoxic respiratory failure Pneumonia due to Covid 19 virus with cytokine storm; s/p J&J vaccine approx 2 wks ago History of multiple myeloma Former smoker, 20-30 years. No formal dx of COPD Rectal bleeding with stable hemoglobin, tolerating Lovenox Other medical problems per chart Recommendations:  
 
O2 titration above 90%, on NIV and high flow; lets see how he does just on high flow, NIV at night and increased WOB/hypoxemia Monitor d dimer, probnp and inflammatory markers Prone > 8 hours Lasix,bid lasix; may need to intensify further  
ok IgG subclasses Solu medrol; reduce as tolerated  
remdesivir, stopped  
therapeutic lovenox for now. CTA can only be done once he is more stable   
s/p tocilizumab on 5/23 the Aspen clinical trial; per Dr Dave Richards May need palliative care involvement Patient is critically ill and at high risk for further decline, mechanical ventilation, and ICU admission due to acute hypoxic resp failure and COVID19 PNA; CC time spent excluding procedures is > 35 minutes Active Problem List:  
 
Problem List  Never Reviewed Codes Class Acute hypoxemic respiratory failure (HCC) ICD-10-CM: J96.01 
ICD-9-CM: 518.81 Pneumonia due to COVID-19 virus ICD-10-CM: U07.1, J12.82 ICD-9-CM: 480.8, 079.89 Past Medical History:  
 
 has a past medical history of Hypertension and Multiple myeloma ([facility] [address]  
 
Past Surgical History:  
 
 has a past surgical history that includes hx cholecystectomy. Home Medications:  
 
Prior to Admission medications Medication Sig Start Date End Date Taking? Authorizing Provider FLUoxetine (PROzac) 40 mg capsule Take 40 mg by mouth daily. Yes Provider, Historical  
acyclovir (ZOVIRAX) 400 mg tablet Take 400 mg by mouth two (2) times a day. Yes Provider, Historical  
folic acid (FOLVITE) 1 mg tablet Take 1 mg by mouth daily. Yes Provider, Historical  
amLODIPine (NORVASC) 10 mg tablet Take 10 mg by mouth daily. Yes Provider, Historical  
lisinopril-hydroCHLOROthiazide (PRINZIDE, ZESTORETIC) 20-25 mg per tablet Take 1 Tablet by mouth daily. Yes Provider, Historical  
ferrous sulfate 325 mg (65 mg iron) tablet Take 325 mg by mouth three (3) times daily (with meals). Yes Provider, Historical  
tamsulosin (Flomax) 0.4 mg capsule Take 0.4 mg by mouth daily. Yes Provider, Historical  
omeprazole (PRILOSEC) 40 mg capsule Take 40 mg by mouth daily. Yes Provider, Historical  
pomalidomide 3 mg cap Take 3 mg by mouth daily. He takes 3 mg daily for 21 days with 1 week off. Indications: multiple myeloma   Yes Provider, Historical  
predniSONE (DELTASONE) 10 mg tablet Take  by mouth daily (with breakfast). taper   Yes Provider, Historical  
doxycycline (MONODOX) 100 mg capsule Take 100 mg by mouth two (2) times a day. Yes Provider, Historical  
guaiFENesin (ROBITUSSIN) 100 mg/5 mL liquid Take 200 mg by mouth three (3) times daily as needed for Cough. Provider, Historical  
 
 
Allergies/Social/Family History: Allergies Allergen Reactions  Sulfa (Sulfonamide Antibiotics) Itching Social History Tobacco Use  Smoking status: Former Smoker  Smokeless tobacco: Never Used Substance Use Topics  Alcohol use: Not Currently History reviewed. No pertinent family history. Objective:  
Vital Signs: 
Visit Vitals /69 (BP 1 Location: Left upper arm, BP Patient Position: At rest) Pulse 72 Temp 98.3 °F (36.8 °C) Resp 14 Ht 5' 11\" (1.803 m) Wt 96.3 kg (212 lb 4.8 oz) SpO2 91% BMI 29.61 kg/m² O2 Flow Rate (L/min): 60 l/min O2 Device: Hi flow nasal cannula, Heated Temp (24hrs), Av.4 °F (36.9 °C), Min:98.1 °F (36.7 °C), Max:98.7 °F (37.1 °C) Intake/Output:  
 
Intake/Output Summary (Last 24 hours) at 2021 1044 Last data filed at 6/3/2021 2330 Gross per 24 hour Intake 240 ml Output 1250 ml Net -1010 ml Physical Exam:  
General:  Alert, cooperative, on bipap, tachypneic, appears stated age. Head:  Normocephalic, without obvious abnormality, atraumatic. Eyes:  Conjunctivae/corneas clear. PERRL Neck: Supple, symmetrical, no adenopathy, no carotid bruit and no JVD. Lungs:   Diminished BS Chest wall:  No tenderness or deformity. Heart:  Regular rate and rhythm, S1-S2 normal, no murmur, no click, rub or gallop. Abdomen:   Soft, non-tender. Bowel sounds present. No masses,  No organomegaly. Extremities: Atraumatic, no cyanosis or edema. Pulses: Palpable Skin: No rashes or lesions Neurologic: Grossly nonfocal  
 
  
 LABS AND  DATA: Personally reviewed Recent Labs  
  21 
0133 WBC 30.8* HGB 14.1 HCT 44.3  Recent Labs  
  21 
8360   
K 4.3  CO2 30 BUN 32* CREA 0.89 * CA 7.9* No results for input(s): AP, TBIL, TP, ALB, GLOB, AML, LPSE in the last 72 hours. No lab exists for component: SGOT, GPT, AMYP No results for input(s): INR, PTP, APTT, INREXT, INREXT in the last 72 hours. No results for input(s): PHI, PCO2I, PO2I, FIO2I in the last 72 hours. No results for input(s): CPK, CKMB, TROIQ, BNPP in the last 72 hours. MEDS: Reviewed Chest Imaging: personally reviewed and report checked Tele- reviewed Medical decision making:  
I have reviewed the flowsheet and previous day's notes Patient has acute or chronic illness that poses a threat to life or bodily function Review and order of Clinical lab tests Review and Order of Radiology tests Independent visualization of Image Reviewed Oxygen/ NiPPV High Risk Drug therapy requiring intensive monitoring for toxicity: eg steroids, pressors, antibiotics CCT 75' Thank you for allowing me to participate in this patient's care. Jamey Hickman PA-C [facility]

## 2021-06-04 NOTE — PROGRESS NOTES
Brief Oncology Note: 
 
Patient originally consulted for recommendations for [place]. Patient continues with treatment for COVID-19 pneumonia and respiratory failure. Recommend continue to hold Pomalyst while acutely ill. Patient to follow up with primary oncologist after discharge to re-start medication. Oncology will sign off. Patient discussed with Dr. Darlene Lam. Signed By: Erica Avendano NP   
 June 4, 2021

## 2021-06-04 NOTE — PROGRESS NOTES
Day #1 of Cefepime Indication:  GN bacteremia Current regimen:  2 g Q12hr Recent Labs  
  21 
0955 21 
0133 WBC  --  30.8* CREA 0.89  --   
BUN 32*  --   
 
Est CrCl: 86 ml/min; UO:~ ml/kg/hr Temp (24hrs), Av.3 °F (36.8 °C), Min:98.1 °F (36.7 °C), Max:98.7 °F (37.1 °C) Cultures: /3 blood GNR 1/2 bottles Plan: Change to Cefepime 2 G Q8hr for crcl >60 ml/min

## 2021-06-04 NOTE — PROGRESS NOTES
[facility]  Hospitalist Group Hospitalist Progress Note Chris Fischer MD 
Answering service: [phone] from in house phone NAME:  Sheridan Jorge :  1945 MRN:  [id number] Admission Summary: This is a 30-year-old gentleman who is currently undergoing treatment for multiple myeloma was brought to the ED for rectal bleed and shortness of breath. Upon arrival of EMS at his home, patient was found to be in respiratory distress and hypoxic with SPO2 in the 70s and he was put on NRB and brought to the ED. In the ED hospital on high flow oxygen. Interval history / Subjective:  
   
Patient remains on high flow oxygen. He is alert and oriented and communicative. No overnight events. He is feeling a little better overall. Assessment & Plan:  
Acute hypoxic respiratory failure due to Covid19 pneumonia Severe sepsis (presented with hypoxic respiratory failure, lactic acidosis, leukocytosis) secondary to bilateral pneumonia 
-On high flow oxygen- cont dexamethasone, wean. s/p empiric antibiotics. Received Actemra 
-Lovenox switched to empiric full dose due to increased suspicion for VTE, awaiting CTA to r/o PE 
-Stopped remdesivir due to worsening bradycardia 
-trend CRP, ddimer 
-resume lasix per pulm -appreciate pulm 
-albuterol Leukocytosis 2/2 steroids Chronic intermittent rectal bleeding related to his cancer and chemotherapy. Rectal bleed improved with recent switch of his chemotherapy Pomalyst per his wife. 
-No rectal bleeding since admission 
-hgb stable Bradycardia. Improved after stopping remdesivir Hypokalemia: Replaced Mag ok History of multiple myeloma: On Pomalyst 3 mg daily for 21 days with 7 days off. Patient usually follows at [facility] on hold, oncology agreed Hypertension, improving, cont home norvasc Continue PTA acyclovir, folic acid, Prozac and tamsulosin Patient's Baseline: Ambulates with walking DVT ppx: Lovenox tx empiric Code status: full Disposition: TBD 
wife Rashanu John [phone], also admitted here for covid Anticipated Discharge: Greater than 48 hours Hospital Problems  Never Reviewed Codes Class Noted POA Acute hypoxemic respiratory failure ([address] ICD-10-CM: J96.01 
ICD-9-CM: 518.81  5/22/2021 Unknown Pneumonia due to COVID-19 virus ICD-10-CM: U07.1, J12.82 ICD-9-CM: 480.8, 079.89  5/22/2021 Unknown Review of Systems: A comprehensive review of systems was negative except for that written in the HPI. Vital Signs:  
 Last 24hrs VS reviewed since prior progress note. Most recent are: 
Visit Vitals /73 (BP 1 Location: Left upper arm, BP Patient Position: At rest) Pulse 89 Temp 98.6 °F (37 °C) Resp 23 Ht 5' 11\" (1.803 m) Wt 98.3 kg (216 lb 11.4 oz) SpO2 95% BMI 30.23 kg/m² Patient Vitals for the past 24 hrs: 
 Temp Pulse Resp BP SpO2  
06/03/21 2103 98.6 °F (37 °C) 89 23 122/73 95 % 06/03/21 1945     98 % 06/03/21 1836 98.7 °F (37.1 °C) 85 17 (!) 166/73 92 % 06/03/21 1558     90 % 06/03/21 1539 98.4 °F (36.9 °C) 85 17 129/72 92 % 06/03/21 1151     91 % 06/03/21 1148     98 % 06/03/21 1126 98.1 °F (36.7 °C) 64 24 134/80 95 % 06/03/21 0930  62  (!) 160/72 96 % 06/03/21 0830 98.1 °F (36.7 °C) 64 21 (!) 142/78 96 % 06/03/21 0739     96 % 06/03/21 0738     100 % 06/03/21 0645  68 20  97 % 06/03/21 0438 98.1 °F (36.7 °C) 71 21 (!) 143/87 96 % 06/03/21 0223     98 % Intake/Output Summary (Last 24 hours) at 6/4/2021 0040 Last data filed at 6/3/2021 2103 Gross per 24 hour Intake 240 ml Output 800 ml Net -560 ml Physical Examination:  
    
Constitutional:  sitting up in bed, short of breath, NAD HEENT:  Atraumatic. Oral mucosa moist,. Non icteric sclera. on oxygen via HFNC Resp: decreased breath sounds, no wheezing CV:  Regular rhythm GI:  Soft, non distended, non tender Musculoskeletal:  No edema, warm Neurologic:  no focal neurological deficits Data Review:  
 Review and/or order of clinical lab test 
Review and/or order of tests in the radiology section of CPT Review and/or order of tests in the medicine section of CPT CXR Results  (Last 48 hours) 06/03/21 0504  XR CHEST PORT Final result Impression:  Unchanged bilateral interstitial and alveolar opacities. Narrative:  INDICATION: Dyspnea COMPARISON: 5/28/2021 FINDINGS: AP portable imaging of the chest performed at 3:47 AM demonstrates a  
stable cardiomediastinal silhouette. Diffuse bilateral interstitial and alveolar  
opacities are not significantly changed. No significant osseous abnormalities  
are seen. Labs:  
 
Recent Labs  
  06/02/21 
0133 WBC 30.8* HGB 14.1 HCT 44.3  Recent Labs  
  06/03/21 
0955 06/01/21 
0520  136  
K 4.3 4.5  
 103 CO2 30 30 BUN 32* 31* CREA 0.89 0.76 * 171* CA 7.9* 7.9* No results for input(s): ALT, AP, TBIL, TBILI, TP, ALB, GLOB, GGT, AML, LPSE in the last 72 hours. No lab exists for component: SGOT, GPT, AMYP, HLPSE No results for input(s): INR, PTP, APTT, INREXT, INREXT in the last 72 hours. No results for input(s): FE, TIBC, PSAT, FERR in the last 72 hours. No results found for: FOL, RBCF No results for input(s): PH, PCO2, PO2 in the last 72 hours. No results for input(s): CPK, CKNDX, TROIQ in the last 72 hours. No lab exists for component: CPKMB No results found for: CHOL, CHOLX, CHLST, CHOLV, HDL, HDLP, LDL, LDLC, DLDLP, TGLX, TRIGL, TRIGP, CHHD, CHHDX Lab Results Component Value Date/Time  Glucose (POC) 253 (H) 06/03/2021 09:40 PM  
 Glucose (POC) 231 (H) 06/03/2021 04:45 PM  
 Glucose (POC) 183 (H) 06/03/2021 11:23 AM  
 Glucose (POC) 151 (H) 06/03/2021 08:28 AM  
 Glucose (POC) 265 (H) 06/02/2021 11:01 PM  
 
No results found for: COLOR, APPRN, SPGRU, REFSG, BRYANNA, PROTU, GLUCU, KETU, BILU, UROU, DANNY, LEUKU, GLUKE, EPSU, BACTU, WBCU, RBCU, CASTS, UCRY Medications Reviewed:  
 
Current Facility-Administered Medications Medication Dose Route Frequency  methylPREDNISolone (PF) (SOLU-MEDROL) injection 40 mg  40 mg IntraVENous Q12H  
 LORazepam (ATIVAN) injection 1 mg  1 mg IntraVENous Q4H PRN  
 furosemide (LASIX) injection 20 mg  20 mg IntraVENous BID  insulin lispro (HUMALOG) injection   SubCUTAneous AC&HS  
 glucose chewable tablet 16 g  4 Tablet Oral PRN  
 dextrose (D50W) injection syrg 12.5-25 g  12.5-25 g IntraVENous PRN  
 glucagon (GLUCAGEN) injection 1 mg  1 mg IntraMUSCular PRN  
 insulin glargine (LANTUS) injection 5 Units  5 Units SubCUTAneous DAILY  amLODIPine (NORVASC) tablet 5 mg  5 mg Oral DAILY  guaiFENesin (ROBITUSSIN) 100 mg/5 mL oral liquid 100 mg  100 mg Oral Q4H PRN  
 albuterol (PROVENTIL HFA, VENTOLIN HFA, PROAIR HFA) inhaler 2 Puff  2 Puff Inhalation TID RT  
 enoxaparin (LOVENOX) injection 100 mg  100 mg SubCUTAneous Q12H  
 fluticasone propionate (FLONASE) 50 mcg/actuation nasal spray 2 Spray  2 Spray Both Nostrils QHS  sodium chloride (OCEAN) 0.65 % nasal squeeze bottle 2 Spray  2 Spray Both Nostrils Q2H PRN  
 sodium chloride (NS) flush 5-40 mL  5-40 mL IntraVENous Q8H  
 sodium chloride (NS) flush 5-40 mL  5-40 mL IntraVENous PRN  
 sodium chloride (NS) flush 5-40 mL  5-40 mL IntraVENous Q8H  
 sodium chloride (NS) flush 5-40 mL  5-40 mL IntraVENous PRN  
 acetaminophen (TYLENOL) tablet 650 mg  650 mg Oral Q6H PRN Or  
 acetaminophen (TYLENOL) suppository 650 mg  650 mg Rectal Q6H PRN  polyethylene glycol (MIRALAX) packet 17 g  17 g Oral DAILY PRN  promethazine (PHENERGAN) tablet 12.5 mg  12.5 mg Oral Q6H PRN  Or  
 ondansetron (ZOFRAN) injection 4 mg  4 mg IntraVENous Q6H PRN  acyclovir (ZOVIRAX) tablet 400 mg  400 mg Oral BID  ferrous sulfate tablet 325 mg  1 Tablet Oral TID WITH MEALS  
 FLUoxetine (PROzac) capsule 40 mg  40 mg Oral DAILY  folic acid (FOLVITE) tablet 1 mg  1 mg Oral DAILY  tamsulosin (FLOMAX) capsule 0.4 mg  0.4 mg Oral DAILY  
 
______________________________________________________________________ EXPECTED LENGTH OF STAY: 4d 19h ACTUAL LENGTH OF STAY:          Ata Soto MD

## 2021-06-05 NOTE — PROGRESS NOTES
[facility] Adult  Hospitalist Group Hospitalist Progress Note Dede Whalen MD 
Answering service: [phone] from in house phone NAME:  Jose Luis Thompson :  1945 MRN:  [id number] Admission Summary: This is a 44-year-old gentleman who is currently undergoing treatment for multiple myeloma was brought to the ED for rectal bleed and shortness of breath. Upon arrival of EMS at his home, patient was found to be in respiratory distress and hypoxic with SPO2 in the 70s and he was put on NRB and brought to the ED. In the ED hospital on high flow oxygen. Interval history / Subjective:  
   
Patient remains on high flow oxygen. He is alert and oriented and communicative. No overnight events. He is feeling a little better overall. Bacteremia noted Assessment & Plan:  
Acute hypoxic respiratory failure due to Covid19 pneumonia Severe sepsis (presented with hypoxic respiratory failure, lactic acidosis, leukocytosis) secondary to bilateral pneumonia 
-On high flow oxygen- cont dexamethasone, wean. s/p empiric antibiotics. Received Actemra 
-Lovenox switched to empiric full dose due to increased suspicion for VTE, awaiting CTA to r/o PE 
-Stopped remdesivir due to worsening bradycardia 
-trend CRP, ddimer 
-resume lasix per pulm -appreciate pulm 
-albuterol Gram neg bacteremia- IV cefepime added today Chronic intermittent rectal bleeding related to his cancer and chemotherapy. Rectal bleed improved with recent switch of his chemotherapy Pomalyst per his wife. 
-No rectal bleeding since admission 
-hgb stable Bradycardia. Improved after stopping remdesivir Hypokalemia: Replaced Mag ok History of multiple myeloma: On Pomalyst 3 mg daily for 21 days with 7 days off. Patient usually follows at [facility] on hold, oncology agreed Hypertension, improving, cont home norvasc Continue PTA acyclovir, folic acid, Prozac and tamsulosin Patient's Baseline: Ambulates with walking DVT ppx: Lovenox tx empiric Code status: full Disposition: TBD 
wife Marly Kessler [phone], also admitted here for covid Anticipated Discharge: Greater than 48 hours Hospital Problems  Never Reviewed Codes Class Noted POA Acute hypoxemic respiratory failure ([facility] [address] ICD-10-CM: J96.01 
ICD-9-CM: 518.81  5/22/2021 Unknown Pneumonia due to COVID-19 virus ICD-10-CM: U07.1, J12.82 ICD-9-CM: 480.8, 079.89  5/22/2021 Unknown Review of Systems: A comprehensive review of systems was negative except for that written in the HPI. Vital Signs:  
 Last 24hrs VS reviewed since prior progress note. Most recent are: 
Visit Vitals /78 (BP 1 Location: Left upper arm, BP Patient Position: At rest) Pulse 72 Temp 98.5 °F (36.9 °C) Resp (!) 34 Ht 5' 11\" (1.803 m) Wt 96.3 kg (212 lb 4.8 oz) SpO2 97% BMI 29.61 kg/m² Patient Vitals for the past 24 hrs: 
 Temp Pulse Resp BP SpO2  
06/04/21 2319 98.5 °F (36.9 °C) 72 (!) 34 129/78 97 % 06/04/21 2106     96 % 06/04/21 1856 98.2 °F (36.8 °C) 90 18 122/72 94 % 06/04/21 1400 97.8 °F (36.6 °C) 87 24 135/82 99 % 06/04/21 1234 97.4 °F (36.3 °C) 84 16 120/77 98 % 06/04/21 0800     97 % 06/04/21 0729     91 % 06/04/21 0715 98.3 °F (36.8 °C) 72 14 122/69 100 % 06/04/21 0329 98.4 °F (36.9 °C) 67  125/86 100 % Intake/Output Summary (Last 24 hours) at 6/5/2021 0011 Last data filed at 6/4/2021 1200 Gross per 24 hour Intake 100 ml Output 800 ml Net -700 ml Physical Examination:  
    
Constitutional:  sitting up in bed, short of breath, NAD HEENT:  Atraumatic. Oral mucosa moist,. Non icteric sclera. on oxygen via HFNC Resp:  decreased breath sounds, no wheezing CV:  Regular rhythm GI:  Soft, non distended, non tender Musculoskeletal:  No edema, warm  Neurologic: no focal neurological deficits Data Review:  
 Review and/or order of clinical lab test 
Review and/or order of tests in the radiology section of CPT Review and/or order of tests in the medicine section of CPT CXR Results  (Last 48 hours) 06/03/21 0504  XR CHEST PORT Final result Impression:  Unchanged bilateral interstitial and alveolar opacities. Narrative:  INDICATION: Dyspnea COMPARISON: 5/28/2021 FINDINGS: AP portable imaging of the chest performed at 3:47 AM demonstrates a  
stable cardiomediastinal silhouette. Diffuse bilateral interstitial and alveolar  
opacities are not significantly changed. No significant osseous abnormalities  
are seen. Labs:  
 
Recent Labs  
  06/02/21 
0133 WBC 30.8* HGB 14.1 HCT 44.3  Recent Labs  
  06/03/21 
6945   
K 4.3  CO2 30 BUN 32* CREA 0.89 * CA 7.9* No results for input(s): ALT, AP, TBIL, TBILI, TP, ALB, GLOB, GGT, AML, LPSE in the last 72 hours. No lab exists for component: SGOT, GPT, AMYP, HLPSE No results for input(s): INR, PTP, APTT, INREXT, INREXT in the last 72 hours. No results for input(s): FE, TIBC, PSAT, FERR in the last 72 hours. No results found for: FOL, RBCF No results for input(s): PH, PCO2, PO2 in the last 72 hours. No results for input(s): CPK, CKNDX, TROIQ in the last 72 hours. No lab exists for component: CPKMB No results found for: CHOL, CHOLX, CHLST, CHOLV, HDL, HDLP, LDL, LDLC, DLDLP, TGLX, TRIGL, TRIGP, CHHD, CHHDX Lab Results Component Value Date/Time  Glucose (POC) 184 (H) 06/04/2021 09:22 PM  
 Glucose (POC) 246 (H) 06/04/2021 04:41 PM  
 Glucose (POC) 176 (H) 06/04/2021 12:31 PM  
 Glucose (POC) 147 (H) 06/04/2021 10:35 AM  
 Glucose (POC) 253 (H) 06/03/2021 09:40 PM  
 
No results found for: COLOR, APPRN, SPGRU, REFSG, BRYANNA, PROTU, GLUCU, Franco Alysha, BILU, UROU, DANNY, [address] [facility], [address] [facility], EPSU, BACTU, WBCU, [address] [facility], Rosy Tate Medications Reviewed:  
 
Current Facility-Administered Medications Medication Dose Route Frequency  cefepime (MAXIPIME) 2 g in 0.9% sodium chloride (MBP/ADV) 100 mL MBP  2 g IntraVENous Q8H  
 methylPREDNISolone (PF) (SOLU-MEDROL) injection 40 mg  40 mg IntraVENous Q12H  
 LORazepam (ATIVAN) injection 1 mg  1 mg IntraVENous Q4H PRN  
 furosemide (LASIX) injection 20 mg  20 mg IntraVENous BID  insulin lispro (HUMALOG) injection   SubCUTAneous AC&HS  
 glucose chewable tablet 16 g  4 Tablet Oral PRN  
 dextrose (D50W) injection syrg 12.5-25 g  12.5-25 g IntraVENous PRN  
 glucagon (GLUCAGEN) injection 1 mg  1 mg IntraMUSCular PRN  
 insulin glargine (LANTUS) injection 5 Units  5 Units SubCUTAneous DAILY  amLODIPine (NORVASC) tablet 5 mg  5 mg Oral DAILY  guaiFENesin (ROBITUSSIN) 100 mg/5 mL oral liquid 100 mg  100 mg Oral Q4H PRN  
 albuterol (PROVENTIL HFA, VENTOLIN HFA, PROAIR HFA) inhaler 2 Puff  2 Puff Inhalation TID RT  
 enoxaparin (LOVENOX) injection 100 mg  100 mg SubCUTAneous Q12H  
 fluticasone propionate (FLONASE) 50 mcg/actuation nasal spray 2 Spray  2 Spray Both Nostrils QHS  sodium chloride (OCEAN) 0.65 % nasal squeeze bottle 2 Spray  2 Spray Both Nostrils Q2H PRN  
 sodium chloride (NS) flush 5-40 mL  5-40 mL IntraVENous Q8H  
 sodium chloride (NS) flush 5-40 mL  5-40 mL IntraVENous PRN  
 sodium chloride (NS) flush 5-40 mL  5-40 mL IntraVENous Q8H  
 sodium chloride (NS) flush 5-40 mL  5-40 mL IntraVENous PRN  
 acetaminophen (TYLENOL) tablet 650 mg  650 mg Oral Q6H PRN Or  
 acetaminophen (TYLENOL) suppository 650 mg  650 mg Rectal Q6H PRN  polyethylene glycol (MIRALAX) packet 17 g  17 g Oral DAILY PRN  promethazine (PHENERGAN) tablet 12.5 mg  12.5 mg Oral Q6H PRN Or  
 ondansetron (ZOFRAN) injection 4 mg  4 mg IntraVENous Q6H PRN  
 acyclovir (ZOVIRAX) tablet 400 mg  400 mg Oral BID  ferrous sulfate tablet 325 mg  1 Tablet Oral TID WITH MEALS  
 FLUoxetine (PROzac) capsule 40 mg  40 mg Oral DAILY  folic acid (FOLVITE) tablet 1 mg  1 mg Oral DAILY  tamsulosin (FLOMAX) capsule 0.4 mg  0.4 mg Oral DAILY  
 
______________________________________________________________________ EXPECTED LENGTH OF STAY: 4d 19h ACTUAL LENGTH OF STAY:          Kaylin Cano MD  
 Rule out ACS You were admitted because you came to the hospital with complaint of chest pain. Your pain has since resolved. Your EKG was negative. You were admitted because you came to the hospital with complaint of chest pain. Your pain has since resolved. Your EKG was negative. Cardiac enzymes were negative. Echocardiogram and stress test were both negative. Your pain was likely of gastrointestinal origin. We recommend protonix on discharge. BP Control You have a history of hypertension. Continue taking your home medications as prescribed. Blood glucose control You have a history of diabetes. We advise you continue your home medications as prescribed.

## 2021-06-06 NOTE — PROGRESS NOTES
Bedside shift change report given to DB Beavers (oncoming nurse) by Kelli Jensen (offgoing nurse). Report included the following information SBAR, ED Summary, MAR, Recent Results and Med Rec Status.

## 2021-06-06 NOTE — PROGRESS NOTES
Bedside shift change report given to Rebeka (oncoming nurse) by Marcela Medina (offgoing nurse). Report included the following information SBAR, Kardex, ED Summary, MAR, Recent Results and Cardiac Rhythm NSR. Problem: Breathing Pattern - Ineffective Goal: *Absence of hypoxia Outcome: Progressing Towards Goal 
Note: Pt is currently on bipap. Attempts to wean have been made, pt is refusing. Pt is sating 100% on bipap. Problem: Gas Exchange - Impaired Goal: Absence of hypoxia Outcome: Progressing Towards Goal 
Note: Pt is currently on bipap sating 100% Problem: Falls - Risk of 
Goal: *Absence of Falls Description: Document Sada Herediasen Fall Risk and appropriate interventions in the flowsheet. Outcome: Progressing Towards Goal 
Note: Fall Risk Interventions: 
Mobility Interventions: Communicate number of staff needed for ambulation/transfer, Patient to call before getting OOB, Utilize walker, cane, or other assistive device Medication Interventions: Teach patient to arise slowly, Patient to call before getting OOB, Evaluate medications/consider consulting pharmacy Elimination Interventions: Toileting schedule/hourly rounds, Patient to call for help with toileting needs, Call light in reach Problem: Pressure Injury - Risk of 
Goal: *Prevention of pressure injury Description: Document Ari Scale and appropriate interventions in the flowsheet. Outcome: Progressing Towards Goal 
Note: Pressure Injury Interventions: 
Sensory Interventions: Assess changes in LOC, Check visual cues for pain, Float heels, Keep linens dry and wrinkle-free, Minimize linen layers, Pressure redistribution bed/mattress (bed type) Moisture Interventions: Moisture barrier, Minimize layers, Maintain skin hydration (lotion/cream), Internal/External urinary devices, Check for incontinence Q2 hours and as needed, Apply protective barrier, creams and emollients, Absorbent underpads Activity Interventions: PT/OT evaluation, Pressure redistribution bed/mattress(bed type), Increase time out of bed Mobility Interventions: Turn and reposition approx. every two hours(pillow and wedges), Pressure redistribution bed/mattress (bed type), Assess need for specialty bed Nutrition Interventions: Document food/fluid/supplement intake, Offer support with meals,snacks and hydration Friction and Shear Interventions: Transferring/repositioning devices, Apply protective barrier, creams and emollients, Minimize layers

## 2021-06-06 NOTE — PROGRESS NOTES
2143: This nurse entered the room to check on patient. The patient stated,\"Are you here to take this line out? \" This nurse stated that Dr. Kenna Davila stated to leave the line in. The patient stated,\"I will pull this out, if its not taken out. \" This nurse stated she will ask about pain medication for the swollen right arm. 2135: Attempted to take pt off bipap and put on high flow. Pt refused. Pt was stating > 95%.

## 2021-06-06 NOTE — PROGRESS NOTES
[address] [facility] Adult  Hospitalist Group Hospitalist Progress Note Rosalia Tate MD 
Answering service: [phone] OR 36 from in house phone NAME:  Nikki Jarquin :  1945 MRN:  [id number] Admission Summary: This is a 53-year-old gentleman who is currently undergoing treatment for multiple myeloma was brought to the ED for rectal bleed and shortness of breath. Upon arrival of EMS at his home, patient was found to be in respiratory distress and hypoxic with SPO2 in the 70s and he was put on NRB and brought to the ED. In the ED hospital on high flow oxygen. Interval history / Subjective:  
   
Patient remains on high flow oxygen and/or BiPAP. He is alert and oriented and communicative. No overnight events. Right arm swelling below PICC- doppler ordered On IV abx for Bacteremia Assessment & Plan:  
Acute hypoxic respiratory failure due to Covid19 pneumonia Severe sepsis (presented with hypoxic respiratory failure, lactic acidosis, leukocytosis) secondary to bilateral pneumonia 
-On high flow oxygen- cont dexamethasone,  Received Actemra 
-Lovenox switched to empiric full dose due to increased suspicion for VTE, awaiting CTA to r/o PE 
-Stopped remdesivir due to worsening bradycardia 
-trend CRP, ddimer 
-resume lasix - dose increased 21 Gram neg bacteremia- cont IV cefepime via PICC Chronic intermittent rectal bleeding related to his cancer and chemotherapy. Rectal bleed resolved with recent switch of his chemotherapy Pomalyst per his wife. 
-No rectal bleeding since admission 
-hgb stable Bradycardia. Improved after stopping remdesivir Hypokalemia: Replaced Mag ok History of multiple myeloma: On Pomalyst 3 mg daily for 21 days with 7 days off. Patient usually follows at [facility] on hold, oncology agreed Hypertension, improving, cont home norvasc Continue PTA acyclovir, folic acid, Prozac and tamsulosin Poor peripheral access- PICC line in right arm-  
Right arm swelling- Doppler ordered to rule out thrombosis Patient's Baseline: Ambulates with walking DVT ppx: Lovenox tx empiric Code status: full Disposition: TBD 
wife Librado Marrufo [phone], also admitted here for covid Anticipated Discharge: Greater than 48 hours Hospital Problems  Never Reviewed Codes Class Noted POA Acute hypoxemic respiratory failure ([facility] [address] ICD-10-CM: J96.01 
ICD-9-CM: 518.81  5/22/2021 Unknown Pneumonia due to COVID-19 virus ICD-10-CM: U07.1, J12.82 ICD-9-CM: 480.8, 079.89  5/22/2021 Unknown Review of Systems: A comprehensive review of systems was negative except for that written in the HPI. Vital Signs:  
 Last 24hrs VS reviewed since prior progress note. Most recent are: 
Visit Vitals /80 (BP 1 Location: Left arm, BP Patient Position: At rest) Pulse 95 Temp 97.6 °F (36.4 °C) Resp 30 Ht 5' 11\" (1.803 m) Wt 96.3 kg (212 lb 4.8 oz) SpO2 95% BMI 29.61 kg/m² Patient Vitals for the past 24 hrs: 
 Temp Pulse Resp BP SpO2  
06/05/21 1600 97.6 °F (36.4 °C) 95 30 117/80 95 % 06/05/21 1252 97.4 °F (36.3 °C) 91 29 127/89 98 % 06/05/21 0853     98 % 06/05/21 0802 97.4 °F (36.3 °C) 64 18 133/86 96 % 06/05/21 0331 98 °F (36.7 °C) 77 30 134/78 95 % 06/05/21 0122     94 % 06/04/21 2319 98.5 °F (36.9 °C) 72 (!) 34 129/78 97 % Intake/Output Summary (Last 24 hours) at 6/5/2021 2247 Last data filed at 6/5/2021 1856 Gross per 24 hour Intake  Output 1200 ml Net -1200 ml Physical Examination:  
    
Constitutional:  sitting up in bed, short of breath, NAD HEENT:  on BiPAP Resp:  decreased breath sounds, no wheezing CV:  Regular rhythm GI:  Soft, non distended, non tender Musculoskeletal:  edema in all 4 ext, greater in R arm edema, warm  Neurologic:  no focal neurological deficits Data Review:  
 Review and/or order of clinical lab test 
Review and/or order of tests in the radiology section of CPT Review and/or order of tests in the medicine section of CPT CXR Results  (Last 48 hours) None Labs:  
 
No results for input(s): WBC, HGB, HCT, PLT, HGBEXT, HCTEXT, PLTEXT, HGBEXT, HCTEXT, PLTEXT in the last 72 hours. Recent Labs  
  06/03/21 
9458   
K 4.3  CO2 30 BUN 32* CREA 0.89 * CA 7.9* No results for input(s): ALT, AP, TBIL, TBILI, TP, ALB, GLOB, GGT, AML, LPSE in the last 72 hours. No lab exists for component: SGOT, GPT, AMYP, HLPSE No results for input(s): INR, PTP, APTT, INREXT, INREXT in the last 72 hours. No results for input(s): FE, TIBC, PSAT, FERR in the last 72 hours. No results found for: FOL, RBCF No results for input(s): PH, PCO2, PO2 in the last 72 hours. No results for input(s): CPK, CKNDX, TROIQ in the last 72 hours. No lab exists for component: CPKMB No results found for: CHOL, CHOLX, CHLST, CHOLV, HDL, HDLP, LDL, LDLC, DLDLP, TGLX, TRIGL, TRIGP, CHHD, CHHDX Lab Results Component Value Date/Time Glucose (POC) 234 (H) 06/05/2021 09:52 PM  
 Glucose (POC) 250 (H) 06/05/2021 05:12 PM  
 Glucose (POC) 165 (H) 06/05/2021 12:32 PM  
 Glucose (POC) 161 (H) 06/05/2021 08:05 AM  
 Glucose (POC) 202 (H) 06/05/2021 07:25 AM  
 
No results found for: COLOR, APPRN, SPGRU, REFSG, BRYANNA, PROTU, GLUCU, Ellouise Rued, BILU, UROU, DANNY, LEUKU, GLUKE, EPSU, BACTU, WBCU, RBCU, CASTS, UCRY Medications Reviewed:  
 
Current Facility-Administered Medications Medication Dose Route Frequency  furosemide (LASIX) injection 40 mg  40 mg IntraVENous BID  cefepime (MAXIPIME) 2 g in 0.9% sodium chloride (MBP/ADV) 100 mL MBP  2 g IntraVENous Q8H  
 methylPREDNISolone (PF) (SOLU-MEDROL) injection 40 mg  40 mg IntraVENous Q12H  
 LORazepam (ATIVAN) injection 1 mg  1 mg IntraVENous Q4H PRN  
 insulin lispro (HUMALOG) injection   SubCUTAneous AC&HS  
 glucose chewable tablet 16 g  4 Tablet Oral PRN  
 dextrose (D50W) injection syrg 12.5-25 g  12.5-25 g IntraVENous PRN  
 glucagon (GLUCAGEN) injection 1 mg  1 mg IntraMUSCular PRN  
 insulin glargine (LANTUS) injection 5 Units  5 Units SubCUTAneous DAILY  amLODIPine (NORVASC) tablet 5 mg  5 mg Oral DAILY  guaiFENesin (ROBITUSSIN) 100 mg/5 mL oral liquid 100 mg  100 mg Oral Q4H PRN  
 albuterol (PROVENTIL HFA, VENTOLIN HFA, PROAIR HFA) inhaler 2 Puff  2 Puff Inhalation TID RT  
 enoxaparin (LOVENOX) injection 100 mg  100 mg SubCUTAneous Q12H  
 fluticasone propionate (FLONASE) 50 mcg/actuation nasal spray 2 Spray  2 Spray Both Nostrils QHS  sodium chloride (OCEAN) 0.65 % nasal squeeze bottle 2 Spray  2 Spray Both Nostrils Q2H PRN  
 sodium chloride (NS) flush 5-40 mL  5-40 mL IntraVENous Q8H  
 sodium chloride (NS) flush 5-40 mL  5-40 mL IntraVENous PRN  
 sodium chloride (NS) flush 5-40 mL  5-40 mL IntraVENous Q8H  
 sodium chloride (NS) flush 5-40 mL  5-40 mL IntraVENous PRN  
 acetaminophen (TYLENOL) tablet 650 mg  650 mg Oral Q6H PRN Or  
 acetaminophen (TYLENOL) suppository 650 mg  650 mg Rectal Q6H PRN  polyethylene glycol (MIRALAX) packet 17 g  17 g Oral DAILY PRN  promethazine (PHENERGAN) tablet 12.5 mg  12.5 mg Oral Q6H PRN Or  
 ondansetron (ZOFRAN) injection 4 mg  4 mg IntraVENous Q6H PRN  
 acyclovir (ZOVIRAX) tablet 400 mg  400 mg Oral BID  ferrous sulfate tablet 325 mg  1 Tablet Oral TID WITH MEALS  
 FLUoxetine (PROzac) capsule 40 mg  40 mg Oral DAILY  folic acid (FOLVITE) tablet 1 mg  1 mg Oral DAILY  tamsulosin (FLOMAX) capsule 0.4 mg  0.4 mg Oral DAILY  
 
______________________________________________________________________ EXPECTED LENGTH OF STAY: 4d 19h ACTUAL LENGTH OF STAY:          Kaylin Cano MD

## 2021-06-06 NOTE — PROGRESS NOTES
Bedside shift change report given to Nabeel Stone (oncoming nurse) by Norma Jordan (offgoing nurse). Report included the following information SBAR, Kardex, ED Summary, [facility] and Recent Results.

## 2021-06-06 NOTE — PROGRESS NOTES
1900-Patient called out and was screaming into the call bell. This RN entered the room and patient was thrashing around in bed, screaming, pulling off his BIPAP, pulling at lines, and reaching for the ceiling. Patient unable to verbalize what was wrong. 1905-Ativan administered, this RN and another RN at bedside attempting to calm patient, O2 sats in the 90's, patient continuing to thrash in bed.    1924-Patient resting in bed, still quite anxious and restless, assured him that he was doing fine and that his O2 sats were at 100%. Will continue to monitor. Bedside shift change report given to Jemima Dunne RN (oncoming nurse) by DB Hewitt (offgoing nurse).  Report included the following information SBAR, Kardex, Intake/Output, MAR, Accordion and Cardiac Rhythm NSR-ST.

## 2021-06-07 NOTE — PROGRESS NOTES
06/07/21 6715 Vent Settings FIO2 (%) 100 % CMV Rate Set 34 
(increased after ABG d/t persistent acidosis) Back-Up Rate 34 PC Set 25 PEEP/VENT (cm H2O) 10 cm H20  
I:E Ratio 1:1 Insp Time (sec) 0.88 sec Insp Rise Time % 50 % Pressure Trigger 3 Ventilator Measurements Resp Rate Observed 34 Vt Exhaled (Machine Breath) (ml) 433 ml Ve Observed (l/min) 14.7 l/min PIP Observed (cm H2O) 36 cm H2O Plateau Pressure (cm H2O) 32 cm H2O Driving Pressure 22 [id number]  
MAP (cm H2O) 22 I:E Ratio Actual 1:1 Auto PEEP Observed (cm H2O) 3.8 cm H2O Static Compliance (ml/cm H20) 21 ml/cm H20 Vent Method/Mode Ventilation Method Conventional  
Ventilator Mode Assist control;Pressure control RT at bedside continuously since arrival to ICU. RT obtained ABGs to track patients persistent acidosis. RT assisted in CPR via ventilation x 3 arrests. RT managed vent & adjusted as needed. RT also S/U & assisted Dr Larry Calloway with a bedside bronchoscopy then a bronchoscopy/alveolar blockage procedure. During the 2nd cardiac arrest, severe pulmonary hemorrhage began. Blood is bright red & arterial in nature. Dr Larry Calloway agrees. After the 2nd arrest, the bronchial blockage was placed in the right lung. During the 3rd arrest/CPR, ETT with block in place was carefully secured to prevent dislodgement. But again pulmonary hemorrhage happened. Both lungs flooded with blood. RT/ Vent was no longer able to ventilate patient. Dr Larry Calloway at head of bed with RT.

## 2021-06-07 NOTE — PROGRESS NOTES
[address] [facility] Adult  Hospitalist Group                                                                                          Hospitalist Progress Note  Martha Marr MD  Answering service: [phone] OR [phone] from in house phone     NAME:  Marquis Posada  :  1945  MRN:  [id number]       Admission Summary: This is a 30-year-old gentleman who is currently undergoing treatment for multiple myeloma was brought to the ED for rectal bleed and shortness of breath. Upon arrival of EMS at his home, patient was found to be in respiratory distress and hypoxic with SPO2 in the 70s and he was put on NRB and brought to the ED. In the ED hospital on high flow oxygen. Interval history / Subjective:       Patient remains on high flow oxygen and/or BiPAP. He is alert and oriented and communicative. No overnight events. Severe Right arm swelling below PICC- doppler neg for DVT  Stat CT of right arm    Assessment & Plan:   Acute hypoxic respiratory failure due to Covid19 pneumonia  Severe sepsis (presented with hypoxic respiratory failure, lactic acidosis, leukocytosis) secondary to bilateral pneumonia  -On high flow oxygen- cont dexamethasone,  Received Actemra  -Lovenox switched to empiric full dose due to increased suspicion for VTE, awaiting CTA to r/o PE  -Stopped remdesivir due to worsening bradycardia  -trend CRP, ddimer  -resume lasix - dose increased 21    Salmonella bacteremia- stopped IV cefepime and started IV levaquin     Chronic intermittent rectal bleeding related to his cancer and chemotherapy. Rectal bleed resolved with recent switch of his chemotherapy Pomalyst per his wife.  -No rectal bleeding since admission  -hgb stable    Bradycardia. Improved after stopping remdesivir     Hypokalemia: Replaced  Mag ok     History of multiple myeloma: On Pomalyst 3 mg daily for 21 days with 7 days off.     Patient usually follows at [facility] on hold, oncology agreed    Hypertension, improving, cont home norvasc  Continue PTA acyclovir, folic acid, Prozac and tamsulosin    Poor peripheral access- PICC line in right arm-   Severe Right arm swelling- Doppler neg for  Thrombosis  CT of arm ordered      Patient's Baseline: Ambulates with walking  DVT ppx: Lovenox tx empiric  Code status: full  Disposition: TBD  wife Kaitlyn Schmid [phone], also admitted here for covid    Anticipated Discharge: Greater than 48 hours  Hospital Problems  Never Reviewed        Codes Class Noted POA    Acute hypoxemic respiratory failure ([facility] [address] ICD-10-CM: J96.01  ICD-9-CM: 518.81  5/22/2021 Unknown        Pneumonia due to COVID-19 virus ICD-10-CM: U07.1, J12.82  ICD-9-CM: 480.8, 079.89  5/22/2021 Unknown            Review of Systems:   A comprehensive review of systems was negative except for that written in the HPI. Vital Signs:    Last 24hrs VS reviewed since prior progress note.  Most recent are:  Visit Vitals  /80 (BP 1 Location: Left arm, BP Patient Position: At rest)   Pulse (!) 112   Temp 97.3 °F (36.3 °C)   Resp 25   Ht 5' 11\" (1.803 m)   Wt 97.6 kg (215 lb 2.7 oz)   SpO2 100%   BMI 30.01 kg/m²     Patient Vitals for the past 24 hrs:   Temp Pulse Resp BP SpO2   06/06/21 1942     100 %   06/06/21 1925 97.3 °F (36.3 °C) (!) 112 25 100/80 100 %   06/06/21 1601 97.2 °F (36.2 °C) (!) 110 30 107/87 99 %   06/06/21 1543     100 %   06/06/21 1453     100 %   06/06/21 1446     91 %   06/06/21 1217 97 °F (36.1 °C) (!) 106  105/83 100 %   06/06/21 1135     95 %   06/06/21 0752     100 %   06/06/21 0751     100 %   06/06/21 0644 97 °F (36.1 °C) 89 20 (!) 139/96 94 %   06/06/21 0524  81 22 (!) 139/102 100 %   06/06/21 0300  84   100 %   06/05/21 2300 97 °F (36.1 °C) 83  (!) 146/94 99 %       Intake/Output Summary (Last 24 hours) at 6/6/2021 2256  Last data filed at 6/6/2021 1834  Gross per 24 hour   Intake    Output 975 ml   Net -975 ml        Physical Examination: Constitutional:  sitting up in bed, short of breath, NAD     HEENT:  on BiPAP   Resp:  decreased breath sounds, no wheezing   CV:  Regular rhythm    GI:  Soft, non distended, non tender    Musculoskeletal:  mild edema in all ext, except severe bruising and massive swelling of R arm     Neurologic:  no focal neurological deficits            Data Review:    Review and/or order of clinical lab test  Review and/or order of tests in the radiology section of CPT  Review and/or order of tests in the medicine section of CPT    CXR Results  (Last 48 hours)    None          Labs:     Recent Labs     06/06/21 0052   WBC 39.2*   HGB 12.9   HCT 39.8        Recent Labs     06/06/21 0052      K 4.6      CO2 29   BUN 54*   CREA 1.18   *   CA 7.7*   MG 2.2     Recent Labs     06/06/21 0052   *   AP 73   TBILI 0.6   TP 5.4*   ALB 2.6*   GLOB 2.8     No results for input(s): INR, PTP, APTT, INREXT, INREXT in the last 72 hours. No results for input(s): FE, TIBC, PSAT, FERR in the last 72 hours. No results found for: FOL, RBCF   No results for input(s): PH, PCO2, PO2 in the last 72 hours. No results for input(s): CPK, CKNDX, TROIQ in the last 72 hours.     No lab exists for component: CPKMB  No results found for: CHOL, CHOLX, CHLST, CHOLV, HDL, HDLP, LDL, LDLC, DLDLP, TGLX, TRIGL, TRIGP, CHHD, CHHDX  Lab Results   Component Value Date/Time    Glucose (POC) 304 (H) 06/06/2021 04:28 PM    Glucose (POC) 280 (H) 06/06/2021 12:26 PM    Glucose (POC) 229 (H) 06/06/2021 08:38 AM    Glucose (POC) 234 (H) 06/05/2021 09:52 PM    Glucose (POC) 250 (H) 06/05/2021 05:12 PM     No results found for: COLOR, APPRN, SPGRU, REFSG, BRYANNA, PROTU, GLUCU, KETU, BILU, UROU, DANNY, LEUKU, GLUKE, EPSU, BACTU, WBCU, RBCU, CASTS, UCRY      Medications Reviewed:     Current Facility-Administered Medications   Medication Dose Route Frequency    insulin glargine (LANTUS) injection 10 Units  10 Units SubCUTAneous QHS    insulin lispro (HUMALOG) injection 5 Units  5 Units SubCUTAneous TIDAC    levoFLOXacin (LEVAQUIN) 750 mg in D5W IVPB  750 mg IntraVENous Q24H    furosemide (LASIX) injection 40 mg  40 mg IntraVENous BID    methylPREDNISolone (PF) (SOLU-MEDROL) injection 40 mg  40 mg IntraVENous Q12H    LORazepam (ATIVAN) injection 1 mg  1 mg IntraVENous Q4H PRN    insulin lispro (HUMALOG) injection   SubCUTAneous AC&HS    glucose chewable tablet 16 g  4 Tablet Oral PRN    dextrose (D50W) injection syrg 12.5-25 g  12.5-25 g IntraVENous PRN    glucagon (GLUCAGEN) injection 1 mg  1 mg IntraMUSCular PRN    amLODIPine (NORVASC) tablet 5 mg  5 mg Oral DAILY    guaiFENesin (ROBITUSSIN) 100 mg/5 mL oral liquid 100 mg  100 mg Oral Q4H PRN    albuterol (PROVENTIL HFA, VENTOLIN HFA, PROAIR HFA) inhaler 2 Puff  2 Puff Inhalation TID RT    [Held by provider] enoxaparin (LOVENOX) injection 100 mg  100 mg SubCUTAneous Q12H    fluticasone propionate (FLONASE) 50 mcg/actuation nasal spray 2 Spray  2 Spray Both Nostrils QHS    sodium chloride (OCEAN) 0.65 % nasal squeeze bottle 2 Spray  2 Spray Both Nostrils Q2H PRN    sodium chloride (NS) flush 5-40 mL  5-40 mL IntraVENous Q8H    sodium chloride (NS) flush 5-40 mL  5-40 mL IntraVENous PRN    sodium chloride (NS) flush 5-40 mL  5-40 mL IntraVENous Q8H    sodium chloride (NS) flush 5-40 mL  5-40 mL IntraVENous PRN    acetaminophen (TYLENOL) tablet 650 mg  650 mg Oral Q6H PRN    Or    acetaminophen (TYLENOL) suppository 650 mg  650 mg Rectal Q6H PRN    polyethylene glycol (MIRALAX) packet 17 g  17 g Oral DAILY PRN    promethazine (PHENERGAN) tablet 12.5 mg  12.5 mg Oral Q6H PRN    Or    ondansetron (ZOFRAN) injection 4 mg  4 mg IntraVENous Q6H PRN    acyclovir (ZOVIRAX) tablet 400 mg  400 mg Oral BID    ferrous sulfate tablet 325 mg  1 Tablet Oral TID WITH MEALS    FLUoxetine (PROzac) capsule 40 mg  40 mg Oral DAILY    folic acid (FOLVITE) tablet 1 mg  1 mg Oral DAILY    tamsulosin (FLOMAX) capsule 0.4 mg  0.4 mg Oral DAILY     ______________________________________________________________________  EXPECTED LENGTH OF STAY: 4d 19h  ACTUAL LENGTH OF STAY:          15                 Devon Santillan MD

## 2021-06-07 NOTE — PROGRESS NOTES
Intubation Procedure Note    Called to bedside secondary to code blue. The patient was pre-oxygenated with 100% oxygen. Ludwig x-blade x 1    7.5 ETT taped and secured at 22 cm at the teeth.    + Bilateral BS, + Chest rise, + ETCO2    VSS. CXR pending.

## 2021-06-07 NOTE — CONSULTS
[facility]    ICU TEAM Consult Note    Name: Jose Luis Thompson   : 1945   MRN: [id number]   Date: 2021      Assessment:     ICU Problems:  PEA cardiac arrest   Acute hypoxic respiratory failure due to Covid19 pneumonia  Severe sepsis (presented with hypoxic respiratory failure, lactic acidosis, leukocytosis) secondary to bilateral pneumonia   Salmonella bacteremia  Hypovolemic shock -  acute hbg drop pulmonary bleed vs active GI bleed, Coffee ground emesis present. Right lung pulmonary hemorrhage  Severe Metabolic Acidosis   Chronic intermittent rectal bleeding related to his cancer and chemotherapy.    Severe Leukocytosis  ZHANE  Hypocalcemia  Shock liver  Lactic acidosis post arres  History of multiple myeloma  Hx Hypertension    ICU Comprehensive Plan of Care:     Plans for this Shift:     1. Admit to ICU   2. Stat Chxr and KUB post CVL and OGT placement  3. Give 2 amps Bicarb and start on Bicarb gtt. 4. Massive transfusion protocol   1:1:1 - ordered  5. Give a unit of uncross matched PRBC's, hbg from from 13 to 7 with hemodynamic instability. 6. Calcium gluconate 2 mg  7. Continue pressors as below for MAP goal 65 mmHg  8. COVID Treatment:  a. Immunomodulator--Steroids -- Yes Solumedrol  b. Antiviral--Remdesivir -- Other , partial dose, stopped due to reaction  c. COVID-19 Specific Anticoagulation -- No No  d. Antibiotics -- Levofloxacin  e. Acyclovir    9. SBP Goal of: > 90 mmHg  10. MAP Goal of: > 65 mmHg  11. Phenylephrine, Norepinephrine and Vasopressin - For above SBP/MAP goals  12. IVFs: LR boluses  13. Transfusion Trigger (Hgb): <7 g/dL  14. Respiratory Goals:  a. Chlorhexidine   b. Optimize PEEP/Ventilation/Oxygenation  c. Goal Tidal Volume 6 cc/kg based on IBW  d. Aim for lung protective ventilation  e. Head of bed > 30 degrees  15. Pulmonary toilet: Albuterol   16. SpO2 Goal: > 92% per vent  17. Keep K>4; Mg>2   18. PT/OT: NA   19.  Discussed Plan of Care/Code Status: Full Code  20. Appreciate Consultants Input : Heme onc/ Pulmonary  21. Discussed Care Plan with Bedside RN  22. Documentation of Current Medications  23. Rest of Plan Below:    F - Feeding:  No NPO  A - Analgesia: None  S - Sedation: None  T - DVT Prophylaxis: SCD's or Sequential Compression Device   H - Head of Bed: > 30 Degrees  U - Ulcer Prophylaxis: Protonix (pantoprazole)   G - Glycemic Control: SSI  S - Spontaneous Breathing Trial: N/A  B - Bowel Regimen: None needed at this time  I - Indwelling Catheter:   Tubes: ETT and Orogastric Tube  Lines: Peripheral IV, Arterial Line and Central Line  Drains: Wetzel Catheter  D - De-escalation of Antibiotics: Levofloxacin    Subjective:   Progress Note: 6/7/2021      Reason for ICU Admission:Cardiac arrest    HPI:  Donn Smith is a 78 y.o. gentleman who was undergoing treatment for multiple myeloma was brought to the ED 5/22 for rectal bleed and shortness of breath. Upon arrival of EMS at his home, patient was found to be in respiratory distress and hypoxic with SPO2 in the 70s and he was put on NRB and brought to the ED. Admitted for rectal bleed and Covid pneumonia. On IMCU, Pulmonary and Hem/onc consulted and following. Worseing hypoxia. Earlier in shift patient restless and agitated. Right arm PICC was not working and arm was swollen tight and discolored. Patient was given 1 mg of Ativan for CT but was not able to lay flat for scan. RRT was called in CT. Another 1 mg of Ativan was given to calm patient and patient was brought back up to [facility]. Hospitalist came to re -evaluate patient and when they went back into the room patient was unresponsive in PEA arrest. Code blue was called. After ROSC (23 mins), patient was started on epinephrine gtt and levophed gtt. Arterial line was placed at bedside, CVL placement attempted at L IJ but unable to place. Patient was transferred to ICU. Started on vasopressin and bicarb gtt. CVL was successfully placed emergently in the R IJ. Chest x-ray confirmed placement of CVL , ETT and OGT. Family updated outside ICU in waiting room. Patient is critical condition. Family request patient remain full code at this time. Patient re-coded in ICU. ROSC achieved (4mins). Intensivist at bedside POC TTE. Concern for Hypovolemia. Acute Hgb drop. Massive transfusion protocol initiated. 1:1:1 and Blood bank notified. Patient coded again, resuscitated (ROSC achieved in 3 mins). Overnight Events:   6/7/2021    POD:  * No surgery found *    S/P:       Active Problem List:     Problem List  Never Reviewed        Codes Class    Acute hypoxemic respiratory failure (HCC) ICD-10-CM: J96.01  ICD-9-CM: 518.81         Pneumonia due to COVID-19 virus ICD-10-CM: U07.1, J12.82  ICD-9-CM: 480.8, 079.89               Past Medical History:      has a past medical history of Hypertension and Multiple myeloma ([facility] [address] Past Surgical History:      has a past surgical history that includes hx cholecystectomy. Home Medications:     Prior to Admission medications    Medication Sig Start Date End Date Taking? Authorizing Provider   FLUoxetine (PROzac) 40 mg capsule Take 40 mg by mouth daily. Yes Provider, Historical   acyclovir (ZOVIRAX) 400 mg tablet Take 400 mg by mouth two (2) times a day. Yes Provider, Historical   folic acid (FOLVITE) 1 mg tablet Take 1 mg by mouth daily. Yes Provider, Historical   amLODIPine (NORVASC) 10 mg tablet Take 10 mg by mouth daily. Yes Provider, Historical   lisinopril-hydroCHLOROthiazide (PRINZIDE, ZESTORETIC) 20-25 mg per tablet Take 1 Tablet by mouth daily. Yes Provider, Historical   ferrous sulfate 325 mg (65 mg iron) tablet Take 325 mg by mouth three (3) times daily (with meals). Yes Provider, Historical   tamsulosin (Flomax) 0.4 mg capsule Take 0.4 mg by mouth daily. Yes Provider, Historical   omeprazole (PRILOSEC) 40 mg capsule Take 40 mg by mouth daily.    Yes Provider, Historical   pomalidomide 3 mg cap Take 3 mg by mouth daily. He takes 3 mg daily for 21 days with 1 week off. Indications: multiple myeloma   Yes Provider, Historical   predniSONE (DELTASONE) 10 mg tablet Take  by mouth daily (with breakfast). taper   Yes Provider, Historical   doxycycline (MONODOX) 100 mg capsule Take 100 mg by mouth two (2) times a day. Yes Provider, Historical   guaiFENesin (ROBITUSSIN) 100 mg/5 mL liquid Take 200 mg by mouth three (3) times daily as needed for Cough. Provider, Historical       Allergies/Social/Family History: Allergies   Allergen Reactions    Sulfa (Sulfonamide Antibiotics) Itching      Social History     Tobacco Use    Smoking status: Former Smoker    Smokeless tobacco: Never Used   Substance Use Topics    Alcohol use: Not Currently      History reviewed. No pertinent family history. Review of Systems:     Review of systems not obtained due to patient factors. Objective:   Vital Signs:  Visit Vitals  BP (!) 66/35   Pulse 65   Temp (!) 91.4 °F (33 °C)   Resp 30   Ht 5' 11\" (1.803 m)   Wt 97.6 kg (215 lb 2.7 oz)   SpO2 100%   BMI 30.01 kg/m²    O2 Flow Rate (L/min): 50 l/min O2 Device: BIPAP Temp (24hrs), Av.2 °F (35.7 °C), Min:91.4 °F (33 °C), Max:97.3 °F (36.3 °C)           Intake/Output:     Intake/Output Summary (Last 24 hours) at 2021 0309  Last data filed at 2021 1834  Gross per 24 hour   Intake    Output 975 ml   Net -975 ml       Physical Exam:    General:  appears stated age, Intubated and Sedation   Eye:  conjunctivae/corneas clear. Pupils non-reactive bilaterally  Neurologic:  Unresponsive, No withdrawal to pain. Lymphatic:  Cervical, supraclavicular, and axillary nodes normal.   Neck:  normal and no erythema or exudates noted. Lungs:  Diminished bilaterally, Blood coming out  ETT. Heart: edith, regular rhythm, s1 s2. Abdomen:  soft, non-tender. Bowel sounds normal. No masses,  no organomegaly  Cardiovascular:  S1S2 present and thready pulses.    Skin:  Normal. and no rash or abnormalities    LABS AND  DATA: Personally reviewed  Recent Labs     06/07/21 0135 06/06/21 0052   WBC 69.3* 39.2*   HGB 7.4* 12.9   HCT 25.6* 39.8    195     Recent Labs     06/07/21 0135 06/06/21 0052    136   K 4.8 4.6    102   CO2 8* 29   BUN 70* 54*   CREA 2.22* 1.18   * 204*   CA 6.8* 7.7*   MG 3.0* 2.2   PHOS 12.7*  --      Recent Labs     06/07/21 0135 06/06/21 0052   AP 61 73   TP 3.1* 5.4*   ALB 1.4* 2.6*   GLOB 1.7* 2.8     Recent Labs     06/07/21 0135   INR 1.8*   PTP 17.9*   APTT 76.3*      Recent Labs     06/07/21  0233 06/07/21  0140   PHI 7.01* 6.75*   PCO2I 63.8* 55.9*   PO2I 75* 242*   FIO2I 100  --      No results for input(s): CPK, CKMB, TROIQ, BNPP in the last 72 hours.     Hemodynamics:   PAP:   CO:     Wedge:   CI:     CVP:    SVR:       PVR:       Ventilator Settings:  Mode Rate Tidal Volume Pressure FiO2 PEEP            80 %       Peak airway pressure:      Minute ventilation: 45.8 l/min        MEDS: Reviewed    Current Facility-Administered Medications:     EPINEPHrine (ADRENALIN) 5 mg in 0.9% sodium chloride 250 mL infusion, 1-30 mcg/min, IntraVENous, TITRATE, Hari Ramirez MD, Last Rate: 90 mL/hr at 06/07/21 0343, 30 mcg/min at 06/07/21 0343    NOREPINephrine (LEVOPHED) 8 mg in 5% dextrose 250mL (32 mcg/mL) infusion, 0.5-30 mcg/min, IntraVENous, TITRATE, Hari Ramirez MD, Last Rate: 37.5 mL/hr at 06/07/21 0105, 20 mcg/min at 06/07/21 0105    chlorhexidine (ORAL CARE KIT) 0.12 % mouthwash 15 mL, 15 mL, Oral, Q12H, Alex Chavez, NP-C    insulin lispro (HUMALOG) injection, , SubCUTAneous, Q6H, Alex Chavez, NP-C    vasopressin (VASOSTRICT) 20 Units in 0.9% sodium chloride 100 mL infusion, 0-0.04 Units/min, IntraVENous, TITRATE, Hari Ramirez MD, Last Rate: 12 mL/hr at 06/07/21 0152, 0.04 Units/min at 06/07/21 0152    sodium bicarbonate 150 mEq/1000 mL D5W (premix), , IntraVENous, CONTINUOUS, Hari Ramirez MD, Last Rate: 100 mL/hr at 06/07/21 0333, New Bag at 06/07/21 0333    sodium bicarbonate (8.4%) injection 100 mEq, 100 mEq, IntraVENous, NOW, Alex Woods NP-EDER    0.9% sodium chloride infusion 250 mL, 250 mL, IntraVENous, PRN, Moni ASHLEY NP-C    lactated ringers bolus infusion 1,000 mL, 1,000 mL, IntraVENous, ONCE, ALEXANDRIA Cohen, Last Rate: 2,000 mL/hr at 06/07/21 0421, 1,000 mL at 06/07/21 0421    0.9% sodium chloride infusion 250 mL, 250 mL, IntraVENous, PRN, MARIAJOSE CohenC    pantoprazole (PROTONIX) 40 mg in 0.9% sodium chloride 10 mL injection, 40 mg, IntraVENous, Q12H, Alex Chavez NP-EDER    0.9% sodium chloride infusion 250 mL, 250 mL, IntraVENous, PRN, Moni ASHLEY NP-C    sodium bicarbonate 8.4 % (1 mEq/mL) injection 50 mEq, 50 mEq, IntraVENous, NOW, Karon Ryan MD    0.9% sodium chloride infusion 250 mL, 250 mL, IntraVENous, PRN, Moni ASHLEY NP-C    0.9% sodium chloride infusion 250 mL, 250 mL, IntraVENous, PRN, Karon Ryan MD    insulin glargine (LANTUS) injection 10 Units, 10 Units, SubCUTAneous, QHS, Carmen Hardin MD, 10 Units at 06/06/21 2326    levoFLOXacin (LEVAQUIN) 750 mg in D5W IVPB, 750 mg, IntraVENous, Q24H, Carmen Hardin MD, Last Rate: 100 mL/hr at 06/06/21 1813, 750 mg at 06/06/21 1813    [Held by provider] furosemide (LASIX) injection 40 mg, 40 mg, IntraVENous, BID, Carmen Hardin MD, 40 mg at 06/06/21 1740    methylPREDNISolone (PF) (SOLU-MEDROL) injection 40 mg, 40 mg, IntraVENous, Q12H, Larry Ribeiro PA-C, 40 mg at 06/06/21 2114    LORazepam (ATIVAN) injection 1 mg, 1 mg, IntraVENous, Q4H PRN, Baron Ruiz NP, 1 mg at 06/06/21 1905    glucose chewable tablet 16 g, 4 Tablet, Oral, PRN, Ahmet Sal MD    dextrose (D50W) injection syrg 12.5-25 g, 12.5-25 g, IntraVENous, PRN, Ahmet Sal MD    glucagon [facility]) injection 1 mg, 1 mg, IntraMUSCular, PRN, Ahmet Sal MD    [Held by provider] amLODIPine (NORVASC) tablet 5 mg, 5 mg, Oral, DAILY, Samreen Dewitt MD, 5 mg at 06/06/21 1017    guaiFENesin (ROBITUSSIN) 100 mg/5 mL oral liquid 100 mg, 100 mg, Oral, Q4H PRN, Samreen Dewitt MD    albuterol (PROVENTIL HFA, VENTOLIN HFA, PROAIR HFA) inhaler 2 Puff, 2 Puff, Inhalation, TID RT, Samreen Dewitt MD, 2 Puff at 06/06/21 1942    [Held by provider] enoxaparin (LOVENOX) injection 100 mg, 100 mg, SubCUTAneous, Q12H, Larry Ribeiro PA-C, 100 mg at 06/06/21 1205    [Held by provider] fluticasone propionate (FLONASE) 50 mcg/actuation nasal spray 2 Spray, 2 Spray, Both Nostrils, QHS, Larry Ribeiro PA-C, 2 [place] at 06/06/21 2200    sodium chloride (OCEAN) 0.65 % nasal squeeze bottle 2 Spray, 2 Spray, Both Nostrils, Q2H PRN, Larry Booker PA-C    sodium chloride (NS) flush 5-40 mL, 5-40 mL, IntraVENous, Q8H, NICHOLAS Davis MD, 10 mL at 06/06/21 2116    sodium chloride (NS) flush 5-40 mL, 5-40 mL, IntraVENous, PRN, Susan JQGABIGPHILIPP SPRAGUE MD    sodium chloride (NS) flush 5-40 mL, 5-40 mL, IntraVENous, Q8H, YASMANI Davis MD, 10 mL at 06/06/21 2116    sodium chloride (NS) flush 5-40 mL, 5-40 mL, IntraVENous, PRN, AYDIN Davis MD, 10 mL at 05/23/21 1236    acetaminophen (TYLENOL) tablet 650 mg, 650 mg, Oral, Q6H PRN, 650 mg at 06/06/21 0640 **OR** acetaminophen (TYLENOL) suppository 650 mg, 650 mg, Rectal, Q6H PRN, Susan YSPHIL SPRAGUE MD    polyethylene glycol (MIRALAX) packet 17 g, 17 g, Oral, DAILY PRN, CHANG Davis MD    promethazine (PHENERGAN) tablet 12.5 mg, 12.5 mg, Oral, Q6H PRN **OR** ondansetron (ZOFRAN) injection 4 mg, 4 mg, IntraVENous, Q6H PRN, TAYLOR Davis MD, 4 mg at 05/27/21 1325    acyclovir (ZOVIRAX) tablet 400 mg, 400 mg, Oral, BID, Susan FZKDLILLIE SPRAGUE MD, 400 mg at 06/06/21 1740    [Held by provider] ferrous sulfate tablet 325 mg, 1 Tablet, Oral, TID WITH MEALS, HARVEY Davis MD, 325 mg at 06/06/21 1740    [Held by provider] FLUoxetine (PROzac) capsule 40 mg, 40 mg, Oral, DAILY, Brook Davis MD, 40 mg at 06/06/21 1017    [Held by provider] folic acid (FOLVITE) tablet 1 mg, 1 mg, Oral, DAILY, ROSEANNA Davis MD, 1 mg at 06/06/21 1017    [Held by provider] tamsulosin (FLOMAX) capsule 0.4 mg, 0.4 mg, Oral, DAILY, LENNY Davis MD, 0.4 mg at 06/06/21 1017      Chest X-Ray:  CXR Results  (Last 48 hours)               06/07/21 0255  XR CHEST PORT Final result    Impression:  1. Appropriate endotracheal tube, nasogastric tube, and central line placement. 2. Bilateral airspace opacification. 3. Question pneumomediastinum. Narrative:  EXAM: XR CHEST PORT       INDICATION: Intubation       COMPARISON: [facility] 3       FINDINGS: A portable AP radiograph of the chest was obtained at 0248 hours. The   endotracheal tube is at the thoracic inlet. A right jugular line tip overlies   the superior vena cava. The nasogastric tube tip is in the region of the   stomach. The patient is on a cardiac monitor. There is patchy opacification in   the lungs bilaterally. There may be a pneumomediastinum. The cardiac and   mediastinal contours and pulmonary vascularity are normal.  The bones and soft   tissues are grossly within normal limits. CT Results  (Last 48 hours)    None        ECHO: Pending    Multidisciplinary Rounds Completed:  No    ABCDEF Bundle/Checklist Completed:  Yes    SPECIAL EQUIPMENT  mechanical ventilator    DISPOSITION  Stay in ICU    CRITICAL CARE CONSULTANT NOTE  I had a face to face encounter with the patient, reviewed and interpreted patient data including clinical events, labs, images, vital signs, I/O's, and examined patient.   I have discussed the case and the plan and management of the patient's care with the consulting services, the bedside nurses and the respiratory therapist.      NOTE OF PERSONAL INVOLVEMENT IN CARE   This patient has a high probability of imminent, clinically significant deterioration, which requires the highest level of preparedness to intervene urgently. I participated in the decision-making and personally managed or directed the management of the following life and organ supporting interventions that required my frequent assessment to treat or prevent imminent deterioration. I personally spent 140 minutes of critical care time. This is time spent at this critically ill patient's bedside actively involved in patient care as well as the coordination of care and discussions with the patient's family. This does not include any procedural time which has been billed separately.     Ronnie Santana AGAP-BC     [address] [facility]

## 2021-06-07 NOTE — PROGRESS NOTES
Spiritual Care Assessment/Progress Note [address] 
 
 
NAME: Jose Hernandez      MRN: [id number] AGE: 78 y.o. SEX: male Holiness Affiliation: No preference Language: [place] 6/7/2021     Total Time (in minutes): 20 Spiritual Assessment begun in [facility] 4 IMCU through conversation with: 
  
    []Patient        [] Family    [] Friend(s) Reason for Consult: Code Blue/99 Spiritual beliefs: (Please include comment if needed) 
   [] Identifies with a reynaldo tradition:     
   [] Supported by a reynaldo community:        
   [] Claims no spiritual orientation:       
   [] Seeking spiritual identity:            
   [] Adheres to an individual form of spirituality:       
   [x] Not able to assess:                   
 
    
Identified resources for coping:  
   [] Prayer                           
   [] Music                  [] Guided Imagery 
   [] Family/friends                 [] Pet visits [] Devotional reading                         [x] Unknown 
   [] Other:                                       
 
 
Interventions offered during this visit: (See comments for more details) Plan of Care: 
 
 [] Support spiritual and/or cultural needs  
 [] Support AMD and/or advance care planning process    
 [] Support grieving process 
 [] Coordinate Rites and/or Rituals  
 [] Coordination with community clergy [] No spiritual needs identified at this time 
 [] Detailed Plan of Care below (See Comments)  [] Make referral to Music Therapy 
[] Make referral to Pet Therapy    
[] Make referral to Addiction services 
[] Make referral to [facility] 
[] Make referral to Spiritual Care Partner 
[] No future visits requested       
[x] Follow up upon further referrals Comments:  responded to Code Blue room 417. Patient is under Covid-19 isolation precautions. Staff with patient providing care.   Nurse practitioner contacted the patient's wife informing her of her 's condition. Wife stated her granddaughter would be bringing her to the hospital.   
 
Rev. Yudelka Balderas, MDiv, [facility], [address]  paging service: [phone])

## 2021-06-07 NOTE — DEATH NOTE
Pt Name  Yves Moeller   Admit date:  5/22/2021   Date and time of death:  06/07/21 @ [id number]   Room Number  7104/01    Medical Record Number  [id number] @ [address]   Age  78 y.o. Date of Birth 1945   PCP Betsy Esparza MD   Attending physician Steve Aldridge MD      Code Status  Full Code    Patient seen and examined     Mental status   Unresponsive    Pupils Dilated and Fixed   Respiration Nil    Pulse  Absent     Heart Sounds  Absent    Rhythm  Flat line, Asystole   Family  Notified by Nursing staff    Chaplan Service  Notified by Nursing staff     Death certificate and discharge summary completion remain  Dr. Steve Aldridge MD's responsibility.                                  6/7/2021     Hailey Santana [facility]     Critical Care Medicine  [facility]

## 2021-06-07 NOTE — PROGRESS NOTES
Bedside report on pt received by April RN. Upon initial assessment, pt on BIPAP. Appears anxious but subdued and cooperative. R arm cool, swollen, red, and sensitive to touch, with cap refill >3 seconds. 2100: missed call from NP with orders to take pt down to CT asap. 2120: Trialed pt on nonrebreather for 7 minutes per charge RN. Pt in no acute distress with O2 sats remaining stable in the upper 90s. 2150 - 2250: pt down to CT on nonrebreather with 25L. Pt became anxious, agitated, and uncooperative. O2 sats fell to 50s. Rapid response called. Pt put on BIPAP and given 1 mg ativan. Difficulty assessing O2 due to poor pleth readings on fingers, toes, and ears. Pt continues to be restless and uncooperative. CT unable to be obtained due to pt movement. 2300: Pt returned to [facility]. Resting quietly. 0015: Dr. Jaye Vences states she just rounded on pt, and that he appears calm. Verbal orders received for another attempt at CT, and an ABG. RT notified. 0025: Walked into the room. RT in room, states she's unable to palpate pulse needed for ABG draw. Pt pale and unresponsive to voice, pain, or sternal rub. No pulses detected by RN or RT. 
 
0026: Code Blue called, compressions started. Code team arrived. 0200:  
TRANSFER - OUT REPORT: 
 
Verbal report given to Mary Jo Colon RN (name) on Kandis Ends  being transferred to ED (unit) for change in patient condition(post code blue) Report consisted of patients Situation, Background, Assessment and  
Recommendations(SBAR). Information from the following report(s) SBAR, Kardex, ED Summary, Procedure Summary, Intake/Output, MAR, Accordion, Recent Results and Cardiac Rhythm Returned was reviewed with the receiving nurse. Lines: PICC Triple Lumen 09/71/74 Right;Basilic (Active) Central Line Being Utilized No 06/06/21 1640 Criteria for Appropriate Use Hemodynamically unstable, requiring monitoring lines, vasopressors, or volume resuscitation 06/06/21 1640  
Site Assessment Painful 06/06/21 1640 Phlebitis Assessment 2 06/06/21 1640 Infiltration Assessment 2 06/06/21 1640 Arm Circumference (cm) 31.5 cm 06/03/21 1440 Date of Last Dressing Change 06/03/21 06/06/21 1640 Dressing Status Intact 06/06/21 1640 External Catheter Length (cm) 1 centimeters 06/03/21 1440 Dressing Type Disk with Chlorhexadine gluconate (CHG); Transparent 06/06/21 1640 Action Taken Open ports on tubing capped 06/06/21 1640 Hub Color/Line Status Blue;Capped 06/06/21 1446 Positive Blood Return (Site #1) Yes 06/06/21 1446 Hub Color/Line Status Pink;Capped 06/06/21 1446 Positive Blood Return (Site #2) Yes 06/06/21 1446 Hub Color/Line Status White;Capped 06/06/21 1446 Positive Blood Return (Site #3) Yes 06/06/21 1446 Alcohol Cap Used Yes 06/06/21 1446 Quad Lumen 06/07/21 Anterior;Right Internal jugular (Active) Peripheral IV 06/02/21 Anterior;Left;Proximal Antecubital (Active) Site Assessment Clean, dry, & intact 06/06/21 1640 Phlebitis Assessment 0 06/06/21 1640 Infiltration Assessment 0 06/06/21 1640 Dressing Status Clean, dry, & intact 06/06/21 1640 Dressing Type Transparent;Tape 06/06/21 1640 Hub Color/Line Status Pink; Infusing 06/06/21 1640 Action Taken Open ports on tubing capped 06/06/21 1640 Alcohol Cap Used Yes 06/06/21 1640 Opportunity for questions and clarification was provided.    
 
Patient transported by code team.

## 2021-06-07 NOTE — PROCEDURES
[facility]      Procedure Note - Central Venous Access:   Performed by ALEXANDRIA Haskins  Diagnosis: Cardiac arrest  Insertion Date: 06/07/21 Time:2:30 AM  Obtained Consent? no; emergent   Procedure Location:  ICU. Immediately prior to the procedure, the patient was reevaluated and found suitable for the planned procedure and any planned medications. Immediately prior to the procedure a time out was called to verify the correct patient, procedure, equipment, staff, and marking as appropriate. Central line Bundle:  Full sterile barrier precautions used. 7-Step Sterility Protocol followed. (cap, mask sterile gown, sterile gloves, large sterile sheet, hand hygiene, 2% chlorhexidine for cutaneous antisepsis)    Patient positioned in Trendelenburg?yes   The site was prepped with ChloraPrep and Sterile draping. Catheter inserted into a new site. Using Seldinger technique a Triple Lumen CVC was placed in the Right, Internal Jugular Vein via direct cannulation with x1 number of attempts for Monitoring, Blood Drawing and IV Access. Ultrasound Guidance was utilized. There was good dark, non-pulsatile blood return in all ports. Femoral Site? no. If Yes, reason femoral site was chosen: NA  Catheter secured. Biopatch in place? yes. Sterile Bio-occlusive dressing placed. The following complications were encountered: None. A follow-up chest x-ray was ordered post procedure. Completed. Line in place may use. The procedure was tolerated well.        ALEXANDRIA Haskins  Critical Care Medicine  [facility] [place] [facility]

## 2021-06-07 NOTE — PROCEDURES
[facility]      Procedure Note - Arterial Access:   Performed by Lavelle Martinez MD.  Diagnosis: VF arrest  Insertion Date: 6/6/21 Time:23:30   Obtained Consent? no; emergent   Procedure Location:  ICU Code Blue. Immediately prior to the procedure, the patient was reevaluated and found suitable for the planned procedure and any planned medications. Immediately prior to the procedure a time out was called to verify the correct patient, procedure, equipment, staff, and marking as appropriate. Central line Bundle:  Full sterile barrier precautions used. 5 mL 1% Lidocaine placed at insertion site. Method: Seldinger technique. Site Prep: ChloraPrep. Procedure: Arterial Catheter Insertion in Left, Radial Artery   Catheter inserted into a new site. Number of Attempts:  1 Indication: Monitoring and Blood Drawing. There was bright red, pulsatile blood return. Femoral Site? no.   Catheter secured. Biopatch in place? yes. Sterile Bio-occlusive dressing placed. Complication None. The procedure was tolerated well.     Lavelle Martinez MD   Critical Care Medicine  [facility]

## 2021-06-07 NOTE — PROGRESS NOTES
[facility]  Hospitalist Group     ICU Transfer/Accept Summary     This patient is being transferred INTO THE ICU  DATE OF TRANSFER: 6/7/2021       PATIENT ID: Tom Canada  MRN: [id number]   YOB: 1945    PRIMARY CARE PROVIDER: Suzanna Hoyos MD   DATE OF ADMISSION: 5/22/2021  1:40 PM    ATTENDING PHYSICIAN: Yuli Anderson MD  CONSULTATIONS:   IP CONSULT TO ONCOLOGY  IP CONSULT TO INTENSIVIST  IP CONSULT TO PULMONOLOGY    PROCEDURES/SURGERIES:   * No surgery found *    REASON FOR ADMISSION: <principal problem not specified>     HOSPITAL PROBLEM LIST:  Patient Active Problem List   Diagnosis Code    Acute hypoxemic respiratory failure ([facility] [address] J96.01    Pneumonia due to COVID-19 virus U07.1, J12.82         Brief HPI and Hospital Course:      Cardiopulmonary Arrest jo Santos is a 79 y/o M with a PMH significant for hypertension and multiple myeloma who was admitted 5/22/2021 with acute hypoxic respiratory failure due to COVID-19 pneumonia and severe sepsis. He has had intermittent rectal bleeding thought to be side effect of his chemotherapy. He has been on high flow oxygen and required BiPAP for much of his hospitalization. His proBNP has remained elevated. He is being treated for gram-negative bacteremia his rectal bleeding seems to have improved. Heme-onc has recommended holding Pomalyst while patient is acutely ill and being treated for COVID-19 pneumonia. He has been on enoxaparin 100 mg twice daily since shortly after admission for suspected VTE. Today, patient's right arm was noted to be swollen, tight and ecchymotic. His right hand was cooler than the left, but he had a radial pulse. Ultrasound was negative for deep thrombus, but portions of the brachial veins, the basilic vein in the upper arm cephalic and forearm cephalic veins were not visualized. .  There is suspicion that patient was bleeding from his upper arm PICC line and a CT was ordered.   Patient became hypoxic and agitated in CT and a rapid response was called. After thorough evaluation of the patient, he was placed back on BiPAP from the NRB mask he had been transported down with and 1 mg of Ativan was given. Patient seemed to calm down and his oxygen saturation improved, but he remained restless and the CT could not be completed. After I discussed the case with the nocturnist physician, we determined that another attempt at imaging should be made given the presentation of the patient's RUE. Only several minutes after returning to the patient room however, his primary nurse found him unresponsive and stated she was unable to find a pulse. A code was called and CPR was initiated. The initial cardiac rhythm appeared to be ventricular fibrillation and shock was delivered. After several rounds of ACLS that included high-quality CPR and additional defibrillation attempts, ROSC was achieved. Patient was intubated by anesthesia and he was transferred to the ICU. Assessment and Plan:    · Cardiopulmonary arrest likely d/t acute hypoxic respiratory failure due to COVID-19 pneumonia  · Salmonella bacteremia w sepsis  · Multiple myeloma      Discussed course of events with patient's wife Prakash Norman and daughter both via phone at [phone] an in person in the ICU waiting area. PHYSICAL EXAMINATION:  Visit Vitals  /80 (BP 1 Location: Left arm, BP Patient Position: At rest)   Pulse (!) 112   Temp 97.3 °F (36.3 °C)   Resp 25   Ht 5' 11\" (1.803 m)   Wt 97.6 kg (215 lb 2.7 oz)   SpO2 100%   BMI 30.01 kg/m²       General:          unresponsive  HEENT:           Atraumatic, MMM            Neck:               Supple, symmetrical,    Lungs:            coarse throughout, no wheezing. BVM assist.  Heart:              Regular  rhythm,  No  murmur   No edema  Abdomen:       Soft, Not distended. Extremities:      Right upper arm ecchymosis and swollen.   Prior to code, right hand has been cooler than left, but radial pulse  Skin:                 Pale, ashen, dry  Psych:              Unable to evaluate  Neurologic:       Unresponsive, intubated    CODE STATUS:  x Full Code    DNR    Partial    Comfort Care       IMPENDING DETERIORATION -Airway, Respiratory, Cardiovascular, CNS and Metabolic    ASSOCIATED RISK FACTORS - Hypotension, Vascular Compromise and CNS Decompensation    MANAGEMENT- Bedside Assessment, Supervision of Care and Transfer    INTERPRETATION -  ECG, Blood Pressure and Cardiac Output Measures     INTERVENTIONS - hemodynamic mngmt, vent mngmt, defibrillation, Neurologic interventions  and Metobolic interventions    CASE REVIEW - Hospitalist/Intensivist, Medical Sub-Specialist, Nursing and Family    TREATMENT RESPONSE -Stable    PERFORMED BY - Self, Physician and intensivist, RRT      This patient is unstable and critically ill. Due to a high probability of clinically significant, life threatening deterioration, the patient required my highest level of preparedness to intervene emergently and I personally spent   50 minutes of critical care time directly and personally managing the patient, and was immediately available to the patient. This critical care time included obtaining a history; examining the patient; pulse oximetry; ordering and review of labs/studies; arranging urgent treatment with development of a management plan; evaluation of patient's response to treatment; frequent reassessment; and, discussions with other providers and/or family. This critical care time was performed to assess and manage the high probability of imminent, life-threatening deterioration that could result in multi-organ failure and death.        Debbi Pardo, MPH, MSN, RN, NP-C  [phone] or via Perfect Serve       Signed:   Debbi Pardo NP  Date of Service:  6/7/2021  12:33 AM

## 2021-06-07 NOTE — PROGRESS NOTES
RT was called to do ABG prior to transporting patient to CT,patient unresponsive,sternal rub was performed but no reaction noted. pulse was weak then patient became pulseless. Code blue called at 0026. RT at bedside until transferred to ICU.

## 2021-06-07 NOTE — PROGRESS NOTES
TRANSFER - IN REPORT: 
 
Verbal report received from [address] Rigo Buckner RN(name) on Diane Chavez  being received from [facility]) for change in patient condition(Post Code Blue) Report consisted of patients Situation, Background, Assessment and  
Recommendations(SBAR). Information from the following report(s) SBAR, Kardex, Recent Results and Cardiac Rhythm Sinus Annika Six was reviewed with the receiving nurse. Opportunity for questions and clarification was provided. [id number]: Assumed care of Patient post Code Blue from [facility]. Patient arrived on multiple pressors, unstable BP, and bag ventilated by RT.  RIJ central line placed by [facility] AT [facility]Y [facility]. Multiple IV boluses given, Multiple blood products given, Pressors titrated (See MAR, Blood administration, and Code documentation flowsheets). At Patricia Mcdonald MD performing broncho-blocker after perfuse blood from ET tube post code. 0789:  Patient time of death. Kennedy Perez MD at bedside pronouncing TOD. Post mortem care performed and lines removed. 80: Family at bedside, autopsy requested.

## 2021-06-07 NOTE — PROCEDURES
[facility]      Procedure Note - Central Venous Access:   Performed by Terence Green MD  Diagnosis: VF arrest  Insertion Date: 6/6/21 Time:23:45   Obtained Consent? no; emergent   Procedure Location:  ICU. Immediately prior to the procedure, the patient was reevaluated and found suitable for the planned procedure and any planned medications. Immediately prior to the procedure a time out was called to verify the correct patient, procedure, equipment, staff, and marking as appropriate. Central line Bundle:  Full sterile barrier precautions used. 7-Step Sterility Protocol followed. (cap, mask sterile gown, sterile gloves, large sterile sheet, hand hygiene, 2% chlorhexidine for cutaneous antisepsis)  5 mL 1% Lidocaine placed at insertion site. Patient positioned in Trendelenburg?yes   The site was prepped with ChloraPrep. Catheter inserted into a new site. Procedure was aborted due to patient movement and hemodynamic instability requiring immediate transfer to ICU.        Terence Green MD  Critical Care Medicine  [facility]

## 2021-06-07 NOTE — PROGRESS NOTES
provided spiritual presence, listening, and support as patient's family awaited opportunity to see the patient. Patient is under Covid-19 isolation precautions. Family escorted to the patient's room where they were able to view their loved one as the staff provided care, answered questions, and explained the status and care of the patient. Family was escorted to the ICU waiting area as the staff continued to provide care. Patient experienced another cardiac arrest (Code Blue), the fourth he experienced overall, resulting in his death. The doctor and staff spoke to the family informing them of the patient's death and provided comfort. Escorted family to the patient's bedside where they were able to be with him and one another. The family requested an autopsy to be performed and signed the consent. They also collected the patient's personal items and took them home when they left. Escorted family to hospital exit where they appeared comforted as a result of all of the care provided and expressed gratitude for this visit. Rev.  Dayday Sheppard, [place], [facility], [facility]   paging service: [phone])

## 2021-06-07 NOTE — DISCHARGE SUMMARY
[facility]                                                                                         Discharge Summary     Patient: Abdirizak Dietz       MRN: [id number]       YOB: 1945       Age: 78 y.o. Date of admission:  2021    Date of discharge:  2021    Primary care provider:  Nasim Le MD     Admitting provider:  Rufino Andujar MD    Discharging provider(s): Sobia Duff NP-C - Staff [facility]     Consultations  · IP CONSULT TO ONCOLOGY  · IP CONSULT TO INTENSIVIST  · IP CONSULT TO PULMONOLOGY    Procedures  · 21 - Intubation   · 21 - CVL placement  · 21 - Arterial line placement  · 21 - Bronchoscopy  · 6/3/21 - PICC placement    Discharge destination: [place]. . Admission diagnosis  · Acute hypoxemic respiratory failure (HCC) [J96.01]  · Pneumonia due to COVID-19 virus [U07.1, J12.82]    Please refer to the admission history and physical for details on the presenting problem. Final discharge diagnoses and brief hospital course    PEA cardiac arrest   Acute hypoxic respiratory failure due to Covid19 pneumonia  Severe sepsis secondary to bilateral pneumonia and bactermia  Salmonella bacteremia   Hypovolemic shock   Right lung pulmonary hemorrhage  GI bleed  Severe Metabolic Acidosis   Chronic intermittent rectal bleeding related to his cancer and chemotherapy.    Severe Leukocytosis  ZHANE   Hypocalcemia  Shock liver  Lactic acidosis   History of multiple myeloma  Hx Hypertension    Abdirizak Dietz is a 78 y.o. gentleman who was undergoing treatment for multiple myeloma was brought to the ED  for rectal bleed and shortness of breath. Upon arrival of EMS at his home, patient was found to be in respiratory distress and hypoxic with SPO2 in the 70s and he was put on NRB and brought to the ED on 2021. Admitted for rectal bleed and Covid pneumonia. On IMCU, Pulmonary and Hem/onc consulted and following. Worsening hypoxia. On 6/7/21 patient became restless and agitated. Right arm PICC was not working and arm was swollen, tight, and discolored. CT Patient was given 1 mg of Ativan for CT but was not able to lay flat for scan. RRT was called in CT. Another 1 mg of Ativan was given to calm patient and patient was brought back up to [facility]. Hospitalist came to re-evaluate patient and when they went back into the room patient was unresponsive in PEA arrest. Code blue was called. After ROSC (23 mins), patient was started on epinephrine gtt and levophed gtt. Arterial line was placed at bedside, CVL placement attempted at Left IJ but unable to place. Patient was transferred to ICU. Started on vasopressin and bicarb gtt. Right CVL was successfully placed emergently after arrival to ICU. was Chest x-ray confirmed placement of CVL , ETT and OGT. Family was then updated outside ICU in waiting room. Family requested patient remain full code at this time. Blood consent received. Patient re-coded in ICU. ROSC achieved (4mins). Intensivist at bedside POC TTE. Concern for Hypovolemia. Acute Hgb drop. Massive transfusion protocol initiated. 1:1:1 transfusion. Blood bank notified. Patient coded again, resuscitated (ROSC achieved in 3 mins). Given multiple blood products. Hemorrhage from ETT. Diagnostic bronch was done with Uniblocker to stent bleed from right lung with success. Hemodynamics slightly improved for a short period of time. However patient coded again. PEA arrest. Time of death was then called by Dr. Chelsey Hawk at bedside on 6/ 7/21 at [id number]. Patient's wife and daughter, was notified of patient's passing. Pastoral care present with family.     Recent Labs     06/07/21  0135 06/06/21  0052   WBC 69.3* 39.2*   HGB 7.4* 12.9   HCT 25.6* 39.8    195     Recent Labs     06/07/21  0135 06/06/21  0052    136   K 4.8 4.6    102   CO2 8* 29   BUN 70* 54*   CREA 2.22* 1.18   * 204*   CA 6.8* 7.7*   MG 3.0* 2.2   PHOS 12.7*  --      Recent Labs     06/07/21  0135 06/06/21  0052   AP 61 73   TP 3.1* 5.4*   ALB 1.4* 2.6*   GLOB 1.7* 2.8     Recent Labs     06/07/21  0135   INR 1.8*   PTP 17.9*   APTT 76.3*      No results for input(s): FE, TIBC, PSAT, FERR in the last 72 hours. No results for input(s): PH, PCO2, PO2 in the last 72 hours. No results for input(s): CPK, CKMB in the last 72 hours. No lab exists for component: TROPONINI  No components found for: [place]    Pertinent imaging studies:  CT Results  (Last 48 hours)    None        CXR Results  (Last 48 hours)               06/07/21 0455  XR CHEST PORT Final result    Impression:  Persistent diffuse bilateral airspace opacification. Narrative:  EXAM: XR CHEST PORT       INDICATION: post bronch       COMPARISON: Earlier in the day       FINDINGS: A portable AP radiograph of the chest was obtained at 0448 hours. The   endotracheal tube and central line are stable. The nasogastric tube continues   beyond the film. There is a tube which extends into the right mainstem bronchus. The patient is on a cardiac monitor. There is diffuse bilateral airspace   opacification. The cardiac and mediastinal contours and pulmonary vascularity   are normal.  The bones and soft tissues are grossly within normal limits. 06/07/21 0255  XR CHEST PORT Final result    Impression:  1. Appropriate endotracheal tube, nasogastric tube, and central line placement. 2. Bilateral airspace opacification. 3. Question pneumomediastinum. Narrative:  EXAM: XR CHEST PORT       INDICATION: Intubation       COMPARISON: T[facility]ha 3       FINDINGS: A portable AP radiograph of the chest was obtained at 0248 hours. The   endotracheal tube is at the thoracic inlet. A right jugular line tip overlies   the superior vena cava. The nasogastric tube tip is in the region of the   stomach. The patient is on a cardiac monitor. There is patchy opacification in   the lungs bilaterally.  There may be a pneumomediastinum.  The cardiac and   mediastinal contours and pulmonary vascularity are normal.  The bones and soft   tissues are grossly within normal limits.                ---------------------------------    Chronic Diagnoses:    Problem List as of 6/7/2021 Never Reviewed        Codes Class Noted - Resolved    [facility] ICD-10-CM: J96.01  ICD-9-CM: 518.81  5/22/2021 - Present        Pneumonia due to COVID-19 virus ICD-10-CM: U07.1, J12.82  ICD-9-CM: 480.8, 079.89  5/22/2021 - Present              Signed:      ALEXANDRIA Singh   Staff [facility]    6/7/2021   4:57 AM              Terence Green MD   Attending        Cc: Mike Lynn MD

## 2021-06-07 NOTE — PROGRESS NOTES
Spiritual Care Assessment/Progress Note [address] 
 
 
NAME: Vandana Mott      MRN: [id number] AGE: 78 y.o. SEX: male Evangelical Affiliation: No preference Language: Tricia Gomez 6/7/2021     Total Time (in minutes): 93  
 
Spiritual Assessment begun in [address] through conversation with: 
  
    []Patient        [x] Family    [] Friend(s) Reason for Consult: Death, Inpatient Spiritual beliefs: (Please include comment if needed) [x] Identifies with a reynaldo tradition:  Chantel Angulo   
   [] Supported by a reynaldo community:        
   [] Claims no spiritual orientation:       
   [] Seeking spiritual identity:            
   [] Adheres to an individual form of spirituality:       
   [] Not able to assess:                   
 
    
Identified resources for coping:  
   [x] Prayer                           
   [] Music                  [] Guided Imagery [x] Family/friends                 [] Pet visits [] Devotional reading                         [] Unknown 
   [] Other:                                          
 
 
Interventions offered during this visit: (See comments for more details) Family/Friend(s): Affirmation of emotions/emotional suffering, Affirmation of reynaldo, Catharsis/review of pertinent events in supportive environment, Coping skills reviewed/reinforced, End of life issues discussed, Iconic (affirming the presence of God/Higher Power), Prayer (actual) Plan of Care: 
 
 [] Support spiritual and/or cultural needs  
 [] Support AMD and/or advance care planning process    
 [] Support grieving process 
 [] Coordinate Rites and/or Rituals  
 [] Coordination with community clergy  [] No spiritual needs identified at this time 
 [] Detailed Plan of Care below (See Comments)  [] Make referral to Music Therapy 
[] Make referral to Pet Therapy    
[] Make referral to Addiction services 
[] Make referral to [facility] 
[] Make referral to Spiritual Care Partner 
[] No future visits requested       
[x] Follow up upon further referrals Comments:  met family [place], Nicky Stanton (Popeye Billy) and daughter, Joe Gonsalves as they arrived to the unit. Informed staff of their arrival and family spoke with Nurse Practitioner. Nurse Practitioner informed family of patient transfer to ICU room 5014. Escorted family to ICU waiting area and provided spiritual presence, listening, and support as they spoke of their current thoughts, feelings, and concerns. Nicky Stanton spoke of how the family had contacted Covid-19 and how she and Mariza Desai had both been admitted to [facility]: Mariza Desai on 5/22/2021 and Nicky Stanton on 5/24/2021. Nicky Stanton was eventually discharged to home, while Mariza Desai remained hospitalized. Spoke of his health and health struggles in addition to the Covid virus. The patient had experienced two Code Blues while waiting. Family spoke to the provider and staff. Awaiting opportunity to see Mariza Desai as soon as able. Both appeared comforted and encouraged as a result of this visit and expressed gratitude for this visit. Rev. Maira Burton MDiv, [facility], [address]  paging service: [phone])

## 2021-06-07 NOTE — PROGRESS NOTES
TRANSFER - IN REPORT: 
 
Verbal report received from [address] Rigo Buckner RN(name) on Donn Smith  being received from [facility]) for {TRANSFER WMYQ:[id number]} Report consisted of patients Situation, Background, Assessment and  
Recommendations(SBAR). Information from the following report(s) {SBAR REPORTS VERT:[id number]} was reviewed with the receiving nurse. Opportunity for questions and clarification was provided. Assessment completed upon patients arrival to unit and care assumed.

## 2021-06-07 NOTE — PROCEDURES
[facility]  Bronchoscopy    Procedure: Therapeutic bronchoscopy. Indication: Hemoptysis    Consent/Treatment: Informed consent was obtained from the  Emergent after risks, benefits and alternatives were explained. Timeout verified the correct patient and correct procedure. Anesthesia:   Patient on ventilator and receiving  Patient hypotensive and unresponsive; all sedation held    Moderate ( conscious ) sedation was administered by the endoscopy nurse and supervised by the endoscopist. The following parameters were monitored: oxygen saturation, heart rate, blood pressure, respiratory rate, EKG, adequacy of pulmonary ventilation and response to care. Total physician intraservice time was 40 min    Procedure Details:   -- The bronchoscope was introduced through an endotracheal tube. -- The trachea and angela were completely inspected and were found to be normal.  -- The right main bronchus was noted to have jean bright red blood pooling to the BI. An endobronchial blocker was then placed to isolate the right pulmonary hemorrhage. Though there did not appear to be blood in the RUL, The bronchial blocker could not be reliably seated in the BI, so the site for balloon inflation was proximal to the RUL for complete left lung isolation. -- The left-sided endobronchial anatomy was inspected to the JOSEPH and noted to be clear of blood. Complications: significant hemophysis >100 ml    Estimated Blood Loss: procedure performed due to massive pulmonary hemorrhage.      Aiden Geller MD

## 2021-06-08 LAB
ABO + RH BLD: NORMAL
BLD PROD TYP BPU: NORMAL
BLOOD GROUP ANTIBODIES SERPL: NORMAL
BPU ID: NORMAL
CROSSMATCH RESULT,%XM: NORMAL
SPECIMEN EXP DATE BLD: NORMAL
STATUS OF UNIT,%ST: NORMAL
UNIT DIVISION, %UDIV: 0
UNIT DIVISION, %UDIV: NORMAL

## 2021-06-09 LAB
BACTERIA SPEC CULT: ABNORMAL
BACTERIA SPEC CULT: ABNORMAL
SERVICE CMNT-IMP: ABNORMAL

## 2021-06-11 LAB
BACTERIA SPEC CULT: NORMAL
BACTERIA SPEC CULT: NORMAL
SERVICE CMNT-IMP: NORMAL

## 2021-07-19 LAB
Lab: NORMAL
REFERENCE LAB,REFLB: NORMAL
TEST DESCRIPTION:,ATST: NORMAL